# Patient Record
Sex: FEMALE | Race: WHITE | NOT HISPANIC OR LATINO | Employment: OTHER | URBAN - METROPOLITAN AREA
[De-identification: names, ages, dates, MRNs, and addresses within clinical notes are randomized per-mention and may not be internally consistent; named-entity substitution may affect disease eponyms.]

---

## 2017-05-10 ENCOUNTER — ALLSCRIPTS OFFICE VISIT (OUTPATIENT)
Dept: OTHER | Facility: OTHER | Age: 62
End: 2017-05-10

## 2017-05-10 DIAGNOSIS — D50.9 IRON DEFICIENCY ANEMIA: ICD-10-CM

## 2017-05-10 DIAGNOSIS — E78.5 HYPERLIPIDEMIA: ICD-10-CM

## 2017-05-12 ENCOUNTER — APPOINTMENT (OUTPATIENT)
Dept: LAB | Facility: CLINIC | Age: 62
End: 2017-05-12
Payer: COMMERCIAL

## 2017-05-12 ENCOUNTER — TRANSCRIBE ORDERS (OUTPATIENT)
Dept: LAB | Facility: CLINIC | Age: 62
End: 2017-05-12

## 2017-05-12 DIAGNOSIS — E78.5 HYPERLIPIDEMIA: ICD-10-CM

## 2017-05-12 DIAGNOSIS — D50.9 IRON DEFICIENCY ANEMIA: ICD-10-CM

## 2017-05-12 LAB
ALBUMIN SERPL BCP-MCNC: 4.1 G/DL (ref 3.5–5)
ALP SERPL-CCNC: 79 U/L (ref 46–116)
ALT SERPL W P-5'-P-CCNC: 21 U/L (ref 12–78)
ANION GAP SERPL CALCULATED.3IONS-SCNC: 6 MMOL/L (ref 4–13)
AST SERPL W P-5'-P-CCNC: 17 U/L (ref 5–45)
BASOPHILS # BLD AUTO: 0.04 THOUSANDS/ΜL (ref 0–0.1)
BASOPHILS NFR BLD AUTO: 1 % (ref 0–1)
BILIRUB SERPL-MCNC: 0.7 MG/DL (ref 0.2–1)
BUN SERPL-MCNC: 13 MG/DL (ref 5–25)
CALCIUM SERPL-MCNC: 9.1 MG/DL (ref 8.3–10.1)
CHLORIDE SERPL-SCNC: 102 MMOL/L (ref 100–108)
CHOLEST SERPL-MCNC: 313 MG/DL (ref 50–200)
CO2 SERPL-SCNC: 30 MMOL/L (ref 21–32)
CREAT SERPL-MCNC: 0.94 MG/DL (ref 0.6–1.3)
EOSINOPHIL # BLD AUTO: 0.09 THOUSAND/ΜL (ref 0–0.61)
EOSINOPHIL NFR BLD AUTO: 2 % (ref 0–6)
ERYTHROCYTE [DISTWIDTH] IN BLOOD BY AUTOMATED COUNT: 13.2 % (ref 11.6–15.1)
GFR SERPL CREATININE-BSD FRML MDRD: >60 ML/MIN/1.73SQ M
GLUCOSE P FAST SERPL-MCNC: 89 MG/DL (ref 65–99)
HCT VFR BLD AUTO: 43.1 % (ref 34.8–46.1)
HDLC SERPL-MCNC: 72 MG/DL (ref 40–60)
HGB BLD-MCNC: 14.4 G/DL (ref 11.5–15.4)
LDLC SERPL CALC-MCNC: 216 MG/DL (ref 0–100)
LYMPHOCYTES # BLD AUTO: 2.26 THOUSANDS/ΜL (ref 0.6–4.47)
LYMPHOCYTES NFR BLD AUTO: 41 % (ref 14–44)
MCH RBC QN AUTO: 30 PG (ref 26.8–34.3)
MCHC RBC AUTO-ENTMCNC: 33.4 G/DL (ref 31.4–37.4)
MCV RBC AUTO: 90 FL (ref 82–98)
MONOCYTES # BLD AUTO: 0.42 THOUSAND/ΜL (ref 0.17–1.22)
MONOCYTES NFR BLD AUTO: 8 % (ref 4–12)
NEUTROPHILS # BLD AUTO: 2.69 THOUSANDS/ΜL (ref 1.85–7.62)
NEUTS SEG NFR BLD AUTO: 48 % (ref 43–75)
NRBC BLD AUTO-RTO: 0 /100 WBCS
PLATELET # BLD AUTO: 237 THOUSANDS/UL (ref 149–390)
PMV BLD AUTO: 10.4 FL (ref 8.9–12.7)
POTASSIUM SERPL-SCNC: 4.2 MMOL/L (ref 3.5–5.3)
PROT SERPL-MCNC: 7.7 G/DL (ref 6.4–8.2)
RBC # BLD AUTO: 4.8 MILLION/UL (ref 3.81–5.12)
SODIUM SERPL-SCNC: 138 MMOL/L (ref 136–145)
TRIGL SERPL-MCNC: 126 MG/DL
WBC # BLD AUTO: 5.52 THOUSAND/UL (ref 4.31–10.16)

## 2017-05-12 PROCEDURE — 85025 COMPLETE CBC W/AUTO DIFF WBC: CPT

## 2017-05-12 PROCEDURE — 36415 COLL VENOUS BLD VENIPUNCTURE: CPT

## 2017-05-12 PROCEDURE — 80061 LIPID PANEL: CPT

## 2017-05-12 PROCEDURE — 80053 COMPREHEN METABOLIC PANEL: CPT

## 2017-05-15 ENCOUNTER — GENERIC CONVERSION - ENCOUNTER (OUTPATIENT)
Dept: OTHER | Facility: OTHER | Age: 62
End: 2017-05-15

## 2017-06-07 ENCOUNTER — GENERIC CONVERSION - ENCOUNTER (OUTPATIENT)
Dept: OTHER | Facility: OTHER | Age: 62
End: 2017-06-07

## 2017-08-04 ENCOUNTER — ANESTHESIA EVENT (OUTPATIENT)
Dept: GASTROENTEROLOGY | Facility: MEDICAL CENTER | Age: 62
End: 2017-08-04
Payer: COMMERCIAL

## 2017-08-07 ENCOUNTER — GENERIC CONVERSION - ENCOUNTER (OUTPATIENT)
Dept: GASTROENTEROLOGY | Facility: MEDICAL CENTER | Age: 62
End: 2017-08-07

## 2017-08-07 ENCOUNTER — ANESTHESIA (OUTPATIENT)
Dept: GASTROENTEROLOGY | Facility: MEDICAL CENTER | Age: 62
End: 2017-08-07
Payer: COMMERCIAL

## 2017-08-07 ENCOUNTER — HOSPITAL ENCOUNTER (OUTPATIENT)
Facility: MEDICAL CENTER | Age: 62
Setting detail: OUTPATIENT SURGERY
Discharge: HOME | End: 2017-08-07
Attending: INTERNAL MEDICINE | Admitting: INTERNAL MEDICINE
Payer: COMMERCIAL

## 2017-08-07 VITALS
DIASTOLIC BLOOD PRESSURE: 51 MMHG | TEMPERATURE: 97.6 F | BODY MASS INDEX: 21.53 KG/M2 | OXYGEN SATURATION: 97 % | HEIGHT: 62 IN | SYSTOLIC BLOOD PRESSURE: 83 MMHG | HEART RATE: 48 BPM | WEIGHT: 117 LBS | RESPIRATION RATE: 16 BRPM

## 2017-08-07 RX ORDER — SODIUM CHLORIDE 9 MG/ML
125 INJECTION, SOLUTION INTRAVENOUS CONTINUOUS
Status: DISCONTINUED | OUTPATIENT
Start: 2017-08-07 | End: 2017-08-07 | Stop reason: HOSPADM

## 2017-08-07 RX ORDER — PROPOFOL 10 MG/ML
INJECTION, EMULSION INTRAVENOUS AS NEEDED
Status: DISCONTINUED | OUTPATIENT
Start: 2017-08-07 | End: 2017-08-07 | Stop reason: SURG

## 2017-08-07 RX ADMIN — PROPOFOL 50 MG: 10 INJECTION, EMULSION INTRAVENOUS at 11:59

## 2017-08-07 RX ADMIN — PROPOFOL 50 MG: 10 INJECTION, EMULSION INTRAVENOUS at 12:10

## 2017-08-07 RX ADMIN — PROPOFOL 50 MG: 10 INJECTION, EMULSION INTRAVENOUS at 12:05

## 2017-08-07 RX ADMIN — PROPOFOL 50 MG: 10 INJECTION, EMULSION INTRAVENOUS at 12:01

## 2017-08-07 RX ADMIN — SODIUM CHLORIDE 125 ML/HR: 0.9 INJECTION, SOLUTION INTRAVENOUS at 11:30

## 2017-08-07 RX ADMIN — PROPOFOL 150 MG: 10 INJECTION, EMULSION INTRAVENOUS at 11:54

## 2017-11-02 ENCOUNTER — TRANSCRIBE ORDERS (OUTPATIENT)
Dept: ADMINISTRATIVE | Facility: HOSPITAL | Age: 62
End: 2017-11-02

## 2017-11-02 DIAGNOSIS — Z12.39 SCREENING BREAST EXAMINATION: Primary | ICD-10-CM

## 2017-11-20 ENCOUNTER — GENERIC CONVERSION - ENCOUNTER (OUTPATIENT)
Dept: OTHER | Facility: OTHER | Age: 62
End: 2017-11-20

## 2017-12-07 DIAGNOSIS — Z12.31 ENCOUNTER FOR SCREENING MAMMOGRAM FOR MALIGNANT NEOPLASM OF BREAST: ICD-10-CM

## 2017-12-08 ENCOUNTER — ALLSCRIPTS OFFICE VISIT (OUTPATIENT)
Dept: OTHER | Facility: OTHER | Age: 62
End: 2017-12-08

## 2017-12-08 ENCOUNTER — GENERIC CONVERSION - ENCOUNTER (OUTPATIENT)
Dept: GYNECOLOGY | Facility: CLINIC | Age: 62
End: 2017-12-08

## 2017-12-08 ENCOUNTER — HOSPITAL ENCOUNTER (OUTPATIENT)
Dept: MAMMOGRAPHY | Facility: HOSPITAL | Age: 62
Discharge: HOME | End: 2017-12-08
Attending: OBSTETRICS & GYNECOLOGY
Payer: COMMERCIAL

## 2017-12-08 DIAGNOSIS — Z12.39 SCREENING BREAST EXAMINATION: ICD-10-CM

## 2017-12-08 PROCEDURE — G0202 SCR MAMMO BI INCL CAD: HCPCS

## 2017-12-09 NOTE — PROGRESS NOTES
Assessment    1  Encounter for routine gynecological examination (V72 31) (Z01 419)   2  Post-menopause atrophic vaginitis (627 3) (N95 2)    Plan  Encounter for screening mammogram for malignant neoplasm of breast    · * MAMMO SCREENING BILATERAL W CAD; Status:Hold For - Scheduling,RetrospectiveAuthorization; Requested for:[id number];    Perform:[facility]; [phone]; Last Updated By:Aminata Joseph; 12/8/2017 8:53:09 AM;Ordered;For:Encounter for screening mammogram for malignant neoplasm of breast; Ordered By:Victoriano Wheatley;   · MAMMO SCREENING BILATERAL W 3D & CAD; Status:Canceled - Scheduling;    Perform:[facility]; EHB:[id number]; Last Updated By:mAinata Joseph; 12/8/2017 8:52:37 AM;Ordered;for screening mammogram for malignant neoplasm of breast; Ordered By:Victoriano Wheatley;    Discussion/Summary  Pap test was done today Breast cancer screening: mammogram has been ordered  Colorectal cancer screening: colorectal cancer screening is current, colonoscopy is needed every five years and the next colonoscopy is due 2022  Osteoporosis screening: bone mineral density testing is current  Refill estrace cream       Chief Complaint  Patient presents for yearly exam       History of Present Illness  GYN HM, Adult Female [facility]: The patient is being seen for a gynecology evaluation  The last health maintenance visit was 1 year(s) ago  General Health:  Reproductive health: the patient is postmenopausal    Screening:      Review of Systems   Constitutional: No fever, no chills, feels well, no tiredness, no recent weight gain or loss  Breasts: no complaints of breast pain, breast lump or nipple discharge  Gastrointestinal: no complaints of abdominal pain, no constipation, no nausea or diarrhea, no vomiting, no bloody stools  Genitourinary: no complaints of dysuria, no incontinence, no pelvic pain, no dysmenorrhea, no vaginal discharge or abnormal vaginal bleeding  Active Problems    1  Allergic contact dermatitis due to other agents (692 89) (L23 89)   2  Cervical spinal stenosis (723 0) (M48 02)   3  Encounter for mammogram to establish baseline mammogram (V76 12) (Z12 31)   4  Encounter for routine gynecological examination (V72 31) (Z01 419)   5  Encounter for routine gynecological examination with Papanicolaou smear of cervix (V72 31,V76 2) (Z01 419)   6  Encounter for screening mammogram for malignant neoplasm of breast (V76 12) (Z12 31)   7  Facial cellulitis (682 0) (L03 211)   8  Female Stress Incontinence (625 6)   9  Flu vaccine need (V04 81) (Z23)   10  Hyperlipidemia (272 4) (E78 5)   11  Iron deficiency anemia (280 9) (D50 9)   12  Low back pain (724 2) (M54 5)   13  Menopausal symptoms (627 2) (N95 1)   14  Non-neoplastic nevus (448 1) (I78 1)   15  Peripheral neuropathy (356 9) (G62 9)   16  Post-menopause atrophic vaginitis (627 3) (N95 2)   17  Ptosis of eyelid, unspecified laterality (374 30) (H02 409)   18  Sciatica of right side (724 3) (M54 31)   19  Special screening for malignant neoplasms, colon (V76 51) (Z12 11)   20  History of Stroke, 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)   21   Symptomatic menopausal or female climacteric states (627 2) (N95 1)    Past Medical History     · History of Depression (311) (F32 9)   · History of Depression (311) (F32 9)   · History Of 2  Previous Pregnancies (V61 5)   · History of eczema (V13 3) (Z87 2)   · History of Meralgia paresthetica of right side (355 1) (G57 11)   · Personal history of urinary tract infection (V13 02) (Z87 440)   · History of Previous Pregnancies Resulted In 2  Living Children   · History of Stroke Syndrome (436)   · History of Stroke, 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)    Surgical History   · History of  Section   · History of Complete Colonoscopy   · History of Hysteroscopy With Endometrial Ablation   · History of Tubal Ligation    Family History  Mother    · Family history of Hyperlipidemia  Family History    · Family history of Hypertension (V17 49)   · Family history of Stroke Syndrome (V17 1)    Social History     · Being A Social Drinker   · Marital History -  (V61 03)   · Never smoker    Current Meds   1  Aspirin  MG Oral Tablet Delayed Release; Take 1 tablet daily; Therapy: 59RDX9583 to Recorded   2  Estrace 0 1 MG/GM Vaginal Cream; Use 1gm M,W, F for 3wks  then 0 5-1gm weekly thereafter; Therapy: 13GMW8522 to (Last Rx:08Jun2016)  Requested for: 18ORE0618 Ordered    Allergies  1  Paxil TABS    Vitals   Recorded: 15WAO7641 71:58DR   Systolic 926, RUE, Sitting   Diastolic 78, RUE, Sitting   Height 5 ft 2 6 in   Weight 115 lb 8 oz   BMI Calculated 20 72   BSA Calculated 1 52       Physical Exam   Constitutional  General appearance: No acute distress, well appearing and well nourished  Neck  Neck: Normal, supple, trachea midline, no masses  Thyroid: Normal, no thyromegaly  Pulmonary  Respiratory effort: No increased work of breathing or signs of respiratory distress  Auscultation of lungs: Clear to auscultation  Cardiovascular  Auscultation of heart: Normal rate and rhythm, normal S1 and S2, no murmurs  Peripheral vascular exam: Normal pulses Throughout  Genitourinary  External genitalia: Normal and no lesions appreciated  Vagina: Normal, no lesions or dryness appreciated  Urethra: Normal    Urethral meatus: Normal    Bladder: Normal, soft, non-tender and no prolapse or masses appreciated  Cervix: Normal, no palpable masses  Uterus: Normal, non-tender, not enlarged, and no palpable masses  Adnexa/parametria: Normal, non-tender and no fullness or masses appreciated  Chest  Breasts: Normal and no dimpling or skin changes noted  Abdomen  Abdomen: Normal, non-tender, and no organomegaly noted  Liver and spleen: No hepatomegaly or splenomegaly  Examination for hernias: No hernias appreciated     Lymphatic  Palpation of lymph nodes in neck, axillae, groin and/or other locations: No lymphadenopathy or masses noted  Psychiatric  Orientation to person, place, and time: Normal    Mood and affect: Normal        Provider Comments  Provider Comments:   D 32 will be published next yearS Tiffany García 15       Future Appointments    Date/Time Provider Specialty Site   02/12/2018 03:00 PM PRITESH Kong   Family Medicine [address] [facility]       Signatures   Electronically signed by : Shivani Thompson DO; Dec  8 2017  9:08AM EST                       (Author)

## 2017-12-12 ENCOUNTER — LAB CONVERSION - ENCOUNTER (OUTPATIENT)
Dept: OTHER | Facility: OTHER | Age: 62
End: 2017-12-12

## 2017-12-12 LAB
ADDITIONAL INFORMATION (HISTORICAL): NORMAL
ADEQUACY: (HISTORICAL): NORMAL
COMMENT (HISTORICAL): NORMAL
CYTOTECHNOLOGIST: (HISTORICAL): NORMAL
INTERPRETATION (HISTORICAL): NORMAL
LMP (HISTORICAL): NORMAL
PREV. BX: (HISTORICAL): NORMAL
PREV. PAP (HISTORICAL): NORMAL
SOURCE (HISTORICAL): NORMAL

## 2018-01-12 NOTE — RESULT NOTES
Verified Results  (1) LIPID PANEL, FASTING [id number] 12:27PM Terrence Toussaint Order Number: [id number]     Test Name Result Flag Reference   CHOLESTEROL 313 mg/dL H    HDL,DIRECT 72 mg/dL H 40-60   Specimen collection should occur prior to Metamizole administration due to the potential for falsely depressed results  LDL CHOLESTEROL CALCULATED 216 mg/dL H 0-100   Triglyceride:         Normal              <150 mg/dl       Borderline High    150-199 mg/dl       High               200-499 mg/dl       Very High          >499 mg/dl  Cholesterol:         Desirable        <200 mg/dl      Borderline High  200-239 mg/dl      High             >239 mg/dl  HDL Cholesterol:        High    >59 mg/dL      Low     <41 mg/dL  LDL CALCULATED:    This screening LDL is a calculated result  It does not have the accuracy of the Direct Measured LDL in the monitoring of patients with hyperlipidemia and/or statin therapy  Direct Measure LDL (WML889) must be ordered separately in these patients  TRIGLYCERIDES 126 mg/dL  <=150   Specimen collection should occur prior to N-Acetylcysteine or Metamizole administration due to the potential for falsely depressed results       (1) COMPREHENSIVE METABOLIC PANEL [id number] 21:68TO Terrence Toussaint Order Number: [id number]     Test Name Result Flag Reference   SODIUM 138 mmol/L  136-145   POTASSIUM 4 2 mmol/L  3 5-5 3   CHLORIDE 102 mmol/L  100-108   CARBON DIOXIDE 30 mmol/L  21-32   ANION GAP (CALC) 6 mmol/L  4-13   BLOOD UREA NITROGEN 13 mg/dL  5-25   CREATININE 0 94 mg/dL  0 60-1 30   Standardized to IDMS reference method   CALCIUM 9 1 mg/dL  8 3-10 1   BILI, TOTAL 0 70 mg/dL  0 20-1 00   ALK PHOSPHATAS 79 U/L     ALT (SGPT) 21 U/L  12-78   AST(SGOT) 17 U/L  5-45   ALBUMIN 4 1 g/dL  3 5-5 0   TOTAL PROTEIN 7 7 g/dL  6 4-8 2   eGFR Non-African American      >60 0 ml/min/1 73sq m   [facility] Disease Education Program recommendations are as follows:  GFR calculation is accurate only with a steady state creatinine  Chronic Kidney disease less than 60 ml/min/1 73 sq  meters  Kidney failure less than 15 ml/min/1 73 sq  meters  GLUCOSE FASTING 89 mg/dL  65-99     (1) CBC/PLT/DIFF 14PLJ5719 12:27PM Lauro Ford Order Number: [id number]     Test Name Result Flag Reference   WBC COUNT 5 52 Thousand/uL  4 31-10 16   RBC COUNT 4 80 Million/uL  3 81-5 12   HEMOGLOBIN 14 4 g/dL  11 5-15 4   HEMATOCRIT 43 1 %  34 8-46  1   MCV 90 fL  82-98   MCH 30 0 pg  26 8-34 3   MCHC 33 4 g/dL  31 4-37 4   RDW 13 2 %  11 6-15 1   MPV 10 4 fL  8 9-12 7   PLATELET COUNT 089 Thousands/uL  149-390   nRBC AUTOMATED 0 /100 WBCs     NEUTROPHILS RELATIVE PERCENT 48 %  43-75   LYMPHOCYTES RELATIVE PERCENT 41 %  14-44   MONOCYTES RELATIVE PERCENT 8 %  4-12   EOSINOPHILS RELATIVE PERCENT 2 %  0-6   BASOPHILS RELATIVE PERCENT 1 %  0-1   NEUTROPHILS ABSOLUTE COUNT 2 69 Thousands/? ??L  1 85-7 62   LYMPHOCYTES ABSOLUTE COUNT 2 26 Thousands/? ??L  0 60-4 47   MONOCYTES ABSOLUTE COUNT 0 42 Thousand/? ??L  0 17-1 22   EOSINOPHILS ABSOLUTE COUNT 0 09 Thousand/? ??L  0 00-0 61   BASOPHILS ABSOLUTE COUNT 0 04 Thousands/? ??L  0 00-0 10

## 2018-01-12 NOTE — MISCELLANEOUS
Message   Recorded as Task   Date: 05/10/2017 01:41 PM, Created By: System   Task Name: Schedule Appointment   Assigned To: Hank Triage,Team   Regarding Patient: Cb Ochoa, Status: Active   Comment:    System - 10 May 2017 1:41 PM     Preferred Communication: Mail  Ordering Site: [facility]    Referred To: Bridgett Gatica MD, Vivek Laughlin (Gastroenterology)  To Be Done: 10 May 2017   [facility] - 10 May 2017 1:48 PM     TASK IN PROGRESS   [facility] - 10 May 2017 3:54 PM     TASK EDITED  5/10/17 Patients chart states she has a history of iron deficancy anemia patient states she does not have anemia and is getting bloodwork done to test that explained to patient if she is still anemic she needs a consult prior to procedure patient wants to get bloodwork and results before scheduling she does not want to come in for a consult she will call back after she gets her results   Yuko Lloyd - 17 May 2017 10:57 AM     TASK EDITED  LVM @ [phone]   Yuko Lloyd - 19 May 2017 9:36 AM     TASK EDITED  pt still has not received blood work results, pt will call back when she does   [facility] - 06 Jun 2017 11:50 AM     TASK EDITED  Pt did not call back to schedule  Please FU w/ pt and have pt call office directly to schedule   Radha Quispe - 06 Jun 2017 4:23 PM     TASK REASSIGNED: Previously Assigned To [address] [place] [address] - 06 Jun 2017 5:12 PM     TASK EDITED  L/m to call office  Delia Wong - 07 Jun 2017 8:17 AM     TASK EDITED  Pt notified and mailed copy of labs from 5/12, she will call Gi to make appt for colonoscopy  Active Problems    1  Cervical spinal stenosis (723 0) (M48 02)   2  Encounter for mammogram to establish baseline mammogram (V76 12) (Z12 31)   3  Encounter for routine gynecological examination (V72 31) (Z01 419)   4  Encounter for routine gynecological examination with Papanicolaou smear of cervix   (V72 31,V76 2) (Z01 419)   5  Encounter for screening mammogram for malignant neoplasm of breast (V76 12)   (Z12 31)   6  Female Stress Incontinence (625 6)   7  Flu vaccine need (V04 81) (Z23)   8  Hyperlipidemia (272 4) (E78 5)   9  Iron deficiency anemia (280 9) (D50 9)   10  Low back pain (724 2) (M54 5)   11  Menopausal symptoms (627 2) (N95 1)   12  Non-neoplastic nevus (448 1) (I78 1)   13  Peripheral neuropathy (356 9) (G62 9)   14  Post-menopause atrophic vaginitis (627 3) (N95 2)   15  Ptosis of eyelid, unspecified laterality (374 30) (H02 409)   16  Sciatica of right side (724 3) (M54 31)   17  Special screening for malignant neoplasms, colon (V76 51) (Z12 11)   18  History of Stroke, embolic (584 82) (V70 7)   19  Symptomatic menopausal or female climacteric states (627 2) (N95 1)    Current Meds   1  Aspirin  MG Oral Tablet Delayed Release; Take 1 tablet daily; Therapy: 68REJ5069 to Recorded   2  Estrace 0 1 MG/GM Vaginal Cream; Use 1gm M,W, F for 3wks  then 0 5-1gm weekly   thereafter; Therapy: 25NBN8966 to (Last Rx:08Jun2016)  Requested for: 42ZMZ5317 Ordered   3  Meloxicam 15 MG Oral Tablet; TAKE 1 TABLET Daily PRN pain; Therapy: 69HJG5290 to (Evaluate:49Yhy2642)  Requested for: 29AAB2164; Last   Rx:43Mkl0400 Ordered   4  Triamcinolone Acetonide 0 1 % Mouth/Throat Paste; USE AS DIRECTED; Therapy: 20SUU0316 to (Evaluate:78Yta9695)  Requested for: 95QRD6633; Last   Rx:08Sxb0548 Ordered    Allergies    1   Paxil TABS    Signatures   Electronically signed by : Oswald Dominguez, ; Jun 7 2017  8:17AM EST                       (Author)

## 2018-01-14 VITALS
BODY MASS INDEX: 21.79 KG/M2 | HEART RATE: 48 BPM | DIASTOLIC BLOOD PRESSURE: 80 MMHG | HEIGHT: 63 IN | RESPIRATION RATE: 16 BRPM | WEIGHT: 123 LBS | SYSTOLIC BLOOD PRESSURE: 122 MMHG

## 2018-01-22 VITALS
SYSTOLIC BLOOD PRESSURE: 120 MMHG | BODY MASS INDEX: 20.46 KG/M2 | DIASTOLIC BLOOD PRESSURE: 78 MMHG | HEIGHT: 63 IN | WEIGHT: 115.5 LBS

## 2018-01-22 VITALS
WEIGHT: 117.25 LBS | HEIGHT: 63 IN | HEART RATE: 56 BPM | TEMPERATURE: 98.1 F | DIASTOLIC BLOOD PRESSURE: 72 MMHG | SYSTOLIC BLOOD PRESSURE: 116 MMHG | RESPIRATION RATE: 14 BRPM | BODY MASS INDEX: 20.77 KG/M2

## 2018-02-12 ENCOUNTER — OFFICE VISIT (OUTPATIENT)
Dept: FAMILY MEDICINE CLINIC | Facility: CLINIC | Age: 63
End: 2018-02-12
Payer: COMMERCIAL

## 2018-02-12 VITALS
HEART RATE: 60 BPM | BODY MASS INDEX: 21.35 KG/M2 | OXYGEN SATURATION: 97 % | WEIGHT: 116 LBS | RESPIRATION RATE: 16 BRPM | SYSTOLIC BLOOD PRESSURE: 100 MMHG | HEIGHT: 62 IN | DIASTOLIC BLOOD PRESSURE: 64 MMHG

## 2018-02-12 DIAGNOSIS — Z11.59 NEED FOR HEPATITIS C SCREENING TEST: ICD-10-CM

## 2018-02-12 DIAGNOSIS — I63.9 CEREBROVASCULAR ACCIDENT (CVA), UNSPECIFIED MECHANISM (HCC): ICD-10-CM

## 2018-02-12 DIAGNOSIS — G58.9 MONONEUROPATHY: ICD-10-CM

## 2018-02-12 DIAGNOSIS — E78.01 FAMILIAL HYPERCHOLESTEROLEMIA: ICD-10-CM

## 2018-02-12 DIAGNOSIS — D50.9 IRON DEFICIENCY ANEMIA, UNSPECIFIED IRON DEFICIENCY ANEMIA TYPE: ICD-10-CM

## 2018-02-12 DIAGNOSIS — Z00.00 WELL ADULT EXAM: Primary | ICD-10-CM

## 2018-02-12 DIAGNOSIS — M48.02 CERVICAL SPINAL STENOSIS: ICD-10-CM

## 2018-02-12 PROBLEM — L23.89 ALLERGIC CONTACT DERMATITIS DUE TO OTHER AGENTS: Status: ACTIVE | Noted: 2017-11-20

## 2018-02-12 PROCEDURE — 99396 PREV VISIT EST AGE 40-64: CPT | Performed by: FAMILY MEDICINE

## 2018-02-12 RX ORDER — TRIAMCINOLONE ACETONIDE 0.1 %
PASTE (GRAM) DENTAL
COMMUNITY
Start: 2013-11-07 | End: 2018-02-12 | Stop reason: ALTCHOICE

## 2018-02-12 RX ORDER — PEG-3350, SODIUM SULFATE, SODIUM CHLORIDE, POTASSIUM CHLORIDE, SODIUM ASCORBATE AND ASCORBIC ACID 7.5-2.691G
KIT ORAL
COMMUNITY
Start: 2017-06-08 | End: 2018-02-12 | Stop reason: ALTCHOICE

## 2018-02-12 RX ORDER — NEOMYCIN SULFATE, POLYMYXIN B SULFATE, AND DEXAMETHASONE 3.5; 10000; 1 MG/G; [USP'U]/G; MG/G
OINTMENT OPHTHALMIC
COMMUNITY
Start: 2018-01-22 | End: 2018-02-12 | Stop reason: ALTCHOICE

## 2018-02-12 RX ORDER — MELOXICAM 15 MG/1
1 TABLET ORAL DAILY PRN
COMMUNITY
Start: 2017-05-10 | End: 2018-02-12 | Stop reason: ALTCHOICE

## 2018-02-12 NOTE — PATIENT INSTRUCTIONS
Peripheral neuropathy  Patient appears to have a monitor neuropathy of her left lateral leg  She is not aware of any crushing injury, but at some point the nerve was probably compressed  She does not feel it would be necessary to do an EMG at this point and we will continue to monitor this clinically  I did check a B12  Check a coronary calcium score for screening purposes in light of her very high cholesterol on family history of coronary disease  Continue on aspirin for history of stroke in 2011  Other risk factors are extremely well controlled except for her high cholesterol  She declined statin therapy at this time, but she may want to consider red yeast rice in addition to decreasing her intake of saturated fats  Repeat some lipids at some point within the next 6 months or so

## 2018-02-12 NOTE — ASSESSMENT & PLAN NOTE
Patient appears to have a  neuropathy of her left lateral leg  She is not aware of any crushing injury, but at some point the nerve was probably compressed  She does not feel it would be necessary to do an EMG at this point and we will continue to monitor this clinically  I did check a B12

## 2018-02-26 ENCOUNTER — HOSPITAL ENCOUNTER (OUTPATIENT)
Dept: CT IMAGING | Facility: HOSPITAL | Age: 63
Discharge: HOME | End: 2018-02-26
Payer: COMMERCIAL

## 2018-02-26 DIAGNOSIS — E78.01 FAMILIAL HYPERCHOLESTEROLEMIA: ICD-10-CM

## 2018-06-04 ENCOUNTER — OFFICE VISIT (OUTPATIENT)
Dept: FAMILY MEDICINE CLINIC | Facility: CLINIC | Age: 63
End: 2018-06-04
Payer: COMMERCIAL

## 2018-06-04 VITALS
DIASTOLIC BLOOD PRESSURE: 60 MMHG | SYSTOLIC BLOOD PRESSURE: 104 MMHG | HEART RATE: 99 BPM | HEIGHT: 62 IN | OXYGEN SATURATION: 52 % | RESPIRATION RATE: 16 BRPM | WEIGHT: 114 LBS | BODY MASS INDEX: 20.98 KG/M2

## 2018-06-04 DIAGNOSIS — Z01.818 PRE-OPERATIVE EXAMINATION: ICD-10-CM

## 2018-06-04 DIAGNOSIS — H02.403 PTOSIS OF BOTH EYELIDS: Primary | ICD-10-CM

## 2018-06-04 DIAGNOSIS — Z11.59 NEED FOR HEPATITIS C SCREENING TEST: ICD-10-CM

## 2018-06-04 DIAGNOSIS — E78.00 PURE HYPERCHOLESTEROLEMIA: ICD-10-CM

## 2018-06-04 DIAGNOSIS — D50.9 IRON DEFICIENCY ANEMIA, UNSPECIFIED IRON DEFICIENCY ANEMIA TYPE: ICD-10-CM

## 2018-06-04 DIAGNOSIS — M53.3 SI (SACROILIAC) JOINT DYSFUNCTION: ICD-10-CM

## 2018-06-04 PROCEDURE — 3008F BODY MASS INDEX DOCD: CPT | Performed by: FAMILY MEDICINE

## 2018-06-04 PROCEDURE — 99214 OFFICE O/P EST MOD 30 MIN: CPT | Performed by: FAMILY MEDICINE

## 2018-06-04 NOTE — ASSESSMENT & PLAN NOTE
She is having some pain along her proximal right SI joint which came on rather abruptly  She would like to hold off on physical therapy for the time being but would certainly benefit from that if this continues to bother her
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
## 2018-06-04 NOTE — ASSESSMENT & PLAN NOTE
She is stable and remains on high dose aspirin  She does have significant hyperlipidemia and I would suggest that she consider statin therapy at some point  She would like to hold off on that for the time being  I did ask her to get some fasting labs in the near future

## 2018-11-23 ENCOUNTER — OFFICE VISIT (OUTPATIENT)
Dept: FAMILY MEDICINE CLINIC | Facility: CLINIC | Age: 63
End: 2018-11-23
Payer: COMMERCIAL

## 2018-11-23 ENCOUNTER — APPOINTMENT (OUTPATIENT)
Dept: RADIOLOGY | Facility: MEDICAL CENTER | Age: 63
End: 2018-11-23
Payer: COMMERCIAL

## 2018-11-23 ENCOUNTER — APPOINTMENT (OUTPATIENT)
Dept: LAB | Facility: MEDICAL CENTER | Age: 63
End: 2018-11-23
Payer: COMMERCIAL

## 2018-11-23 VITALS
WEIGHT: 120 LBS | OXYGEN SATURATION: 97 % | DIASTOLIC BLOOD PRESSURE: 82 MMHG | BODY MASS INDEX: 22.08 KG/M2 | RESPIRATION RATE: 18 BRPM | HEIGHT: 62 IN | HEART RATE: 52 BPM | SYSTOLIC BLOOD PRESSURE: 128 MMHG

## 2018-11-23 DIAGNOSIS — M54.41 CHRONIC BILATERAL LOW BACK PAIN WITH BILATERAL SCIATICA: ICD-10-CM

## 2018-11-23 DIAGNOSIS — D50.9 IRON DEFICIENCY ANEMIA, UNSPECIFIED IRON DEFICIENCY ANEMIA TYPE: ICD-10-CM

## 2018-11-23 DIAGNOSIS — M53.3 SI (SACROILIAC) JOINT DYSFUNCTION: ICD-10-CM

## 2018-11-23 DIAGNOSIS — G89.29 CHRONIC BILATERAL LOW BACK PAIN WITH BILATERAL SCIATICA: ICD-10-CM

## 2018-11-23 DIAGNOSIS — M54.42 CHRONIC BILATERAL LOW BACK PAIN WITH BILATERAL SCIATICA: ICD-10-CM

## 2018-11-23 DIAGNOSIS — Z23 FLU VACCINE NEED: ICD-10-CM

## 2018-11-23 DIAGNOSIS — E78.00 PURE HYPERCHOLESTEROLEMIA: ICD-10-CM

## 2018-11-23 DIAGNOSIS — I63.9 CEREBROVASCULAR ACCIDENT (CVA), UNSPECIFIED MECHANISM (HCC): ICD-10-CM

## 2018-11-23 DIAGNOSIS — M53.3 SI (SACROILIAC) JOINT DYSFUNCTION: Primary | ICD-10-CM

## 2018-11-23 PROBLEM — M54.50 CHRONIC BILATERAL LOW BACK PAIN: Status: ACTIVE | Noted: 2018-11-23

## 2018-11-23 PROBLEM — L23.89 ALLERGIC CONTACT DERMATITIS DUE TO OTHER AGENTS: Status: RESOLVED | Noted: 2017-11-20 | Resolved: 2018-11-23

## 2018-11-23 LAB
ALBUMIN SERPL BCP-MCNC: 4 G/DL (ref 3.5–5)
ALP SERPL-CCNC: 76 U/L (ref 46–116)
ALT SERPL W P-5'-P-CCNC: 24 U/L (ref 12–78)
ANION GAP SERPL CALCULATED.3IONS-SCNC: 2 MMOL/L (ref 4–13)
AST SERPL W P-5'-P-CCNC: 17 U/L (ref 5–45)
BILIRUB SERPL-MCNC: 0.52 MG/DL (ref 0.2–1)
BUN SERPL-MCNC: 20 MG/DL (ref 5–25)
CALCIUM SERPL-MCNC: 9.1 MG/DL (ref 8.3–10.1)
CHLORIDE SERPL-SCNC: 104 MMOL/L (ref 100–108)
CHOLEST SERPL-MCNC: 286 MG/DL (ref 50–200)
CO2 SERPL-SCNC: 29 MMOL/L (ref 21–32)
CREAT SERPL-MCNC: 0.97 MG/DL (ref 0.6–1.3)
GFR SERPL CREATININE-BSD FRML MDRD: 63 ML/MIN/1.73SQ M
GLUCOSE P FAST SERPL-MCNC: 93 MG/DL (ref 65–99)
HDLC SERPL-MCNC: 77 MG/DL (ref 40–60)
LDLC SERPL CALC-MCNC: 187 MG/DL (ref 0–100)
POTASSIUM SERPL-SCNC: 3.6 MMOL/L (ref 3.5–5.3)
PROT SERPL-MCNC: 7.6 G/DL (ref 6.4–8.2)
SODIUM SERPL-SCNC: 135 MMOL/L (ref 136–145)
TRIGL SERPL-MCNC: 111 MG/DL

## 2018-11-23 PROCEDURE — 90682 RIV4 VACC RECOMBINANT DNA IM: CPT

## 2018-11-23 PROCEDURE — 90471 IMMUNIZATION ADMIN: CPT

## 2018-11-23 PROCEDURE — 72110 X-RAY EXAM L-2 SPINE 4/>VWS: CPT

## 2018-11-23 PROCEDURE — 99213 OFFICE O/P EST LOW 20 MIN: CPT | Performed by: FAMILY MEDICINE

## 2018-11-23 PROCEDURE — 80053 COMPREHEN METABOLIC PANEL: CPT

## 2018-11-23 PROCEDURE — 80061 LIPID PANEL: CPT

## 2018-11-23 PROCEDURE — 36415 COLL VENOUS BLD VENIPUNCTURE: CPT

## 2018-11-23 PROCEDURE — 3008F BODY MASS INDEX DOCD: CPT | Performed by: FAMILY MEDICINE

## 2018-11-23 RX ORDER — MELOXICAM 15 MG/1
15 TABLET ORAL DAILY
Qty: 30 TABLET | Refills: 0 | Status: SHIPPED | OUTPATIENT
Start: 2018-11-23 | End: 2018-12-20 | Stop reason: SDUPTHER

## 2018-11-23 NOTE — PROGRESS NOTES
Assessment/Plan:       Problem List Items Addressed This Visit        Cardiovascular and Mediastinum    Stroke [facility])     Continue with aspirin  She declines statin therapy  I had a long talk with her about the potential benefits for statin in particular in light of her history of stroke  She will get back to me if she changes her mind  She will be getting some lab work done today  Musculoskeletal and Integument    SI (sacroiliac) joint dysfunction - Primary     Trial of physical therapy  Check an x-ray in light of the progressive radicular symptoms  Relevant Medications    meloxicam (MOBIC) 15 mg tablet    Other Relevant Orders    Ambulatory referral to Physical Therapy    XR spine lumbar 2 or 3 views injury       Other    Pure hypercholesterolemia     She is a very high LDL and I would like her to consider statin therapy, in particular in light of her very high LDL  Relevant Orders    Comprehensive metabolic panel    Lipid Panel with Direct LDL reflex    Iron deficiency anemia     Resolved on recent blood work         Chronic bilateral low back pain    Relevant Medications    meloxicam (MOBIC) 15 mg tablet    Other Relevant Orders    Ambulatory referral to Physical Therapy    XR spine lumbar 2 or 3 views injury      Other Visit Diagnoses     Flu vaccine need        Relevant Orders    influenza vaccine, [id number], quadrivalent, recombinant, PF, 0 5 mL, for patients 18 yr+ (FLUBLOK) (Completed)            Subjective:      Patient ID: Elba Nathan is a 66 y o  female  HPI patient presents today for follow-up for chronic health issues  Overall, she is in good health  She has a prior history of stroke and remains on aspirin  She does have hyperlipidemia but has declined statin therapy  She continues to have issues with back pain  She has pain across her lumbar back which radiates into her upper buttocks as well as down her posterior legs bilaterally    It has not affected her bowel or bladder control  She does get some cramping of her feet bilaterally on occasion but denies any weakness  The following portions of the patient's history were reviewed and updated as appropriate: allergies, current medications, past family history, past medical history, past social history, past surgical history and problem list     Review of Systems   Constitutional: Negative for appetite change, chills, fatigue, fever and unexpected weight change  HENT: Negative for trouble swallowing  Eyes: Negative for visual disturbance  Respiratory: Negative for cough, chest tightness, shortness of breath and wheezing  Cardiovascular: Negative for chest pain  Gastrointestinal: Negative for abdominal distention, abdominal pain, blood in stool, constipation and diarrhea  Endocrine: Negative for polyuria  Genitourinary: Negative for difficulty urinating and flank pain  Musculoskeletal: Negative for arthralgias and myalgias  Skin: Negative for rash  Neurological: Negative for dizziness and light-headedness  Hematological: Negative for adenopathy  Does not bruise/bleed easily  Psychiatric/Behavioral: Negative for sleep disturbance  Objective:      /82   Pulse (!) 52   Resp 18   Ht 5' 2" (1 575 m)   Wt 54 4 kg (120 lb)   SpO2 97%   BMI 21 95 kg/m²          Physical Exam   Constitutional: She is oriented to person, place, and time  She appears well-developed and well-nourished  No distress  HENT:   Head: Normocephalic  Eyes: Pupils are equal, round, and reactive to light  Neck: No tracheal deviation present  No thyromegaly present  Cardiovascular: Normal rate, regular rhythm and normal heart sounds  No murmur heard  Pulmonary/Chest: Effort normal  No respiratory distress  She has no wheezes  She has no rales  Abdominal: Soft  She exhibits no distension  There is no tenderness  Musculoskeletal: Normal range of motion   She exhibits tenderness (She has some tenderness over the lower lumbar spine and along her bilateral SI joints  She has good flexion and extension  She has a lot of tightness with straight leg raise but no radicular symptoms currently  )  She exhibits no edema or deformity  Neurological: She is alert and oriented to person, place, and time  No cranial nerve deficit  Skin: Skin is warm  She is not diaphoretic  Psychiatric: She has a normal mood and affect   Judgment and thought content normal          Sho Hurst MD

## 2018-11-23 NOTE — ASSESSMENT & PLAN NOTE
Continue with aspirin  She declines statin therapy  I had a long talk with her about the potential benefits for statin in particular in light of her history of stroke  She will get back to me if she changes her mind  She will be getting some lab work done today

## 2018-11-23 NOTE — ASSESSMENT & PLAN NOTE
She is a very high LDL and I would like her to consider statin therapy, in particular in light of her very high LDL

## 2018-12-12 ENCOUNTER — ANNUAL EXAM (OUTPATIENT)
Dept: GYNECOLOGY | Facility: CLINIC | Age: 63
End: 2018-12-12
Payer: COMMERCIAL

## 2018-12-12 VITALS
HEIGHT: 63 IN | BODY MASS INDEX: 21.05 KG/M2 | WEIGHT: 118.8 LBS | DIASTOLIC BLOOD PRESSURE: 70 MMHG | SYSTOLIC BLOOD PRESSURE: 108 MMHG

## 2018-12-12 DIAGNOSIS — N95.2 ATROPHIC VAGINITIS: Primary | ICD-10-CM

## 2018-12-12 DIAGNOSIS — Z01.419 ENCOUNTER FOR GYNECOLOGICAL EXAMINATION WITHOUT ABNORMAL FINDING: ICD-10-CM

## 2018-12-12 DIAGNOSIS — Z12.4 ENCOUNTER FOR PAPANICOLAOU SMEAR FOR CERVICAL CANCER SCREENING: ICD-10-CM

## 2018-12-12 DIAGNOSIS — Z13.820 SCREENING FOR OSTEOPOROSIS: ICD-10-CM

## 2018-12-12 PROCEDURE — 76977 US BONE DENSITY MEASURE: CPT | Performed by: OBSTETRICS & GYNECOLOGY

## 2018-12-12 PROCEDURE — S0612 ANNUAL GYNECOLOGICAL EXAMINA: HCPCS | Performed by: OBSTETRICS & GYNECOLOGY

## 2018-12-12 RX ORDER — ESTRADIOL 0.1 MG/G
1 CREAM VAGINAL WEEKLY
Qty: 42.5 G | Refills: 3 | Status: SHIPPED | OUTPATIENT
Start: 2018-12-12 | End: 2019-11-20 | Stop reason: SDUPTHER

## 2018-12-12 NOTE — PROGRESS NOTES
Assessment/Plan:         Diagnoses and all orders for this visit:    Atrophic vaginitis  -     estradiol (ESTRACE VAGINAL) 0 1 mg/g vaginal cream; Insert 1 g into the vagina once a week    Encounter for gynecological examination without abnormal finding    Screening for osteoporosis: heel scan -0 5        Subjective:      Patient ID: Yanira Muniz is a 66 y o  female  HPI   Annual exam  No c/o  Mild tolerable CORTEZ  Colonoscopy 8/17 nml ( 5 yrs)    The following portions of the patient's history were reviewed and updated as appropriate: allergies, current medications, past family history, past medical history, past social history, past surgical history and problem list     Review of Systems   Constitutional: Negative  Gastrointestinal: Negative  Genitourinary: Negative  Objective:      /70   Ht 5' 3" (1 6 m)   Wt 53 9 kg (118 lb 12 8 oz)   BMI 21 04 kg/m²          Physical Exam   Constitutional: She appears well-developed and well-nourished  Neck: Normal range of motion  Neck supple  No thyromegaly present  Cardiovascular: Normal rate, regular rhythm and normal heart sounds  Pulmonary/Chest: Effort normal and breath sounds normal  No respiratory distress  Right breast exhibits no inverted nipple, no mass, no nipple discharge, no skin change and no tenderness  Left breast exhibits no inverted nipple, no mass, no nipple discharge, no skin change and no tenderness  Breasts are symmetrical    Abdominal: Soft  Bowel sounds are normal  She exhibits no distension and no mass  There is no tenderness  There is no rebound and no guarding  Hernia confirmed negative in the right inguinal area and confirmed negative in the left inguinal area  Genitourinary: There is no rash or lesion on the right labia  There is no rash or lesion on the left labia  Uterus is not deviated, not enlarged, not fixed and not tender  Cervix exhibits no motion tenderness, no discharge and no friability   Right adnexum displays no mass, no tenderness and no fullness  Left adnexum displays no mass, no tenderness and no fullness  No bleeding in the vagina  No vaginal discharge found  Lymphadenopathy:        Right: No inguinal adenopathy present  Left: No inguinal adenopathy present

## 2018-12-20 DIAGNOSIS — M53.3 SI (SACROILIAC) JOINT DYSFUNCTION: ICD-10-CM

## 2018-12-20 DIAGNOSIS — G89.29 CHRONIC BILATERAL LOW BACK PAIN WITH BILATERAL SCIATICA: ICD-10-CM

## 2018-12-20 DIAGNOSIS — M54.42 CHRONIC BILATERAL LOW BACK PAIN WITH BILATERAL SCIATICA: ICD-10-CM

## 2018-12-20 DIAGNOSIS — M54.41 CHRONIC BILATERAL LOW BACK PAIN WITH BILATERAL SCIATICA: ICD-10-CM

## 2018-12-20 RX ORDER — MELOXICAM 15 MG/1
TABLET ORAL
Qty: 30 TABLET | Refills: 1 | Status: SHIPPED | OUTPATIENT
Start: 2018-12-20 | End: 2019-01-28

## 2019-01-02 ENCOUNTER — TELEPHONE (OUTPATIENT)
Dept: FAMILY MEDICINE CLINIC | Facility: CLINIC | Age: 64
End: 2019-01-02

## 2019-01-03 DIAGNOSIS — M54.5 LOW BACK PAIN, UNSPECIFIED BACK PAIN LATERALITY, UNSPECIFIED CHRONICITY, WITH SCIATICA PRESENCE UNSPECIFIED: Primary | ICD-10-CM

## 2019-01-05 LAB
CYTOLOGIST CVX/VAG CYTO: NORMAL
DX ICD CODE: NORMAL
HPV I/H RISK 1 DNA CVX QL PROBE+SIG AMP: NEGATIVE
OTHER STN SPEC: NORMAL
PATH REPORT.FINAL DX SPEC: NORMAL
SL AMB NOTE:: NORMAL
SL AMB SPECIMEN ADEQUACY: NORMAL
SL AMB TEST METHODOLOGY: NORMAL

## 2019-01-07 ENCOUNTER — HOSPITAL ENCOUNTER (OUTPATIENT)
Dept: MRI IMAGING | Facility: HOSPITAL | Age: 64
Discharge: HOME | End: 2019-01-07
Payer: COMMERCIAL

## 2019-01-07 DIAGNOSIS — M54.5 LOW BACK PAIN, UNSPECIFIED BACK PAIN LATERALITY, UNSPECIFIED CHRONICITY, WITH SCIATICA PRESENCE UNSPECIFIED: ICD-10-CM

## 2019-01-07 PROCEDURE — 72148 MRI LUMBAR SPINE W/O DYE: CPT

## 2019-01-09 ENCOUNTER — TELEPHONE (OUTPATIENT)
Dept: FAMILY MEDICINE CLINIC | Facility: CLINIC | Age: 64
End: 2019-01-09

## 2019-01-09 DIAGNOSIS — M79.605 LUMBAR PAIN WITH RADIATION DOWN BOTH LEGS: Primary | ICD-10-CM

## 2019-01-09 DIAGNOSIS — M54.50 LUMBAR PAIN WITH RADIATION DOWN BOTH LEGS: Primary | ICD-10-CM

## 2019-01-09 DIAGNOSIS — M79.604 LUMBAR PAIN WITH RADIATION DOWN BOTH LEGS: Primary | ICD-10-CM

## 2019-01-09 NOTE — TELEPHONE ENCOUNTER
----- Message from Arjun Ryan MD sent at 1/7/2019  4:37 PM EST -----  Call patient regarding test   Patient has some significant degenerative changes of the spine as well as a cyst   Consider pain management evaluation in light of her radicular symptoms

## 2019-01-23 ENCOUNTER — TRANSCRIBE ORDERS (OUTPATIENT)
Dept: ADMINISTRATIVE | Facility: HOSPITAL | Age: 64
End: 2019-01-23

## 2019-01-23 DIAGNOSIS — Z12.39 SCREENING BREAST EXAMINATION: Primary | ICD-10-CM

## 2019-01-28 ENCOUNTER — CLINICAL SUPPORT (OUTPATIENT)
Dept: PAIN MEDICINE | Facility: CLINIC | Age: 64
End: 2019-01-28
Payer: COMMERCIAL

## 2019-01-28 VITALS
SYSTOLIC BLOOD PRESSURE: 138 MMHG | DIASTOLIC BLOOD PRESSURE: 78 MMHG | BODY MASS INDEX: 22.26 KG/M2 | TEMPERATURE: 98.7 F | WEIGHT: 121 LBS | HEIGHT: 62 IN | HEART RATE: 60 BPM

## 2019-01-28 DIAGNOSIS — M79.604 LUMBAR PAIN WITH RADIATION DOWN BOTH LEGS: ICD-10-CM

## 2019-01-28 DIAGNOSIS — M79.605 LUMBAR PAIN WITH RADIATION DOWN BOTH LEGS: ICD-10-CM

## 2019-01-28 DIAGNOSIS — M54.50 LUMBAR PAIN WITH RADIATION DOWN BOTH LEGS: ICD-10-CM

## 2019-01-28 DIAGNOSIS — M54.16 LUMBAR RADICULOPATHY: Primary | ICD-10-CM

## 2019-01-28 DIAGNOSIS — M48.062 SPINAL STENOSIS OF LUMBAR REGION WITH NEUROGENIC CLAUDICATION: ICD-10-CM

## 2019-01-28 DIAGNOSIS — M71.38 SYNOVIAL CYST OF LUMBAR FACET JOINT: ICD-10-CM

## 2019-01-28 PROCEDURE — 99204 OFFICE O/P NEW MOD 45 MIN: CPT | Performed by: ANESTHESIOLOGY

## 2019-01-28 NOTE — PROGRESS NOTES
Assessment:  1  Lumbar radiculopathy    2  Lumbar pain with radiation down both legs    3  Synovial cyst of lumbar facet joint    4  Spinal stenosis of lumbar region with neurogenic claudication        Plan:  72-year-old female presenting for initial consultation regarding a 9 month history of lumbosacral back pain with radicular symptoms in the L4 through S1 distribution of bilateral lower extremities more so on the right than the left  She denies any trauma or inciting event  MRI of the lumbar spine revealed slight anterolisthesis of L3 on L4 and spondylosis from L3-4 to L5-S1  There was a right synovial facet cyst at L3-4 causing significant central and lateral recess stenosis with potential impact on the right L4 nerve root  There is also a 12 mm signal abnormality noted within the marrow of the upper L1 vertebral body  Radiologist suggests repeat MRI in 6 months  The patient is currently taking Aleve p r n  With minimal to mild relief  She has not found any relief with physical therapy  The patient's radicular symptoms are likely stemming from central and lateral recess stenosis at L3-4 secondary to right L3-4 facet joint cyst   1  I will schedule the patient for bilateral L3 TFESI with right L3-4 facet joint cyst aspiration/injection to reduce the inflammatory component of her pain and hopefully reduce and/or collapse facet joint cyst   2  The patient may continue with Aleve p r n  And should not exceed more than 500 mg of naproxen b i d  P r n   3  The patient will continue with her home exercise program  4  The patient may benefit from a trial of gabapentin, however she wishes to hold off on sedating medications at this time  5  I will follow up the patient in 2 months    Complete risks and benefits including bleeding, infection, tissue reaction, nerve injury and allergic reaction were discussed  The approach was demonstrated using models and literature was provided   Verbal and written consent was obtained  My impressions and treatment recommendations were discussed in detail with the patient who verbalized understanding and had no further questions  Discharge instructions were provided  I personally saw and examined the patient and I agree with the above discussed plan of care  No orders of the defined types were placed in this encounter  No orders of the defined types were placed in this encounter  History of Present Illness:    Hong Pfeiffer is a 66 y o  female presenting for initial consultation regarding lumbosacral back pain that radiates into the anterior, lateral, and posterior aspects of her lower extremities more so on the right than the left with associated numbness and occasional subjective weakness  She denies any bladder or bowel incontinence or saddle anesthesia  She denies any trauma or inciting event  MRI of the lumbar spine revealed slight anterolisthesis of L3 on L4 and spondylosis from L3-4 to L5-S1  There was a right synovial facet cyst at L3-4 causing significant central and lateral recess stenosis with potential impact on the right L4 nerve root  There is also a 12 mm signal abnormality noted within the marrow of the upper L1 vertebral body  Radiologist suggests repeat MRI in 6 months  The patient is currently taking Aleve p r n  With minimal to mild relief  She has not found any relief with physical therapy  The patient rates her pain a 10/10 on the pain is nearly constant  The pain does not follow any particular pattern throughout the day  The pain is described as sharp and shooting  The pain is decreased with lying down  The pain is increased with standing, bending, sitting, walking, exercise, and coughing/sneezing  I have personally reviewed and/or updated the patient's past medical history, past surgical history, family history, social history, current medications, allergies, and vital signs today       Other than as stated above, the patient denies any interval changes in medications, medical condition, mental condition, symptoms, or allergies since the last office visit  Review of Systems:    Review of Systems   Constitutional: Negative for fever and unexpected weight change  HENT: Negative for trouble swallowing  Eyes: Negative for visual disturbance  Respiratory: Negative for shortness of breath and wheezing  Cardiovascular: Negative for chest pain and palpitations  Gastrointestinal: Negative for constipation, diarrhea, nausea and vomiting  Endocrine: Negative for cold intolerance, heat intolerance and polydipsia  Genitourinary: Negative for difficulty urinating and frequency  Musculoskeletal: Positive for gait problem  Negative for arthralgias, joint swelling and myalgias  Decreased ROM   Skin: Negative for rash  Neurological: Negative for dizziness, seizures, syncope, weakness and headaches  Depression   Hematological: Does not bruise/bleed easily  Psychiatric/Behavioral: Negative for dysphoric mood  All other systems reviewed and are negative  Patient Active Problem List   Diagnosis    Cervical spinal stenosis    Female stress incontinence    Pure hypercholesterolemia    Iron deficiency anemia    Stroke ([facility] [address] )    SI (sacroiliac) joint dysfunction    Chronic bilateral low back pain       Past Medical History:   Diagnosis Date    Depression     Depression     Depression     Hyperlipidemia     Hyperlipidemia     Iron deficiency anemia     Meralgia paresthetica     Sciatica     right side    Stroke [facility])     embolic       Past Surgical History:   Procedure Laterality Date     SECTION      COLONOSCOPY      HYSTEROSCOPY      GA COLONOSCOPY FLX DX W/COLLJ SPEC WHEN PFRMD N/A 2017    Procedure: COLONOSCOPY;  Surgeon: Amaya Mitchell DO;  Location: [facility] GI LAB;   Service: Gastroenterology    TUBAL LIGATION         Family History   Problem Relation Age of Onset    Hyperlipidemia Mother     Heart disease Mother 64        alive in [de-identified]    Stroke Mother     Hypertension Family     Stroke Family     Depression Sister     Hypertension Sister        Social History     Occupational History          Social History Main Topics    Smoking status: Never Smoker    Smokeless tobacco: Never Used    Alcohol use Yes      Comment: socially    Drug use: Unknown    Sexual activity: Not on file       Current Outpatient Prescriptions on File Prior to Visit   Medication Sig    ASPIRIN EC PO Take 325 mg by mouth daily    estradiol (ESTRACE VAGINAL) 0 1 mg/g vaginal cream Insert 1 g into the vagina once a week    [DISCONTINUED] meloxicam (MOBIC) 15 mg tablet TAKE 1 TABLET BY MOUTH EVERY DAY     No current facility-administered medications on file prior to visit  Allergies   Allergen Reactions    Paxil [Paroxetine] Rash       Physical Exam:    Temp 98 7 °F (37 1 °C) (Oral)   Ht 5' 2" (1 575 m)   Wt 54 9 kg (121 lb)   BMI 22 13 kg/m²     Constitutional: normal, well developed, well nourished, alert, in no distress and non-toxic and no overt pain behavior  Eyes: anicteric  HEENT: grossly intact  Neck: supple, symmetric, trachea midline and no masses   Pulmonary:even and unlabored  Cardiovascular:No edema or pitting edema present  Skin:Normal without rashes or lesions and well hydrated  Psychiatric:Mood and affect appropriate  Neurologic:Cranial Nerves II-XII grossly intact  Musculoskeletal:normal gait  Bilateral lumbar paraspinals mildly tender to palpation from L4-S1  Bilateral SI joints nontender to palpation  Bilateral greater trochanters nontender to palpation  Bilateral patellar and Achilles reflexes were 2/4 and symmetrical   No clonus was noted bilaterally  Bilateral lower extremity strength 5/5 in all muscle groups  Sensation intact to light touch in the L3 thru S1 dermatomes bilaterally  Positive straight leg raise on the right and negative on the left  Negative Derick's test bilaterally  Imaging      PACS Images     Show images for XR spine lumbar minimum 4 views non injury   Order Report      Order Details   Order Questions     Question Answer Comment   Exam reason Chronic low back pain    Note:  Enter reason for exam          Reason For Exam     Chronic low back pain   Dx: SI (sacroiliac) joint dysfunction [M53 3 (ICD-10-CM)]; Chronic bilateral low back pain with bilateral sciatica [M54 42, M54 41, G89 29 (ICD-10-CM)]   Study Result     LUMBAR SPINE     INDICATION:   M53 3: Sacrococcygeal disorders, not elsewhere classified  M54 42: Lumbago with sciatica, left side  M54 41: Lumbago with sciatica, right side  G89 29: Other chronic pain      COMPARISON:  None available     VIEWS:  XR SPINE LUMBAR MINIMUM 4 VIEWS NON INJURY        FINDINGS:     There is no evidence of acute fracture or destructive osseous lesion  There is a rudimentary disc space at S1-S2      Lumbar lordosis is preserved  2 to 3 mm grade 1 anterolisthesis L3 on 4      Minimal endplate degenerative disease      The pedicles appear intact      Soft tissues are unremarkable      IMPRESSION:  No acute findings      Workstation performed: [id number]               PACS Images     Show images for MRI lumbar spine wo contrast   Order Report      Order Details   Order Questions     Question Answer Comment   What is the patient's sedation requirement? No Sedation           Reason For Exam     Abnormal xray, lumbar spine, DJD   Dx: Low back pain, unspecified back pain laterality, unspecified chronicity, with sciatica presence unspecified [M54 5 (ICD-10-CM)]   Study Result     MRI LUMBAR SPINE WITHOUT CONTRAST     INDICATION: M54 5: Low back pain     Pain radiating down both legs, right calf numbness     COMPARISON:  None      TECHNIQUE:  Sagittal T1, sagittal T2, sagittal inversion recovery, axial T1 and axial T2, coronal T2        IMAGE QUALITY:  Diagnostic     FINDINGS:     VERTEBRAL BODIES:  Normal alignment of the lumbar spine  No spondylolysis or spondylolisthesis  No scoliosis  No compression fracture  12 mm focus of signal abnormality in the left paramedian posterior L1 vertebral body, series 4 image 7  This is   low signal on T1 and T2 sequences and only slightly greater than surrounding marrow on STIR sequence  This is nonspecific  Repeat MR with contrast suggested in 6 months, to reevaluate this      SACRUM:  Normal signal within the sacrum  No evidence of insufficiency or stress fracture      DISTAL CORD AND CONUS:  Normal size and signal within the distal cord and conus        PARASPINAL SOFT TISSUES:  Paraspinal soft tissues are unremarkable      LOWER THORACIC DISC SPACES:  Normal disc height and signal   No disc herniation, canal stenosis or foraminal narrowing      LUMBAR DISC SPACES:     L1-L2:  Normal      L2-L3:  Normal      L3-L4:  Degenerative grade 1 anterolisthesis  Right greater than left facet arthrosis and right 11 mm synovial facet cyst are present  AP dimension of the sac is reduced to approximately 6 mm at 11 below the disc space  Potential impact right L4 root  Correlate for signs and symptoms of spinal stenosis and right L4 radiculitis      L4-L5:  Bilateral facet arthrosis, minor bulge      L5-S1:  Mild bilateral facet arthrosis, small central, noncompressive protrusion      IMPRESSION:     Spondylosis and osteoarthritis with grade 1 L3-L4 anterolisthesis where, complicated by right synovial facet cyst, there is moderate narrowing of the canal and lateral recess  Correlate for signs and symptoms of spinal stenosis and, right L4   radiculitis     12 mm nonspecific signal abnormality posteriorly, within the marrow of the upper L1 vertebral body    Repeat MR with contrast suggested in 6 months         Workstation performed: [id number]

## 2019-01-30 ENCOUNTER — HOSPITAL ENCOUNTER (OUTPATIENT)
Dept: MAMMOGRAPHY | Facility: MEDICAL CENTER | Age: 64
Discharge: HOME | End: 2019-01-30
Payer: COMMERCIAL

## 2019-01-30 VITALS — BODY MASS INDEX: 21.44 KG/M2 | HEIGHT: 63 IN | WEIGHT: 121 LBS

## 2019-01-30 DIAGNOSIS — Z12.39 SCREENING BREAST EXAMINATION: ICD-10-CM

## 2019-01-30 PROCEDURE — 77067 SCR MAMMO BI INCL CAD: CPT

## 2019-01-30 PROCEDURE — 77063 BREAST TOMOSYNTHESIS BI: CPT

## 2019-02-20 ENCOUNTER — HOSPITAL ENCOUNTER (OUTPATIENT)
Dept: RADIOLOGY | Facility: CLINIC | Age: 64
Discharge: HOME | End: 2019-02-20
Admitting: ANESTHESIOLOGY
Payer: COMMERCIAL

## 2019-02-20 VITALS
RESPIRATION RATE: 18 BRPM | SYSTOLIC BLOOD PRESSURE: 107 MMHG | TEMPERATURE: 98.3 F | DIASTOLIC BLOOD PRESSURE: 67 MMHG | HEART RATE: 52 BPM | OXYGEN SATURATION: 98 %

## 2019-02-20 DIAGNOSIS — M54.16 LUMBAR RADICULOPATHY: ICD-10-CM

## 2019-02-20 DIAGNOSIS — M71.38 SYNOVIAL CYST OF LUMBAR FACET JOINT: ICD-10-CM

## 2019-02-20 PROCEDURE — 64493 INJ PARAVERT F JNT L/S 1 LEV: CPT | Performed by: ANESTHESIOLOGY

## 2019-02-20 PROCEDURE — 64483 NJX AA&/STRD TFRM EPI L/S 1: CPT | Performed by: ANESTHESIOLOGY

## 2019-02-20 RX ORDER — LIDOCAINE HYDROCHLORIDE 10 MG/ML
5 INJECTION, SOLUTION EPIDURAL; INFILTRATION; INTRACAUDAL; PERINEURAL ONCE
Status: COMPLETED | OUTPATIENT
Start: 2019-02-20 | End: 2019-02-20

## 2019-02-20 RX ORDER — BUPIVACAINE HCL/PF 2.5 MG/ML
30 VIAL (ML) INJECTION ONCE
Status: COMPLETED | OUTPATIENT
Start: 2019-02-20 | End: 2019-02-20

## 2019-02-20 RX ORDER — PAPAVERINE HCL 150 MG
20 CAPSULE, EXTENDED RELEASE ORAL ONCE
Status: COMPLETED | OUTPATIENT
Start: 2019-02-20 | End: 2019-02-20

## 2019-02-20 RX ORDER — METHYLPREDNISOLONE ACETATE 40 MG/ML
20 INJECTION, SUSPENSION INTRA-ARTICULAR; INTRALESIONAL; INTRAMUSCULAR; PARENTERAL; SOFT TISSUE ONCE
Status: COMPLETED | OUTPATIENT
Start: 2019-02-20 | End: 2019-02-20

## 2019-02-20 RX ADMIN — DEXAMETHASONE SODIUM PHOSPHATE 15 MG: 10 INJECTION, SOLUTION INTRAMUSCULAR; INTRAVENOUS at 08:38

## 2019-02-20 RX ADMIN — LIDOCAINE HYDROCHLORIDE 2 ML: 20 INJECTION, SOLUTION EPIDURAL; INFILTRATION; INTRACAUDAL; PERINEURAL at 08:38

## 2019-02-20 RX ADMIN — LIDOCAINE HYDROCHLORIDE 2 ML: 10 INJECTION, SOLUTION EPIDURAL; INFILTRATION; INTRACAUDAL; PERINEURAL at 08:39

## 2019-02-20 RX ADMIN — LIDOCAINE HYDROCHLORIDE 4 ML: 10 INJECTION, SOLUTION EPIDURAL; INFILTRATION; INTRACAUDAL; PERINEURAL at 08:32

## 2019-02-20 RX ADMIN — IOHEXOL 3 ML: 300 INJECTION, SOLUTION INTRAVENOUS at 08:35

## 2019-02-20 RX ADMIN — BUPIVACAINE HYDROCHLORIDE 0.5 ML: 2.5 INJECTION, SOLUTION EPIDURAL; INFILTRATION; INTRACAUDAL at 08:41

## 2019-02-20 RX ADMIN — METHYLPREDNISOLONE ACETATE 20 MG: 40 INJECTION, SUSPENSION INTRA-ARTICULAR; INTRALESIONAL; INTRAMUSCULAR; SOFT TISSUE at 08:41

## 2019-02-20 NOTE — DISCHARGE INSTR - LAB
Epidural Steroid Injection   WHAT YOU NEED TO KNOW:   An epidural steroid injection (KIRAN) is a procedure to inject steroid medicine into the epidural space  The epidural space is between your spinal cord and vertebrae  Steroids reduce inflammation and fluid buildup in your spine that may be causing pain  You may be given pain medicine along with the steroids  ACTIVITY  · Do not drive or operate machinery today  · No strenuous activity today  bending, lifting, etc   · You may resume normal activites starting tomorrow  start slowly and as tolerated  · You may shower today, but no tub baths or hot tubs  · You may have numbness for several hours from the local anesthetic  Please use caution and common sense, especially with weight-bearing activities  CARE OF THE INJECTION SITE  · If you have soreness or pain, apply ice to the area today (20 minutes on/20 minutes off)  · Starting tomorrow, you may use warm, moist heat or ice if needed  · You may have an increase or change in your discomfort for 36-48 hours after your treatment  · Apply ice and continue with any pain medication you have been prescribed  · Notify [facility] if you have any of the following: redness, drainage, swelling, headache, stiff neck or fever above 100°F     SPECIAL INSTRUCTIONS  · Our office will contact you in approximately 7 days for a progress report  MEDICATIONS  · Continue to take all routine medications  · Our office may have instructed you to hold some medications  If you have a problem specifically related to your procedure, please call our office at [phone]  Problems not related to your procedure should be directed to your primary care physician

## 2019-02-20 NOTE — H&P
History of Present Illness: The patient is a 60 y o  female who presents with complaints of low back and leg pain  Patient Active Problem List   Diagnosis    Cervical spinal stenosis    Female stress incontinence    Pure hypercholesterolemia    Iron deficiency anemia    Stroke ([facility] [address]    SI (sacroiliac) joint dysfunction    Chronic bilateral low back pain    Synovial cyst of lumbar facet joint    Lumbar radiculopathy    Spinal stenosis of lumbar region with neurogenic claudication       Past Medical History:   Diagnosis Date    Cervical ca ([facility] [address] 1979    Depression     Depression     Depression     Hyperlipidemia     Hyperlipidemia     Iron deficiency anemia     Meralgia paresthetica     Sciatica     right side    Stroke [facility])     embolic       Past Surgical History:   Procedure Laterality Date     SECTION      COLONOSCOPY      HYSTEROSCOPY      CT COLONOSCOPY FLX DX W/COLLJ SPEC WHEN PFRMD N/A 2017    Procedure: COLONOSCOPY;  Surgeon: Ayla Orosco DO;  Location: [facility] GI LAB; Service: Gastroenterology    TUBAL LIGATION           Current Outpatient Medications:     ASPIRIN EC PO, Take 325 mg by mouth daily, Disp: , Rfl:     estradiol (ESTRACE VAGINAL) 0 1 mg/g vaginal cream, Insert 1 g into the vagina once a week, Disp: 42 5 g, Rfl: 3    Allergies   Allergen Reactions    Paxil [Paroxetine] Rash       Physical Exam:   Vitals:    19 0811   BP: 143/84   Pulse: 55   Resp: 18   Temp: 98 3 °F (36 8 °C)   SpO2: 97%     General: Awake, Alert, Oriented x 3, Mood and affect appropriate  Respiratory: Respirations even and unlabored  Cardiovascular: Peripheral pulses intact; no edema  Musculoskeletal Exam:  Bilateral lumbar paraspinals tender to palpation  ASA Score: 2    Patient/Chart Verification  Patient ID Verified: Verbal  ID Band Applied: No  Consents Confirmed: Procedural  H&P( within 30 days) Verified:  To be obtained in the Pre-Procedure area  Interval H&P(within 24 hr) Complete (required for Outpatients and Surgery Admit only): To be obtained in the Pre-Procedure area  Allergies Reviewed: Yes  Anticoag/NSAID held?: No  Currently on antibiotics?: No    Assessment:   1  Lumbar radiculopathy    2   Synovial cyst of lumbar facet joint        Plan: lumbar radiculopathy and lumbar facet cyst - Bilateral L3 TFESI and Right L3-4 facet joint aspiration and injection

## 2019-02-27 ENCOUNTER — TELEPHONE (OUTPATIENT)
Dept: PAIN MEDICINE | Facility: CLINIC | Age: 64
End: 2019-02-27

## 2019-03-06 ENCOUNTER — TELEPHONE (OUTPATIENT)
Dept: OBGYN CLINIC | Facility: HOSPITAL | Age: 64
End: 2019-03-06

## 2019-03-06 DIAGNOSIS — M54.16 LUMBAR RADICULOPATHY: Primary | ICD-10-CM

## 2019-03-06 DIAGNOSIS — M71.38 SYNOVIAL CYST OF LUMBAR FACET JOINT: ICD-10-CM

## 2019-03-06 DIAGNOSIS — M48.062 SPINAL STENOSIS OF LUMBAR REGION WITH NEUROGENIC CLAUDICATION: ICD-10-CM

## 2019-03-06 NOTE — TELEPHONE ENCOUNTER
Patient is calling back it is 2 weeks after her procedure and she is not doing any better and would like the referral to be put in for neuro surg  Her family doctor recommended maurice vivar or if there is someone else the doctor recommends

## 2019-03-08 ENCOUNTER — CONSULT (OUTPATIENT)
Dept: NEUROSURGERY | Facility: CLINIC | Age: 64
End: 2019-03-08
Payer: COMMERCIAL

## 2019-03-08 VITALS
RESPIRATION RATE: 16 BRPM | BODY MASS INDEX: 21.44 KG/M2 | HEIGHT: 63 IN | TEMPERATURE: 99.1 F | SYSTOLIC BLOOD PRESSURE: 122 MMHG | HEART RATE: 66 BPM | WEIGHT: 121 LBS | DIASTOLIC BLOOD PRESSURE: 76 MMHG

## 2019-03-08 DIAGNOSIS — M54.16 LUMBAR RADICULOPATHY: ICD-10-CM

## 2019-03-08 DIAGNOSIS — M71.38 SYNOVIAL CYST OF LUMBAR FACET JOINT: ICD-10-CM

## 2019-03-08 DIAGNOSIS — M43.16 SPONDYLOLISTHESIS OF LUMBAR REGION: Primary | ICD-10-CM

## 2019-03-08 DIAGNOSIS — M48.062 SPINAL STENOSIS OF LUMBAR REGION WITH NEUROGENIC CLAUDICATION: ICD-10-CM

## 2019-03-08 PROCEDURE — 99205 OFFICE O/P NEW HI 60 MIN: CPT | Performed by: NEUROLOGICAL SURGERY

## 2019-03-08 NOTE — PROGRESS NOTES
Neurosurgery Office Note  Julissa Fajardo 60 y o  female MRN: [id number]      Assessment/Plan      Diagnoses and all orders for this visit:    Spondylolisthesis of lumbar region    Lumbar radiculopathy  -     Ambulatory referral to Neurosurgery    Synovial cyst of lumbar facet joint  -     Ambulatory referral to Neurosurgery    Spinal stenosis of lumbar region with neurogenic claudication  -     Ambulatory referral to Neurosurgery          Discussion:    43-year-old woman referred from Dr Vic Guadalupe and Dr Kelly Perkins  Has had relatively mild back pain for years, but starting last summer, 2018, began to have low back pain that radiates into the right leg  Involves the posterior hamstring, and down below the knee  Occasional has numbness in the right shin  Certain movements will insight this pain, but there is no definitive consistency of what kinds of movements  When the pain does shoots down her leg and into her back, severity 10/10 of both  Previously that would last for seconds, but now these are becoming more frequent, and last for 2-3 minutes at a time  Involves the right leg medications the left  Palliative by lying down her leaning backwards  She has had no falls, uses no ambulatory aids  In fact, and till this happened, she has been quite active  She would exercise regularly, and has a sail boat, which she intends the sail during season, and even retire on in a few years  MRI lumbar spine from 1/2019 shows spondylolisthesis grade 1 L3-4, with try compartmental narrowing/stenosis, as well facet cyst from the right contributing to the stenosis  Upright x-rays from 12/2018 shows this listhesis, which does not clearly progress once upright  However these are not flexion-extension views  She did try physical therapy, and underwent an KIRAN and attempted cyst aspiration with Dr Kelly Perkins on 2/20/19  This perhaps offered her a day or 2 relief when she was less active, but no improvement      I reviewed the patient her imaging studies, showing her tricompartmental narrowing at L3-4, including her synovial cyst on the right  The spondylolisthesis is suggestive of joint incompetence at this level, which facilitated the stenosis and synovial cyst   I discussed with her options for management ranging from conservative to surgical   She has tried 4 months various conservative measures such as physical therapy, pain management etc   With her age, performance status, fitness, and intent for active lifestyle etc, I feel surgical decompression alone would not be a durable solution  Instead, I have recommended, and the plan is for MIS navigated lumbar L3-4 laminectomy decompression (right approach), transforaminal lumbar interbody fusion  Expected benefits, alternatives, and attendant risks were reviewed in detail, including but not limited to infection, bleeding, VTE, CSF leak, hardware failure, transient or permanent neurologic dysfunction, etc   She would like to proceed  Written consent obtained  Patient does have history of stroke 12 years ago  After treatment at [facility], it ultimately was felt this was related to taking her hormonal replacement therapy at that time  She states she has had no further events, etc   She does take full-dose aspirin, which I explained would need to be held at least 5 days prior to surgery and likely would be restarted 1 week after surgery  Metrics: leg pain 10/10, back pain 10/10, MANUEL 44%, EQ5D3L 22529, VAS 50  CHIEF COMPLAINT    Chief Complaint   Patient presents with    Consult     NP Consult for L-spine evaluation; referred by Dr Celia Reveles    History of Present Illness     60y o  year old female     HPI    See Discussion    REVIEW OF SYSTEMS    Review of Systems   Constitutional: Positive for activity change (unable to exercise x 3 months)  HENT: Negative  Eyes: Negative  Respiratory: Negative  Cardiovascular: Negative      Gastrointestinal: Negative  Endocrine: Negative  Genitourinary: Negative  Musculoskeletal: Positive for arthralgias and back pain  LBP shoots to hips, buttocks, upper legs R>L, rarely to R calf, with occasional right shin numbness  Frequently throughout day, typically lasting 2-3 minutes, occasional as long as 15 min, sometimes brief   Skin: Negative  Allergic/Immunologic: Negative  Neurological: Positive for numbness (occasional right shin)  Hematological: Bruises/bleeds easily  Psychiatric/Behavioral: Positive for dysphoric mood (depressed moods, worse since unable to exercise)  Meds/Allergies     Current Outpatient Medications   Medication Sig Dispense Refill    ASPIRIN EC PO Take 325 mg by mouth daily      estradiol (ESTRACE VAGINAL) 0 1 mg/g vaginal cream Insert 1 g into the vagina once a week 42 5 g 3     No current facility-administered medications for this visit  Allergies   Allergen Reactions    Paxil [Paroxetine] Rash       PAST HISTORY    Past Medical History:   Diagnosis Date    Cervical ca ([facility] [address] )     Depression     Depression     Depression     Hyperlipidemia     Hyperlipidemia     Iron deficiency anemia     Meralgia paresthetica     Sciatica     right side    Stroke [facility])     embolic       Past Surgical History:   Procedure Laterality Date     SECTION      COLONOSCOPY      HYSTEROSCOPY      MN COLONOSCOPY FLX DX W/COLLJ SPEC WHEN PFRMD N/A 2017    Procedure: COLONOSCOPY;  Surgeon: Freda Humphreys DO;  Location: [facility];   Service: Gastroenterology    TUBAL LIGATION         Social History     Tobacco Use    Smoking status: Never Smoker    Smokeless tobacco: Never Used   Substance Use Topics    Alcohol use: Yes     Comment: socially    Drug use: Not on file       Family History   Problem Relation Age of Onset    Hyperlipidemia Mother     Heart disease Mother 64        alive in [de-identified] Stroke Mother     Hypertension Family     Stroke Family     Depression Sister     Hypertension Sister          Above history personally reviewed  EXAM    Vitals:Blood pressure 122/76, pulse 66, temperature 99 1 °F (37 3 °C), temperature source Tympanic, resp  rate 16, height 5' 2 5" (1 588 m), weight 54 9 kg (121 lb)  ,Body mass index is 21 78 kg/m²  Physical Exam    Neurologic Exam      MEDICAL DECISION MAKING    Imaging Studies:     MRI [facility] 1/3/19: IMPRESSION:     Spondylosis and osteoarthritis with grade 1 L3-L4 anterolisthesis where, complicated by right synovial facet cyst, there is moderate narrowing of the canal and lateral recess  Correlate for signs and symptoms of spinal stenosis and, right L4   radiculitis     12 mm nonspecific signal abnormality posteriorly, within the marrow of the upper L1 vertebral body  Repeat MR with contrast suggested in 6 months  XR [facility] 11/23/18:    FINDINGS:     There is no evidence of acute fracture or destructive osseous lesion  There is a rudimentary disc space at S1-S2      Lumbar lordosis is preserved  2 to 3 mm grade 1 anterolisthesis L3 on 4      Minimal endplate degenerative disease      The pedicles appear intact      Soft tissues are unremarkable      IMPRESSION:  No acute findings  I have personally reviewed pertinent reports     and I have personally reviewed pertinent films in PACS

## 2019-03-15 ENCOUNTER — APPOINTMENT (OUTPATIENT)
Dept: LAB | Facility: HOSPITAL | Age: 64
End: 2019-03-15
Attending: NEUROLOGICAL SURGERY
Payer: COMMERCIAL

## 2019-03-15 ENCOUNTER — HOSPITAL ENCOUNTER (OUTPATIENT)
Dept: NON INVASIVE DIAGNOSTICS | Facility: HOSPITAL | Age: 64
Discharge: HOME | End: 2019-03-15
Attending: NEUROLOGICAL SURGERY
Payer: COMMERCIAL

## 2019-03-15 ENCOUNTER — HOSPITAL ENCOUNTER (OUTPATIENT)
Dept: RADIOLOGY | Facility: HOSPITAL | Age: 64
Discharge: HOME | End: 2019-03-15
Attending: NEUROLOGICAL SURGERY
Payer: COMMERCIAL

## 2019-03-15 DIAGNOSIS — M43.16 SPONDYLOLISTHESIS OF LUMBAR REGION: ICD-10-CM

## 2019-03-15 DIAGNOSIS — M48.062 SPINAL STENOSIS OF LUMBAR REGION WITH NEUROGENIC CLAUDICATION: ICD-10-CM

## 2019-03-15 DIAGNOSIS — M71.38 SYNOVIAL CYST OF LUMBAR FACET JOINT: ICD-10-CM

## 2019-03-15 DIAGNOSIS — M54.16 LUMBAR RADICULOPATHY: ICD-10-CM

## 2019-03-15 LAB
ALBUMIN SERPL BCP-MCNC: 3.9 G/DL (ref 3.5–5)
ALP SERPL-CCNC: 72 U/L (ref 46–116)
ALT SERPL W P-5'-P-CCNC: 19 U/L (ref 12–78)
ANION GAP SERPL CALCULATED.3IONS-SCNC: 10 MMOL/L (ref 4–13)
APTT PPP: 28 SECONDS (ref 26–38)
AST SERPL W P-5'-P-CCNC: 17 U/L (ref 5–45)
BACTERIA UR QL AUTO: ABNORMAL /HPF
BASOPHILS # BLD AUTO: 0.05 THOUSANDS/ΜL (ref 0–0.1)
BASOPHILS NFR BLD AUTO: 1 % (ref 0–1)
BILIRUB SERPL-MCNC: 0.66 MG/DL (ref 0.2–1)
BILIRUB UR QL STRIP: NEGATIVE
BUN SERPL-MCNC: 15 MG/DL (ref 5–25)
CALCIUM SERPL-MCNC: 8.7 MG/DL (ref 8.3–10.1)
CHLORIDE SERPL-SCNC: 105 MMOL/L (ref 100–108)
CLARITY UR: ABNORMAL
CO2 SERPL-SCNC: 29 MMOL/L (ref 21–32)
COLOR UR: YELLOW
CREAT SERPL-MCNC: 1.03 MG/DL (ref 0.6–1.3)
EOSINOPHIL # BLD AUTO: 0.12 THOUSAND/ΜL (ref 0–0.61)
EOSINOPHIL NFR BLD AUTO: 2 % (ref 0–6)
ERYTHROCYTE [DISTWIDTH] IN BLOOD BY AUTOMATED COUNT: 12.8 % (ref 11.6–15.1)
EST. AVERAGE GLUCOSE BLD GHB EST-MCNC: 123 MG/DL
GFR SERPL CREATININE-BSD FRML MDRD: 58 ML/MIN/1.73SQ M
GLUCOSE P FAST SERPL-MCNC: 86 MG/DL (ref 65–99)
GLUCOSE UR STRIP-MCNC: NEGATIVE MG/DL
HBA1C MFR BLD: 5.9 % (ref 4.2–6.3)
HCT VFR BLD AUTO: 40.6 % (ref 34.8–46.1)
HGB BLD-MCNC: 13.5 G/DL (ref 11.5–15.4)
HGB UR QL STRIP.AUTO: NEGATIVE
IMM GRANULOCYTES # BLD AUTO: 0.01 THOUSAND/UL (ref 0–0.2)
IMM GRANULOCYTES NFR BLD AUTO: 0 % (ref 0–2)
INR PPP: 1 (ref 0.86–1.17)
KETONES UR STRIP-MCNC: NEGATIVE MG/DL
LEUKOCYTE ESTERASE UR QL STRIP: ABNORMAL
LYMPHOCYTES # BLD AUTO: 3.13 THOUSANDS/ΜL (ref 0.6–4.47)
LYMPHOCYTES NFR BLD AUTO: 54 % (ref 14–44)
MCH RBC QN AUTO: 30.8 PG (ref 26.8–34.3)
MCHC RBC AUTO-ENTMCNC: 33.3 G/DL (ref 31.4–37.4)
MCV RBC AUTO: 93 FL (ref 82–98)
MONOCYTES # BLD AUTO: 0.44 THOUSAND/ΜL (ref 0.17–1.22)
MONOCYTES NFR BLD AUTO: 8 % (ref 4–12)
NEUTROPHILS # BLD AUTO: 2.02 THOUSANDS/ΜL (ref 1.85–7.62)
NEUTS SEG NFR BLD AUTO: 35 % (ref 43–75)
NITRITE UR QL STRIP: NEGATIVE
NON-SQ EPI CELLS URNS QL MICRO: ABNORMAL /HPF
NRBC BLD AUTO-RTO: 0 /100 WBCS
PH UR STRIP.AUTO: 5.5 [PH]
PLATELET # BLD AUTO: 257 THOUSANDS/UL (ref 149–390)
PMV BLD AUTO: 10.1 FL (ref 8.9–12.7)
POTASSIUM SERPL-SCNC: 3.7 MMOL/L (ref 3.5–5.3)
PROT SERPL-MCNC: 7.5 G/DL (ref 6.4–8.2)
PROT UR STRIP-MCNC: NEGATIVE MG/DL
PROTHROMBIN TIME: 13.3 SECONDS (ref 11.8–14.2)
RBC # BLD AUTO: 4.39 MILLION/UL (ref 3.81–5.12)
RBC #/AREA URNS AUTO: ABNORMAL /HPF
SODIUM SERPL-SCNC: 144 MMOL/L (ref 136–145)
SP GR UR STRIP.AUTO: >=1.03 (ref 1–1.03)
UROBILINOGEN UR QL STRIP.AUTO: 0.2 E.U./DL
WBC # BLD AUTO: 5.77 THOUSAND/UL (ref 4.31–10.16)
WBC #/AREA URNS AUTO: ABNORMAL /HPF

## 2019-03-15 PROCEDURE — 36415 COLL VENOUS BLD VENIPUNCTURE: CPT

## 2019-03-15 PROCEDURE — 80053 COMPREHEN METABOLIC PANEL: CPT

## 2019-03-15 PROCEDURE — 72114 X-RAY EXAM L-S SPINE BENDING: CPT

## 2019-03-15 PROCEDURE — 85730 THROMBOPLASTIN TIME PARTIAL: CPT

## 2019-03-15 PROCEDURE — 85610 PROTHROMBIN TIME: CPT

## 2019-03-15 PROCEDURE — 83036 HEMOGLOBIN GLYCOSYLATED A1C: CPT

## 2019-03-15 PROCEDURE — 85025 COMPLETE CBC W/AUTO DIFF WBC: CPT

## 2019-03-15 PROCEDURE — 87081 CULTURE SCREEN ONLY: CPT

## 2019-03-15 PROCEDURE — 93005 ELECTROCARDIOGRAM TRACING: CPT | Performed by: NEUROLOGICAL SURGERY

## 2019-03-15 PROCEDURE — 81001 URINALYSIS AUTO W/SCOPE: CPT | Performed by: NEUROLOGICAL SURGERY

## 2019-03-16 LAB
ATRIAL RATE: 44 BPM
MRSA NOSE QL CULT: NORMAL
P AXIS: 76 DEGREES
PR INTERVAL: 148 MS
QRS AXIS: 82 DEGREES
QRSD INTERVAL: 84 MS
QT INTERVAL: 484 MS
QTC INTERVAL: 413 MS
T WAVE AXIS: 72 DEGREES
VENTRICULAR RATE: 44 BPM

## 2019-03-16 PROCEDURE — 93010 ELECTROCARDIOGRAM REPORT: CPT | Performed by: INTERNAL MEDICINE

## 2019-03-19 ENCOUNTER — TELEPHONE (OUTPATIENT)
Dept: PAIN MEDICINE | Facility: CLINIC | Age: 64
End: 2019-03-19

## 2019-03-19 NOTE — TELEPHONE ENCOUNTER
Erik Augustine Appointment canceled More Detail >>   Appointment canceled   Zina Tucker   Sent:   4:01 PM   To: [address]   MRN: [id number] : 1955   Pt Home: [phone]     Entered: [phone]        Message     Appointment canceled for Zina Tucker ([id number])   Visit Type: OFFICE VISIT SHORT [facility]   Date        Time      Length    Provider                  Department   3/25/2019    8:20 AM  20 mins  JUANITO Hunter       [facility] [place]      Reason for Cancellation: Unhappy/Changed Provider      Patient Comments: I am having back surgery  No need for an appointment

## 2019-03-20 ENCOUNTER — TELEPHONE (OUTPATIENT)
Dept: PREADMISSION TESTING | Facility: HOSPITAL | Age: 64
End: 2019-03-20

## 2019-03-27 DIAGNOSIS — N17.9 AKI (ACUTE KIDNEY INJURY) (HCC): Primary | ICD-10-CM

## 2019-04-01 ENCOUNTER — ANESTHESIA EVENT (OUTPATIENT)
Dept: PERIOP | Facility: HOSPITAL | Age: 64
DRG: 460 | End: 2019-04-01
Payer: COMMERCIAL

## 2019-04-02 ENCOUNTER — HOSPITAL ENCOUNTER (INPATIENT)
Facility: HOSPITAL | Age: 64
LOS: 3 days | Discharge: HOME HEALTH | DRG: 460 | End: 2019-04-05
Attending: NEUROLOGICAL SURGERY | Admitting: NEUROLOGICAL SURGERY
Payer: COMMERCIAL

## 2019-04-02 ENCOUNTER — APPOINTMENT (OUTPATIENT)
Dept: RADIOLOGY | Facility: HOSPITAL | Age: 64
DRG: 460 | End: 2019-04-02
Payer: COMMERCIAL

## 2019-04-02 ENCOUNTER — ANESTHESIA (OUTPATIENT)
Dept: PERIOP | Facility: HOSPITAL | Age: 64
DRG: 460 | End: 2019-04-02
Payer: COMMERCIAL

## 2019-04-02 DIAGNOSIS — K59.03 CONSTIPATION DUE TO PAIN MEDICATION: ICD-10-CM

## 2019-04-02 DIAGNOSIS — M71.38 SYNOVIAL CYST OF LUMBAR FACET JOINT: ICD-10-CM

## 2019-04-02 DIAGNOSIS — G89.18 POST-OP PAIN: ICD-10-CM

## 2019-04-02 DIAGNOSIS — E78.00 PURE HYPERCHOLESTEROLEMIA: ICD-10-CM

## 2019-04-02 DIAGNOSIS — M43.16 SPONDYLOLISTHESIS OF LUMBAR REGION: ICD-10-CM

## 2019-04-02 DIAGNOSIS — Z98.1 STATUS POST LUMBAR SPINAL FUSION: ICD-10-CM

## 2019-04-02 DIAGNOSIS — M54.16 LUMBAR RADICULOPATHY: ICD-10-CM

## 2019-04-02 DIAGNOSIS — M48.062 SPINAL STENOSIS OF LUMBAR REGION WITH NEUROGENIC CLAUDICATION: ICD-10-CM

## 2019-04-02 DIAGNOSIS — Z87.448 HISTORY OF RENAL INSUFFICIENCY: Primary | ICD-10-CM

## 2019-04-02 LAB
ABO GROUP BLD: NORMAL
BACTERIA UR QL AUTO: ABNORMAL /HPF
BILIRUB UR QL STRIP: ABNORMAL
BLD GP AB SCN SERPL QL: NEGATIVE
CLARITY UR: ABNORMAL
COLOR UR: ABNORMAL
GLUCOSE UR STRIP-MCNC: NEGATIVE MG/DL
HGB UR QL STRIP.AUTO: ABNORMAL
HYALINE CASTS #/AREA URNS LPF: ABNORMAL /LPF
KETONES UR STRIP-MCNC: NEGATIVE MG/DL
LEUKOCYTE ESTERASE UR QL STRIP: ABNORMAL
NITRITE UR QL STRIP: NEGATIVE
NON-SQ EPI CELLS URNS QL MICRO: ABNORMAL /HPF
PH UR STRIP.AUTO: 5.5 [PH]
PROT UR STRIP-MCNC: NEGATIVE MG/DL
RBC #/AREA URNS AUTO: ABNORMAL /HPF
RH BLD: NEGATIVE
SP GR UR STRIP.AUTO: 1.03 (ref 1–1.03)
SPECIMEN EXPIRATION DATE: NORMAL
UROBILINOGEN UR QL STRIP.AUTO: 0.2 E.U./DL
WBC #/AREA URNS AUTO: ABNORMAL /HPF

## 2019-04-02 PROCEDURE — C1713 ANCHOR/SCREW BN/BN,TIS/BN: HCPCS | Performed by: NEUROLOGICAL SURGERY

## 2019-04-02 PROCEDURE — 86850 RBC ANTIBODY SCREEN: CPT | Performed by: NEUROLOGICAL SURGERY

## 2019-04-02 PROCEDURE — 87086 URINE CULTURE/COLONY COUNT: CPT | Performed by: PHYSICIAN ASSISTANT

## 2019-04-02 PROCEDURE — 63267 EXCISE INTRSPINL LESION LMBR: CPT | Performed by: NEUROLOGICAL SURGERY

## 2019-04-02 PROCEDURE — 22840 INSERT SPINE FIXATION DEVICE: CPT | Performed by: NEUROLOGICAL SURGERY

## 2019-04-02 PROCEDURE — 61783 SCAN PROC SPINAL: CPT | Performed by: NEUROLOGICAL SURGERY

## 2019-04-02 PROCEDURE — 86901 BLOOD TYPING SEROLOGIC RH(D): CPT | Performed by: NEUROLOGICAL SURGERY

## 2019-04-02 PROCEDURE — 22633 ARTHRD CMBN 1NTRSPC LUMBAR: CPT | Performed by: NEUROLOGICAL SURGERY

## 2019-04-02 PROCEDURE — 86900 BLOOD TYPING SEROLOGIC ABO: CPT | Performed by: NEUROLOGICAL SURGERY

## 2019-04-02 PROCEDURE — 0SG00J1 FUSION OF LUMBAR VERTEBRAL JOINT WITH SYNTHETIC SUBSTITUTE, POSTERIOR APPROACH, POSTERIOR COLUMN, OPEN APPROACH: ICD-10-PCS | Performed by: NEUROLOGICAL SURGERY

## 2019-04-02 PROCEDURE — 22853 INSJ BIOMECHANICAL DEVICE: CPT | Performed by: NEUROLOGICAL SURGERY

## 2019-04-02 PROCEDURE — 81001 URINALYSIS AUTO W/SCOPE: CPT | Performed by: PHYSICIAN ASSISTANT

## 2019-04-02 PROCEDURE — 0SB20ZZ EXCISION OF LUMBAR VERTEBRAL DISC, OPEN APPROACH: ICD-10-PCS | Performed by: NEUROLOGICAL SURGERY

## 2019-04-02 PROCEDURE — 72100 X-RAY EXAM L-S SPINE 2/3 VWS: CPT

## 2019-04-02 PROCEDURE — 01NB0ZZ RELEASE LUMBAR NERVE, OPEN APPROACH: ICD-10-PCS | Performed by: NEUROLOGICAL SURGERY

## 2019-04-02 PROCEDURE — 99223 1ST HOSP IP/OBS HIGH 75: CPT | Performed by: INTERNAL MEDICINE

## 2019-04-02 DEVICE — SCREW 54850045540 4.75VOYAGER ATS 5.5X40
Type: IMPLANTABLE DEVICE | Site: SPINE LUMBAR | Status: FUNCTIONAL
Brand: CD HORIZON® SOLERA® VOYAGER™ SPINAL SYSTEM

## 2019-04-02 DEVICE — ROD 641000045 45MM PERC ROD 4.75MM CCM
Type: IMPLANTABLE DEVICE | Status: FUNCTIONAL
Brand: CD HORIZON® SOLERA® SPINAL SYSTEM

## 2019-04-02 DEVICE — DBM 005005 PROGENIX DBM 5 CC SRVC FEE
Type: IMPLANTABLE DEVICE | Site: SPINE LUMBAR | Status: FUNCTIONAL
Brand: PROGENIX® PUTTY AND PROGENIX® PLUS

## 2019-04-02 DEVICE — SCREW SET 4.75MM SOLERA PERC: Type: IMPLANTABLE DEVICE | Status: FUNCTIONAL

## 2019-04-02 DEVICE — ROD 641000050 50MM PERC ROD 4.75MM CCM
Type: IMPLANTABLE DEVICE | Status: FUNCTIONAL
Brand: CD HORIZON® SOLERA® SPINAL SYSTEM

## 2019-04-02 DEVICE — SPACER 7770928 ELEVATE STD 28X9MM
Type: IMPLANTABLE DEVICE | Site: SPINE LUMBAR | Status: FUNCTIONAL
Brand: ELEVATE™ SPINAL SYSTEM

## 2019-04-02 RX ORDER — PROPOFOL 10 MG/ML
INJECTION, EMULSION INTRAVENOUS AS NEEDED
Status: DISCONTINUED | OUTPATIENT
Start: 2019-04-02 | End: 2019-04-02 | Stop reason: SURG

## 2019-04-02 RX ORDER — HYDROMORPHONE HCL/PF 1 MG/ML
0.5 SYRINGE (ML) INJECTION
Status: DISCONTINUED | OUTPATIENT
Start: 2019-04-02 | End: 2019-04-02 | Stop reason: HOSPADM

## 2019-04-02 RX ORDER — SODIUM CHLORIDE 9 MG/ML
100 INJECTION, SOLUTION INTRAVENOUS CONTINUOUS
Status: DISCONTINUED | OUTPATIENT
Start: 2019-04-02 | End: 2019-04-05 | Stop reason: HOSPADM

## 2019-04-02 RX ORDER — FENTANYL CITRATE/PF 50 MCG/ML
25 SYRINGE (ML) INJECTION
Status: DISCONTINUED | OUTPATIENT
Start: 2019-04-02 | End: 2019-04-02 | Stop reason: HOSPADM

## 2019-04-02 RX ORDER — ONDANSETRON 2 MG/ML
4 INJECTION INTRAMUSCULAR; INTRAVENOUS EVERY 4 HOURS PRN
Status: DISCONTINUED | OUTPATIENT
Start: 2019-04-02 | End: 2019-04-05 | Stop reason: HOSPADM

## 2019-04-02 RX ORDER — ROCURONIUM BROMIDE 10 MG/ML
INJECTION, SOLUTION INTRAVENOUS AS NEEDED
Status: DISCONTINUED | OUTPATIENT
Start: 2019-04-02 | End: 2019-04-02 | Stop reason: SURG

## 2019-04-02 RX ORDER — BUPIVACAINE HYDROCHLORIDE AND EPINEPHRINE 5; 5 MG/ML; UG/ML
INJECTION, SOLUTION EPIDURAL; INTRACAUDAL; PERINEURAL AS NEEDED
Status: DISCONTINUED | OUTPATIENT
Start: 2019-04-02 | End: 2019-04-02 | Stop reason: HOSPADM

## 2019-04-02 RX ORDER — LIDOCAINE HYDROCHLORIDE AND EPINEPHRINE 10; 10 MG/ML; UG/ML
INJECTION, SOLUTION INFILTRATION; PERINEURAL AS NEEDED
Status: DISCONTINUED | OUTPATIENT
Start: 2019-04-02 | End: 2019-04-02 | Stop reason: HOSPADM

## 2019-04-02 RX ORDER — SODIUM CHLORIDE, SODIUM LACTATE, POTASSIUM CHLORIDE, CALCIUM CHLORIDE 600; 310; 30; 20 MG/100ML; MG/100ML; MG/100ML; MG/100ML
125 INJECTION, SOLUTION INTRAVENOUS CONTINUOUS
Status: DISCONTINUED | OUTPATIENT
Start: 2019-04-02 | End: 2019-04-03 | Stop reason: ALTCHOICE

## 2019-04-02 RX ORDER — NEOSTIGMINE METHYLSULFATE 1 MG/ML
INJECTION INTRAVENOUS AS NEEDED
Status: DISCONTINUED | OUTPATIENT
Start: 2019-04-02 | End: 2019-04-02 | Stop reason: SURG

## 2019-04-02 RX ORDER — CHLORHEXIDINE GLUCONATE 0.12 MG/ML
15 RINSE ORAL ONCE
Status: COMPLETED | OUTPATIENT
Start: 2019-04-02 | End: 2019-04-02

## 2019-04-02 RX ORDER — SODIUM CHLORIDE 9 MG/ML
INJECTION, SOLUTION INTRAVENOUS CONTINUOUS PRN
Status: DISCONTINUED | OUTPATIENT
Start: 2019-04-02 | End: 2019-04-02 | Stop reason: SURG

## 2019-04-02 RX ORDER — MIDAZOLAM HYDROCHLORIDE 1 MG/ML
INJECTION INTRAMUSCULAR; INTRAVENOUS AS NEEDED
Status: DISCONTINUED | OUTPATIENT
Start: 2019-04-02 | End: 2019-04-02 | Stop reason: SURG

## 2019-04-02 RX ORDER — CEFAZOLIN SODIUM 1 G/50ML
1000 SOLUTION INTRAVENOUS EVERY 8 HOURS
Status: COMPLETED | OUTPATIENT
Start: 2019-04-02 | End: 2019-04-03

## 2019-04-02 RX ORDER — FENTANYL CITRATE 50 UG/ML
INJECTION, SOLUTION INTRAMUSCULAR; INTRAVENOUS AS NEEDED
Status: DISCONTINUED | OUTPATIENT
Start: 2019-04-02 | End: 2019-04-02

## 2019-04-02 RX ORDER — GLYCOPYRROLATE 0.2 MG/ML
INJECTION INTRAMUSCULAR; INTRAVENOUS AS NEEDED
Status: DISCONTINUED | OUTPATIENT
Start: 2019-04-02 | End: 2019-04-02 | Stop reason: SURG

## 2019-04-02 RX ORDER — OXYCODONE HYDROCHLORIDE 5 MG/1
5 TABLET ORAL EVERY 4 HOURS PRN
Status: DISCONTINUED | OUTPATIENT
Start: 2019-04-02 | End: 2019-04-05 | Stop reason: HOSPADM

## 2019-04-02 RX ORDER — VANCOMYCIN HYDROCHLORIDE 1 G/20ML
INJECTION, POWDER, LYOPHILIZED, FOR SOLUTION INTRAVENOUS AS NEEDED
Status: DISCONTINUED | OUTPATIENT
Start: 2019-04-02 | End: 2019-04-02 | Stop reason: HOSPADM

## 2019-04-02 RX ORDER — CEFAZOLIN SODIUM 1 G/50ML
1000 SOLUTION INTRAVENOUS ONCE
Status: COMPLETED | OUTPATIENT
Start: 2019-04-02 | End: 2019-04-02

## 2019-04-02 RX ORDER — DIPHENHYDRAMINE HYDROCHLORIDE 50 MG/ML
12.5 INJECTION INTRAMUSCULAR; INTRAVENOUS ONCE
Status: COMPLETED | OUTPATIENT
Start: 2019-04-02 | End: 2019-04-02

## 2019-04-02 RX ORDER — AMOXICILLIN 250 MG
2 CAPSULE ORAL DAILY
Status: DISCONTINUED | OUTPATIENT
Start: 2019-04-02 | End: 2019-04-05 | Stop reason: HOSPADM

## 2019-04-02 RX ORDER — DIPHENOXYLATE HYDROCHLORIDE AND ATROPINE SULFATE 2.5; .025 MG/1; MG/1
1 TABLET ORAL DAILY
COMMUNITY

## 2019-04-02 RX ORDER — EPHEDRINE SULFATE 50 MG/ML
INJECTION INTRAVENOUS AS NEEDED
Status: DISCONTINUED | OUTPATIENT
Start: 2019-04-02 | End: 2019-04-02 | Stop reason: SURG

## 2019-04-02 RX ORDER — SODIUM CHLORIDE, SODIUM LACTATE, POTASSIUM CHLORIDE, CALCIUM CHLORIDE 600; 310; 30; 20 MG/100ML; MG/100ML; MG/100ML; MG/100ML
75 INJECTION, SOLUTION INTRAVENOUS CONTINUOUS
Status: DISCONTINUED | OUTPATIENT
Start: 2019-04-02 | End: 2019-04-02 | Stop reason: SDUPTHER

## 2019-04-02 RX ORDER — FENTANYL CITRATE 50 UG/ML
INJECTION, SOLUTION INTRAMUSCULAR; INTRAVENOUS AS NEEDED
Status: DISCONTINUED | OUTPATIENT
Start: 2019-04-02 | End: 2019-04-02 | Stop reason: SURG

## 2019-04-02 RX ORDER — ACETAMINOPHEN 325 MG/1
975 TABLET ORAL EVERY 8 HOURS SCHEDULED
Status: DISCONTINUED | OUTPATIENT
Start: 2019-04-02 | End: 2019-04-05 | Stop reason: HOSPADM

## 2019-04-02 RX ORDER — LIDOCAINE HYDROCHLORIDE 10 MG/ML
INJECTION, SOLUTION INFILTRATION; PERINEURAL AS NEEDED
Status: DISCONTINUED | OUTPATIENT
Start: 2019-04-02 | End: 2019-04-02 | Stop reason: SURG

## 2019-04-02 RX ORDER — METHOCARBAMOL 500 MG/1
500 TABLET, FILM COATED ORAL EVERY 6 HOURS SCHEDULED
Status: DISCONTINUED | OUTPATIENT
Start: 2019-04-02 | End: 2019-04-05 | Stop reason: HOSPADM

## 2019-04-02 RX ORDER — GABAPENTIN 300 MG/1
300 CAPSULE ORAL ONCE
Status: COMPLETED | OUTPATIENT
Start: 2019-04-02 | End: 2019-04-02

## 2019-04-02 RX ORDER — ONDANSETRON 2 MG/ML
4 INJECTION INTRAMUSCULAR; INTRAVENOUS ONCE AS NEEDED
Status: COMPLETED | OUTPATIENT
Start: 2019-04-02 | End: 2019-04-02

## 2019-04-02 RX ORDER — MAGNESIUM HYDROXIDE/ALUMINUM HYDROXICE/SIMETHICONE 120; 1200; 1200 MG/30ML; MG/30ML; MG/30ML
30 SUSPENSION ORAL EVERY 6 HOURS PRN
Status: DISCONTINUED | OUTPATIENT
Start: 2019-04-02 | End: 2019-04-02

## 2019-04-02 RX ORDER — ONDANSETRON 2 MG/ML
INJECTION INTRAMUSCULAR; INTRAVENOUS AS NEEDED
Status: DISCONTINUED | OUTPATIENT
Start: 2019-04-02 | End: 2019-04-02 | Stop reason: SURG

## 2019-04-02 RX ORDER — HYDROMORPHONE HCL/PF 1 MG/ML
0.5 SYRINGE (ML) INJECTION
Status: DISCONTINUED | OUTPATIENT
Start: 2019-04-02 | End: 2019-04-04

## 2019-04-02 RX ORDER — OXYCODONE HYDROCHLORIDE 10 MG/1
10 TABLET ORAL EVERY 4 HOURS PRN
Status: DISCONTINUED | OUTPATIENT
Start: 2019-04-02 | End: 2019-04-05 | Stop reason: HOSPADM

## 2019-04-02 RX ORDER — DEXAMETHASONE SODIUM PHOSPHATE 10 MG/ML
INJECTION, SOLUTION INTRAMUSCULAR; INTRAVENOUS AS NEEDED
Status: DISCONTINUED | OUTPATIENT
Start: 2019-04-02 | End: 2019-04-02 | Stop reason: SURG

## 2019-04-02 RX ORDER — HYDROMORPHONE HCL/PF 1 MG/ML
SYRINGE (ML) INJECTION AS NEEDED
Status: DISCONTINUED | OUTPATIENT
Start: 2019-04-02 | End: 2019-04-02 | Stop reason: SURG

## 2019-04-02 RX ADMIN — FENTANYL CITRATE 25 MCG: 50 INJECTION, SOLUTION INTRAMUSCULAR; INTRAVENOUS at 13:51

## 2019-04-02 RX ADMIN — DEXMEDETOMIDINE HYDROCHLORIDE 0.2 MCG/KG/HR: 100 INJECTION, SOLUTION INTRAVENOUS at 08:32

## 2019-04-02 RX ADMIN — SODIUM CHLORIDE: 0.9 INJECTION, SOLUTION INTRAVENOUS at 11:22

## 2019-04-02 RX ADMIN — SODIUM CHLORIDE 75 ML/HR: 0.9 INJECTION, SOLUTION INTRAVENOUS at 15:19

## 2019-04-02 RX ADMIN — HYDROMORPHONE HYDROCHLORIDE 0.25 MG: 1 INJECTION, SOLUTION INTRAMUSCULAR; INTRAVENOUS; SUBCUTANEOUS at 13:14

## 2019-04-02 RX ADMIN — SODIUM CHLORIDE, SODIUM LACTATE, POTASSIUM CHLORIDE, AND CALCIUM CHLORIDE: .6; .31; .03; .02 INJECTION, SOLUTION INTRAVENOUS at 10:01

## 2019-04-02 RX ADMIN — LIDOCAINE HYDROCHLORIDE 100 MG: 10 INJECTION, SOLUTION INFILTRATION; PERINEURAL at 07:52

## 2019-04-02 RX ADMIN — CEFAZOLIN SODIUM 1000 MG: 1 SOLUTION INTRAVENOUS at 12:19

## 2019-04-02 RX ADMIN — DEXAMETHASONE SODIUM PHOSPHATE 10 MG: 10 INJECTION, SOLUTION INTRAMUSCULAR; INTRAVENOUS at 07:55

## 2019-04-02 RX ADMIN — FENTANYL CITRATE 25 MCG: 50 INJECTION, SOLUTION INTRAMUSCULAR; INTRAVENOUS at 13:36

## 2019-04-02 RX ADMIN — EPHEDRINE SULFATE 5 MG: 50 INJECTION, SOLUTION INTRAVENOUS at 09:16

## 2019-04-02 RX ADMIN — MIDAZOLAM 2 MG: 1 INJECTION INTRAMUSCULAR; INTRAVENOUS at 07:46

## 2019-04-02 RX ADMIN — HYDROMORPHONE HYDROCHLORIDE 0.5 MG: 1 INJECTION, SOLUTION INTRAMUSCULAR; INTRAVENOUS; SUBCUTANEOUS at 13:17

## 2019-04-02 RX ADMIN — EPHEDRINE SULFATE 5 MG: 50 INJECTION, SOLUTION INTRAVENOUS at 12:54

## 2019-04-02 RX ADMIN — NEOSTIGMINE METHYLSULFATE 3 MG: 1 INJECTION, SOLUTION INTRAVENOUS at 12:38

## 2019-04-02 RX ADMIN — EPHEDRINE SULFATE 5 MG: 50 INJECTION, SOLUTION INTRAVENOUS at 11:21

## 2019-04-02 RX ADMIN — GLYCOPYRROLATE 0.1 MG: 0.2 INJECTION, SOLUTION INTRAMUSCULAR; INTRAVENOUS at 08:52

## 2019-04-02 RX ADMIN — SODIUM CHLORIDE: 0.9 INJECTION, SOLUTION INTRAVENOUS at 07:56

## 2019-04-02 RX ADMIN — OXYCODONE HYDROCHLORIDE 10 MG: 10 TABLET ORAL at 17:46

## 2019-04-02 RX ADMIN — HYDROMORPHONE HYDROCHLORIDE 0.25 MG: 1 INJECTION, SOLUTION INTRAMUSCULAR; INTRAVENOUS; SUBCUTANEOUS at 11:28

## 2019-04-02 RX ADMIN — FENTANYL CITRATE 25 MCG: 50 INJECTION, SOLUTION INTRAMUSCULAR; INTRAVENOUS at 14:47

## 2019-04-02 RX ADMIN — HYDROMORPHONE HYDROCHLORIDE 0.5 MG: 1 INJECTION, SOLUTION INTRAMUSCULAR; INTRAVENOUS; SUBCUTANEOUS at 13:24

## 2019-04-02 RX ADMIN — METHOCARBAMOL 500 MG: 500 TABLET, FILM COATED ORAL at 17:46

## 2019-04-02 RX ADMIN — FENTANYL CITRATE 25 MCG: 50 INJECTION, SOLUTION INTRAMUSCULAR; INTRAVENOUS at 14:02

## 2019-04-02 RX ADMIN — FENTANYL CITRATE 50 MCG: 50 INJECTION, SOLUTION INTRAMUSCULAR; INTRAVENOUS at 09:07

## 2019-04-02 RX ADMIN — METHOCARBAMOL 500 MG: 500 TABLET, FILM COATED ORAL at 23:04

## 2019-04-02 RX ADMIN — DIPHENHYDRAMINE HYDROCHLORIDE: 50 INJECTION, SOLUTION INTRAMUSCULAR; INTRAVENOUS at 15:17

## 2019-04-02 RX ADMIN — OXYCODONE HYDROCHLORIDE 5 MG: 5 TABLET ORAL at 21:29

## 2019-04-02 RX ADMIN — CHLORHEXIDINE GLUCONATE 0.12% ORAL RINSE 15 ML: 1.2 LIQUID ORAL at 06:12

## 2019-04-02 RX ADMIN — ONDANSETRON 4 MG: 2 INJECTION INTRAMUSCULAR; INTRAVENOUS at 12:36

## 2019-04-02 RX ADMIN — ACETAMINOPHEN 975 MG: 325 TABLET ORAL at 21:29

## 2019-04-02 RX ADMIN — CEFAZOLIN SODIUM 1000 MG: 1 SOLUTION INTRAVENOUS at 19:14

## 2019-04-02 RX ADMIN — PHENYLEPHRINE HYDROCHLORIDE 50 MCG/MIN: 10 INJECTION INTRAVENOUS at 08:45

## 2019-04-02 RX ADMIN — ONDANSETRON 4 MG: 2 INJECTION INTRAMUSCULAR; INTRAVENOUS at 13:48

## 2019-04-02 RX ADMIN — FENTANYL CITRATE 25 MCG: 50 INJECTION, SOLUTION INTRAMUSCULAR; INTRAVENOUS at 14:27

## 2019-04-02 RX ADMIN — PROPOFOL 100 MG: 10 INJECTION, EMULSION INTRAVENOUS at 07:52

## 2019-04-02 RX ADMIN — SODIUM CHLORIDE, SODIUM LACTATE, POTASSIUM CHLORIDE, AND CALCIUM CHLORIDE: .6; .31; .03; .02 INJECTION, SOLUTION INTRAVENOUS at 07:24

## 2019-04-02 RX ADMIN — GABAPENTIN 300 MG: 300 CAPSULE ORAL at 06:12

## 2019-04-02 RX ADMIN — FENTANYL CITRATE 50 MCG: 50 INJECTION, SOLUTION INTRAMUSCULAR; INTRAVENOUS at 09:00

## 2019-04-02 RX ADMIN — HYDROMORPHONE HYDROCHLORIDE 0.5 MG: 1 INJECTION, SOLUTION INTRAMUSCULAR; INTRAVENOUS; SUBCUTANEOUS at 09:23

## 2019-04-02 RX ADMIN — GLYCOPYRROLATE 0.4 MG: 0.2 INJECTION, SOLUTION INTRAMUSCULAR; INTRAVENOUS at 12:38

## 2019-04-02 RX ADMIN — CEFAZOLIN SODIUM 1000 MG: 1 SOLUTION INTRAVENOUS at 08:25

## 2019-04-02 RX ADMIN — ROCURONIUM BROMIDE 20 MG: 10 INJECTION, SOLUTION INTRAVENOUS at 07:52

## 2019-04-03 ENCOUNTER — APPOINTMENT (INPATIENT)
Dept: RADIOLOGY | Facility: HOSPITAL | Age: 64
DRG: 460 | End: 2019-04-03
Payer: COMMERCIAL

## 2019-04-03 LAB
ANION GAP SERPL CALCULATED.3IONS-SCNC: 3 MMOL/L (ref 4–13)
BACTERIA UR CULT: NORMAL
BUN SERPL-MCNC: 12 MG/DL (ref 5–25)
CALCIUM SERPL-MCNC: 7.4 MG/DL (ref 8.3–10.1)
CHLORIDE SERPL-SCNC: 109 MMOL/L (ref 100–108)
CO2 SERPL-SCNC: 26 MMOL/L (ref 21–32)
CREAT SERPL-MCNC: 0.93 MG/DL (ref 0.6–1.3)
ERYTHROCYTE [DISTWIDTH] IN BLOOD BY AUTOMATED COUNT: 13.1 % (ref 11.6–15.1)
GFR SERPL CREATININE-BSD FRML MDRD: 66 ML/MIN/1.73SQ M
GLUCOSE SERPL-MCNC: 108 MG/DL (ref 65–140)
HCT VFR BLD AUTO: 28.1 % (ref 34.8–46.1)
HGB BLD-MCNC: 9.1 G/DL (ref 11.5–15.4)
MCH RBC QN AUTO: 30.5 PG (ref 26.8–34.3)
MCHC RBC AUTO-ENTMCNC: 32.4 G/DL (ref 31.4–37.4)
MCV RBC AUTO: 94 FL (ref 82–98)
PLATELET # BLD AUTO: 125 THOUSANDS/UL (ref 149–390)
PMV BLD AUTO: 10.4 FL (ref 8.9–12.7)
POTASSIUM SERPL-SCNC: 4 MMOL/L (ref 3.5–5.3)
RBC # BLD AUTO: 2.98 MILLION/UL (ref 3.81–5.12)
SODIUM SERPL-SCNC: 138 MMOL/L (ref 136–145)
WBC # BLD AUTO: 7.92 THOUSAND/UL (ref 4.31–10.16)

## 2019-04-03 PROCEDURE — 85027 COMPLETE CBC AUTOMATED: CPT | Performed by: NEUROLOGICAL SURGERY

## 2019-04-03 PROCEDURE — 97167 OT EVAL HIGH COMPLEX 60 MIN: CPT

## 2019-04-03 PROCEDURE — G8987 SELF CARE CURRENT STATUS: HCPCS

## 2019-04-03 PROCEDURE — 97163 PT EVAL HIGH COMPLEX 45 MIN: CPT

## 2019-04-03 PROCEDURE — G8978 MOBILITY CURRENT STATUS: HCPCS

## 2019-04-03 PROCEDURE — 72100 X-RAY EXAM L-S SPINE 2/3 VWS: CPT

## 2019-04-03 PROCEDURE — 80048 BASIC METABOLIC PNL TOTAL CA: CPT | Performed by: NEUROLOGICAL SURGERY

## 2019-04-03 PROCEDURE — G8979 MOBILITY GOAL STATUS: HCPCS

## 2019-04-03 PROCEDURE — 99232 SBSQ HOSP IP/OBS MODERATE 35: CPT | Performed by: INTERNAL MEDICINE

## 2019-04-03 PROCEDURE — G8988 SELF CARE GOAL STATUS: HCPCS

## 2019-04-03 PROCEDURE — 99024 POSTOP FOLLOW-UP VISIT: CPT | Performed by: NEUROLOGICAL SURGERY

## 2019-04-03 RX ADMIN — ENOXAPARIN SODIUM 40 MG: 40 INJECTION SUBCUTANEOUS at 09:54

## 2019-04-03 RX ADMIN — METHOCARBAMOL 500 MG: 500 TABLET, FILM COATED ORAL at 11:44

## 2019-04-03 RX ADMIN — OXYCODONE HYDROCHLORIDE 10 MG: 10 TABLET ORAL at 02:30

## 2019-04-03 RX ADMIN — METHOCARBAMOL 500 MG: 500 TABLET, FILM COATED ORAL at 17:33

## 2019-04-03 RX ADMIN — CEFAZOLIN SODIUM 1000 MG: 1 SOLUTION INTRAVENOUS at 04:05

## 2019-04-03 RX ADMIN — HYDROMORPHONE HYDROCHLORIDE 0.5 MG: 1 INJECTION, SOLUTION INTRAMUSCULAR; INTRAVENOUS; SUBCUTANEOUS at 18:15

## 2019-04-03 RX ADMIN — SODIUM CHLORIDE 75 ML/HR: 0.9 INJECTION, SOLUTION INTRAVENOUS at 02:27

## 2019-04-03 RX ADMIN — ACETAMINOPHEN 975 MG: 325 TABLET ORAL at 06:41

## 2019-04-03 RX ADMIN — ACETAMINOPHEN 975 MG: 325 TABLET ORAL at 14:31

## 2019-04-03 RX ADMIN — METHOCARBAMOL 500 MG: 500 TABLET, FILM COATED ORAL at 06:41

## 2019-04-03 RX ADMIN — SODIUM CHLORIDE 500 ML: 0.9 INJECTION, SOLUTION INTRAVENOUS at 04:08

## 2019-04-03 RX ADMIN — METHOCARBAMOL 500 MG: 500 TABLET, FILM COATED ORAL at 23:56

## 2019-04-03 RX ADMIN — OXYCODONE HYDROCHLORIDE 10 MG: 10 TABLET ORAL at 22:04

## 2019-04-03 RX ADMIN — SODIUM CHLORIDE 500 ML: 0.9 INJECTION, SOLUTION INTRAVENOUS at 07:38

## 2019-04-03 RX ADMIN — HYDROMORPHONE HYDROCHLORIDE 0.5 MG: 1 INJECTION, SOLUTION INTRAMUSCULAR; INTRAVENOUS; SUBCUTANEOUS at 20:16

## 2019-04-03 RX ADMIN — ACETAMINOPHEN 975 MG: 325 TABLET ORAL at 22:04

## 2019-04-03 RX ADMIN — OXYCODONE HYDROCHLORIDE 10 MG: 10 TABLET ORAL at 11:44

## 2019-04-03 RX ADMIN — SODIUM CHLORIDE 100 ML/HR: 0.9 INJECTION, SOLUTION INTRAVENOUS at 16:24

## 2019-04-03 RX ADMIN — CALCIUM GLUCONATE 1 G: 98 INJECTION, SOLUTION INTRAVENOUS at 09:54

## 2019-04-03 RX ADMIN — OXYCODONE HYDROCHLORIDE 10 MG: 10 TABLET ORAL at 16:18

## 2019-04-04 LAB
ANION GAP SERPL CALCULATED.3IONS-SCNC: 4 MMOL/L (ref 4–13)
BASOPHILS # BLD AUTO: 0.03 THOUSANDS/ΜL (ref 0–0.1)
BASOPHILS NFR BLD AUTO: 0 % (ref 0–1)
BUN SERPL-MCNC: 9 MG/DL (ref 5–25)
CALCIUM SERPL-MCNC: 7.4 MG/DL (ref 8.3–10.1)
CHLORIDE SERPL-SCNC: 109 MMOL/L (ref 100–108)
CO2 SERPL-SCNC: 26 MMOL/L (ref 21–32)
CREAT SERPL-MCNC: 0.88 MG/DL (ref 0.6–1.3)
EOSINOPHIL # BLD AUTO: 0.04 THOUSAND/ΜL (ref 0–0.61)
EOSINOPHIL NFR BLD AUTO: 1 % (ref 0–6)
ERYTHROCYTE [DISTWIDTH] IN BLOOD BY AUTOMATED COUNT: 13.4 % (ref 11.6–15.1)
GFR SERPL CREATININE-BSD FRML MDRD: 71 ML/MIN/1.73SQ M
GLUCOSE SERPL-MCNC: 85 MG/DL (ref 65–140)
HCT VFR BLD AUTO: 27.3 % (ref 34.8–46.1)
HGB BLD-MCNC: 8.7 G/DL (ref 11.5–15.4)
IMM GRANULOCYTES # BLD AUTO: 0.02 THOUSAND/UL (ref 0–0.2)
IMM GRANULOCYTES NFR BLD AUTO: 0 % (ref 0–2)
LYMPHOCYTES # BLD AUTO: 2.1 THOUSANDS/ΜL (ref 0.6–4.47)
LYMPHOCYTES NFR BLD AUTO: 28 % (ref 14–44)
MCH RBC QN AUTO: 30.5 PG (ref 26.8–34.3)
MCHC RBC AUTO-ENTMCNC: 31.9 G/DL (ref 31.4–37.4)
MCV RBC AUTO: 96 FL (ref 82–98)
MONOCYTES # BLD AUTO: 0.66 THOUSAND/ΜL (ref 0.17–1.22)
MONOCYTES NFR BLD AUTO: 9 % (ref 4–12)
NEUTROPHILS # BLD AUTO: 4.73 THOUSANDS/ΜL (ref 1.85–7.62)
NEUTS SEG NFR BLD AUTO: 62 % (ref 43–75)
NRBC BLD AUTO-RTO: 0 /100 WBCS
PLATELET # BLD AUTO: 127 THOUSANDS/UL (ref 149–390)
PMV BLD AUTO: 11 FL (ref 8.9–12.7)
POTASSIUM SERPL-SCNC: 3.8 MMOL/L (ref 3.5–5.3)
RBC # BLD AUTO: 2.85 MILLION/UL (ref 3.81–5.12)
SODIUM SERPL-SCNC: 139 MMOL/L (ref 136–145)
WBC # BLD AUTO: 7.58 THOUSAND/UL (ref 4.31–10.16)

## 2019-04-04 PROCEDURE — 97535 SELF CARE MNGMENT TRAINING: CPT

## 2019-04-04 PROCEDURE — 85025 COMPLETE CBC W/AUTO DIFF WBC: CPT | Performed by: NEUROLOGICAL SURGERY

## 2019-04-04 PROCEDURE — 80048 BASIC METABOLIC PNL TOTAL CA: CPT | Performed by: PHYSICIAN ASSISTANT

## 2019-04-04 PROCEDURE — 99024 POSTOP FOLLOW-UP VISIT: CPT | Performed by: PHYSICIAN ASSISTANT

## 2019-04-04 PROCEDURE — 97116 GAIT TRAINING THERAPY: CPT

## 2019-04-04 RX ADMIN — SENNOSIDES AND DOCUSATE SODIUM 2 TABLET: 8.6; 5 TABLET ORAL at 09:35

## 2019-04-04 RX ADMIN — METHOCARBAMOL 500 MG: 500 TABLET, FILM COATED ORAL at 23:25

## 2019-04-04 RX ADMIN — ACETAMINOPHEN 975 MG: 325 TABLET ORAL at 23:25

## 2019-04-04 RX ADMIN — ACETAMINOPHEN 975 MG: 325 TABLET ORAL at 05:45

## 2019-04-04 RX ADMIN — ACETAMINOPHEN 975 MG: 325 TABLET ORAL at 13:18

## 2019-04-04 RX ADMIN — OXYCODONE HYDROCHLORIDE 10 MG: 10 TABLET ORAL at 09:35

## 2019-04-04 RX ADMIN — HYDROMORPHONE HYDROCHLORIDE 0.5 MG: 1 INJECTION, SOLUTION INTRAMUSCULAR; INTRAVENOUS; SUBCUTANEOUS at 10:53

## 2019-04-04 RX ADMIN — ONDANSETRON 4 MG: 2 INJECTION INTRAMUSCULAR; INTRAVENOUS at 10:28

## 2019-04-04 RX ADMIN — METHOCARBAMOL 500 MG: 500 TABLET, FILM COATED ORAL at 05:45

## 2019-04-04 RX ADMIN — ENOXAPARIN SODIUM 40 MG: 40 INJECTION SUBCUTANEOUS at 09:35

## 2019-04-04 RX ADMIN — METHOCARBAMOL 500 MG: 500 TABLET, FILM COATED ORAL at 20:20

## 2019-04-04 RX ADMIN — METHOCARBAMOL 500 MG: 500 TABLET, FILM COATED ORAL at 13:17

## 2019-04-04 RX ADMIN — OXYCODONE HYDROCHLORIDE 5 MG: 5 TABLET ORAL at 02:24

## 2019-04-04 RX ADMIN — SODIUM CHLORIDE 100 ML/HR: 0.9 INJECTION, SOLUTION INTRAVENOUS at 02:17

## 2019-04-05 VITALS
SYSTOLIC BLOOD PRESSURE: 115 MMHG | DIASTOLIC BLOOD PRESSURE: 69 MMHG | HEIGHT: 63 IN | WEIGHT: 130.29 LBS | TEMPERATURE: 99 F | OXYGEN SATURATION: 95 % | RESPIRATION RATE: 16 BRPM | BODY MASS INDEX: 23.09 KG/M2 | HEART RATE: 58 BPM

## 2019-04-05 LAB
ALBUMIN SERPL BCP-MCNC: 2.6 G/DL (ref 3.5–5)
ALP SERPL-CCNC: 52 U/L (ref 46–116)
ALT SERPL W P-5'-P-CCNC: 21 U/L (ref 12–78)
ANION GAP SERPL CALCULATED.3IONS-SCNC: 8 MMOL/L (ref 4–13)
AST SERPL W P-5'-P-CCNC: 43 U/L (ref 5–45)
BILIRUB SERPL-MCNC: 0.8 MG/DL (ref 0.2–1)
BUN SERPL-MCNC: 9 MG/DL (ref 5–25)
CALCIUM SERPL-MCNC: 7.9 MG/DL (ref 8.3–10.1)
CHLORIDE SERPL-SCNC: 106 MMOL/L (ref 100–108)
CO2 SERPL-SCNC: 22 MMOL/L (ref 21–32)
CREAT SERPL-MCNC: 0.78 MG/DL (ref 0.6–1.3)
ERYTHROCYTE [DISTWIDTH] IN BLOOD BY AUTOMATED COUNT: 13.1 % (ref 11.6–15.1)
GFR SERPL CREATININE-BSD FRML MDRD: 82 ML/MIN/1.73SQ M
GLUCOSE SERPL-MCNC: 64 MG/DL (ref 65–140)
HCT VFR BLD AUTO: 30.9 % (ref 34.8–46.1)
HGB BLD-MCNC: 9.9 G/DL (ref 11.5–15.4)
MCH RBC QN AUTO: 30.7 PG (ref 26.8–34.3)
MCHC RBC AUTO-ENTMCNC: 32 G/DL (ref 31.4–37.4)
MCV RBC AUTO: 96 FL (ref 82–98)
PLATELET # BLD AUTO: 160 THOUSANDS/UL (ref 149–390)
PMV BLD AUTO: 11.5 FL (ref 8.9–12.7)
POTASSIUM SERPL-SCNC: 3.8 MMOL/L (ref 3.5–5.3)
PROT SERPL-MCNC: 6 G/DL (ref 6.4–8.2)
RBC # BLD AUTO: 3.23 MILLION/UL (ref 3.81–5.12)
SODIUM SERPL-SCNC: 136 MMOL/L (ref 136–145)
WBC # BLD AUTO: 8.16 THOUSAND/UL (ref 4.31–10.16)

## 2019-04-05 PROCEDURE — NC001 PR NO CHARGE: Performed by: NEUROLOGICAL SURGERY

## 2019-04-05 PROCEDURE — 80053 COMPREHEN METABOLIC PANEL: CPT | Performed by: PHYSICIAN ASSISTANT

## 2019-04-05 PROCEDURE — 97116 GAIT TRAINING THERAPY: CPT

## 2019-04-05 PROCEDURE — 99024 POSTOP FOLLOW-UP VISIT: CPT | Performed by: PHYSICIAN ASSISTANT

## 2019-04-05 PROCEDURE — 85027 COMPLETE CBC AUTOMATED: CPT | Performed by: PHYSICIAN ASSISTANT

## 2019-04-05 RX ORDER — MAGNESIUM CARB/ALUMINUM HYDROX 105-160MG
296 TABLET,CHEWABLE ORAL ONCE
Status: CANCELLED | OUTPATIENT
Start: 2019-04-05 | End: 2019-04-05

## 2019-04-05 RX ORDER — OXYCODONE HYDROCHLORIDE 5 MG/1
5 TABLET ORAL EVERY 4 HOURS PRN
Qty: 30 TABLET | Refills: 0 | Status: SHIPPED | OUTPATIENT
Start: 2019-04-05 | End: 2019-04-15

## 2019-04-05 RX ORDER — METHOCARBAMOL 500 MG/1
500 TABLET, FILM COATED ORAL EVERY 6 HOURS PRN
Qty: 40 TABLET | Refills: 0 | Status: SHIPPED | OUTPATIENT
Start: 2019-04-05 | End: 2019-10-21 | Stop reason: ALTCHOICE

## 2019-04-05 RX ORDER — AMOXICILLIN 250 MG
2 CAPSULE ORAL 2 TIMES DAILY PRN
Qty: 60 TABLET | Refills: 0 | Status: SHIPPED | OUTPATIENT
Start: 2019-04-05 | End: 2019-04-10 | Stop reason: ALTCHOICE

## 2019-04-05 RX ORDER — BISACODYL 10 MG
10 SUPPOSITORY, RECTAL RECTAL ONCE
Status: COMPLETED | OUTPATIENT
Start: 2019-04-05 | End: 2019-04-05

## 2019-04-05 RX ADMIN — ENOXAPARIN SODIUM 40 MG: 40 INJECTION SUBCUTANEOUS at 09:25

## 2019-04-05 RX ADMIN — SENNOSIDES AND DOCUSATE SODIUM 2 TABLET: 8.6; 5 TABLET ORAL at 09:25

## 2019-04-05 RX ADMIN — BISACODYL 10 MG: 10 SUPPOSITORY RECTAL at 13:33

## 2019-04-05 RX ADMIN — OXYCODONE HYDROCHLORIDE 5 MG: 5 TABLET ORAL at 10:55

## 2019-04-05 RX ADMIN — ACETAMINOPHEN 975 MG: 325 TABLET ORAL at 13:33

## 2019-04-05 RX ADMIN — METHOCARBAMOL 500 MG: 500 TABLET, FILM COATED ORAL at 05:51

## 2019-04-05 RX ADMIN — METHOCARBAMOL 500 MG: 500 TABLET, FILM COATED ORAL at 11:46

## 2019-04-05 RX ADMIN — ACETAMINOPHEN 975 MG: 325 TABLET ORAL at 05:51

## 2019-04-08 ENCOUNTER — TELEPHONE (OUTPATIENT)
Dept: NEUROSURGERY | Facility: CLINIC | Age: 64
End: 2019-04-08

## 2019-04-08 ENCOUNTER — TRANSITIONAL CARE MANAGEMENT (OUTPATIENT)
Dept: FAMILY MEDICINE CLINIC | Facility: CLINIC | Age: 64
End: 2019-04-08

## 2019-04-09 ENCOUNTER — TELEPHONE (OUTPATIENT)
Dept: FAMILY MEDICINE CLINIC | Facility: CLINIC | Age: 64
End: 2019-04-09

## 2019-04-10 ENCOUNTER — OFFICE VISIT (OUTPATIENT)
Dept: FAMILY MEDICINE CLINIC | Facility: CLINIC | Age: 64
End: 2019-04-10
Payer: COMMERCIAL

## 2019-04-10 VITALS
TEMPERATURE: 99.1 F | WEIGHT: 118.25 LBS | HEART RATE: 64 BPM | SYSTOLIC BLOOD PRESSURE: 130 MMHG | HEIGHT: 63 IN | BODY MASS INDEX: 20.95 KG/M2 | DIASTOLIC BLOOD PRESSURE: 70 MMHG

## 2019-04-10 DIAGNOSIS — M53.3 SI (SACROILIAC) JOINT DYSFUNCTION: ICD-10-CM

## 2019-04-10 DIAGNOSIS — E78.00 PURE HYPERCHOLESTEROLEMIA: ICD-10-CM

## 2019-04-10 DIAGNOSIS — L20.9 ATOPIC DERMATITIS, UNSPECIFIED TYPE: ICD-10-CM

## 2019-04-10 DIAGNOSIS — M54.16 LUMBAR RADICULOPATHY: Primary | ICD-10-CM

## 2019-04-10 PROCEDURE — 1111F DSCHRG MED/CURRENT MED MERGE: CPT | Performed by: FAMILY MEDICINE

## 2019-04-10 PROCEDURE — 99495 TRANSJ CARE MGMT MOD F2F 14D: CPT | Performed by: FAMILY MEDICINE

## 2019-04-16 ENCOUNTER — CLINICAL SUPPORT (OUTPATIENT)
Dept: NEUROSURGERY | Facility: CLINIC | Age: 64
End: 2019-04-16

## 2019-04-16 VITALS
HEIGHT: 62 IN | SYSTOLIC BLOOD PRESSURE: 118 MMHG | DIASTOLIC BLOOD PRESSURE: 72 MMHG | WEIGHT: 117 LBS | BODY MASS INDEX: 21.53 KG/M2

## 2019-04-16 DIAGNOSIS — Z98.890 POST-OPERATIVE STATE: Primary | ICD-10-CM

## 2019-04-16 PROCEDURE — 99024 POSTOP FOLLOW-UP VISIT: CPT

## 2019-04-18 ENCOUNTER — TELEPHONE (OUTPATIENT)
Dept: NEUROSURGERY | Facility: CLINIC | Age: 64
End: 2019-04-18

## 2019-05-09 ENCOUNTER — TELEPHONE (OUTPATIENT)
Dept: NEUROSURGERY | Facility: CLINIC | Age: 64
End: 2019-05-09

## 2019-05-10 ENCOUNTER — HOSPITAL ENCOUNTER (OUTPATIENT)
Dept: RADIOLOGY | Facility: HOSPITAL | Age: 64
Discharge: HOME | End: 2019-05-10
Payer: COMMERCIAL

## 2019-05-10 DIAGNOSIS — Z98.1 STATUS POST LUMBAR SPINAL FUSION: ICD-10-CM

## 2019-05-10 PROCEDURE — 72100 X-RAY EXAM L-S SPINE 2/3 VWS: CPT

## 2019-05-13 ENCOUNTER — OFFICE VISIT (OUTPATIENT)
Dept: NEUROSURGERY | Facility: CLINIC | Age: 64
End: 2019-05-13

## 2019-05-13 VITALS
TEMPERATURE: 97.8 F | BODY MASS INDEX: 21.3 KG/M2 | HEART RATE: 57 BPM | DIASTOLIC BLOOD PRESSURE: 89 MMHG | HEIGHT: 63 IN | SYSTOLIC BLOOD PRESSURE: 138 MMHG | RESPIRATION RATE: 16 BRPM | WEIGHT: 120.2 LBS

## 2019-05-13 DIAGNOSIS — M48.062 SPINAL STENOSIS OF LUMBAR REGION WITH NEUROGENIC CLAUDICATION: Primary | ICD-10-CM

## 2019-05-13 DIAGNOSIS — M54.16 LUMBAR RADICULOPATHY: ICD-10-CM

## 2019-05-13 DIAGNOSIS — M43.16 SPONDYLOLISTHESIS OF LUMBAR REGION: ICD-10-CM

## 2019-05-13 DIAGNOSIS — M71.38 SYNOVIAL CYST OF LUMBAR FACET JOINT: ICD-10-CM

## 2019-05-13 PROCEDURE — 99024 POSTOP FOLLOW-UP VISIT: CPT | Performed by: NEUROLOGICAL SURGERY

## 2019-05-13 RX ORDER — ASPIRIN 325 MG
325 TABLET ORAL DAILY
COMMUNITY

## 2019-10-01 ENCOUNTER — TELEPHONE (OUTPATIENT)
Dept: NEUROSURGERY | Facility: CLINIC | Age: 64
End: 2019-10-01

## 2019-10-01 NOTE — TELEPHONE ENCOUNTER
Patient called to confirm how she needs x-rays prior to her appointment with Dr Bello Ghosh on 10/21/19  Informed patient she does need x-rays prior to that appointment  Informed patient the script is in the system  She was very appreciative

## 2019-10-05 ENCOUNTER — HOSPITAL ENCOUNTER (OUTPATIENT)
Dept: RADIOLOGY | Facility: HOSPITAL | Age: 64
Discharge: HOME | End: 2019-10-05
Attending: NEUROLOGICAL SURGERY
Payer: COMMERCIAL

## 2019-10-05 DIAGNOSIS — M54.16 LUMBAR RADICULOPATHY: ICD-10-CM

## 2019-10-05 DIAGNOSIS — M43.16 SPONDYLOLISTHESIS OF LUMBAR REGION: ICD-10-CM

## 2019-10-05 DIAGNOSIS — M48.062 SPINAL STENOSIS OF LUMBAR REGION WITH NEUROGENIC CLAUDICATION: ICD-10-CM

## 2019-10-05 DIAGNOSIS — M71.38 SYNOVIAL CYST OF LUMBAR FACET JOINT: ICD-10-CM

## 2019-10-05 PROCEDURE — 72100 X-RAY EXAM L-S SPINE 2/3 VWS: CPT

## 2019-10-21 ENCOUNTER — OFFICE VISIT (OUTPATIENT)
Dept: NEUROSURGERY | Facility: CLINIC | Age: 64
End: 2019-10-21
Payer: COMMERCIAL

## 2019-10-21 VITALS
TEMPERATURE: 98.2 F | HEIGHT: 63 IN | WEIGHT: 119.4 LBS | BODY MASS INDEX: 21.16 KG/M2 | HEART RATE: 50 BPM | DIASTOLIC BLOOD PRESSURE: 85 MMHG | SYSTOLIC BLOOD PRESSURE: 133 MMHG

## 2019-10-21 DIAGNOSIS — M48.062 SPINAL STENOSIS OF LUMBAR REGION WITH NEUROGENIC CLAUDICATION: Primary | ICD-10-CM

## 2019-10-21 DIAGNOSIS — M43.16 SPONDYLOLISTHESIS OF LUMBAR REGION: ICD-10-CM

## 2019-10-21 PROCEDURE — 99213 OFFICE O/P EST LOW 20 MIN: CPT | Performed by: PHYSICIAN ASSISTANT

## 2019-10-21 NOTE — PROGRESS NOTES
Neurosurgery Office Note  Barbara Key 60 y o  female MRN: [id number]      Assessment/Plan     Spinal stenosis of lumbar region with neurogenic claudication  S/p MIS navigated L3-4 TLIF 4/2/19 by Dr Cristina Hill op symptoms of back pain with right sided radiculopathy completely resolved  Patient with mild achy low back pain, improved with swimming 3x week    Imaging:  Xray lumbar spine, 10/5/19: Stable postoperative change at L3-4  Stable alignment  Plan:  Return in 6 months with repeat lumbar xrays       Diagnoses and all orders for this visit:    Spinal stenosis of lumbar region with neurogenic claudication  -     X-ray lumbar spine 2 or 3 views; Future    Spondylolisthesis of lumbar region  -     X-ray lumbar spine 2 or 3 views; Future            CHIEF COMPLAINT    Chief Complaint   Patient presents with    Follow-up     Spinal Stenosis of lumbar region with neurogenic claudication        HISTORY    This is a 61 yo female who is s/p MIS navigated L3-4 TLIF on 4/2/19 by Dr Edwin Chase for RLE radiculopathy and back pain  She did very well post operatively with complete resolution of her RLE radiculopathy  She states she does have occasional low back pain, achy and 3/10, which is relieved with swimming 3x/week  She is now back to her normal activities  She denies BLE pain, weakness, numbness, tingling, gait dysfunction, or bowel/bladder incontinence      REVIEW OF SYSTEMS    Review of Systems   Musculoskeletal: Positive for back pain (aching low back )  Hematological: Bruises/bleeds easily (aspirin 325)  All other systems reviewed and are negative          Meds/Allergies     Current Outpatient Medications   Medication Sig Dispense Refill    Acetaminophen (TYLENOL 8 HOUR PO) Take by mouth      aspirin 325 mg tablet Take 325 mg by mouth daily      estradiol (ESTRACE VAGINAL) 0 1 mg/g vaginal cream Insert 1 g into the vagina once a week 42 5 g 3    hydrocortisone 2 5 % ointment Apply topically 2 (two) times a day (Patient not taking: Reported on 2019) 30 g 3    methocarbamol (ROBAXIN) 500 mg tablet Take 1 tablet (500 mg total) by mouth every 6 (six) hours as needed for muscle spasms (Patient not taking: Reported on 2019) 40 tablet 0    multivitamin (THERAGRAN) TABS Take 1 tablet by mouth daily      Psyllium (METAMUCIL FIBER PO) Take by mouth daily       No current facility-administered medications for this visit  Allergies   Allergen Reactions    Paxil [Paroxetine] Rash       PAST HISTORY    Past Medical History:   Diagnosis Date    Cervical ca ([facility] [address] )     Depression     Depression     Depression     Hyperlipidemia     Hyperlipidemia     Iron deficiency anemia     Low back pain     Meralgia paresthetica     Sciatica     right side    Stroke [facility])     embolic       Past Surgical History:   Procedure Laterality Date     SECTION      x2   ,     COLONOSCOPY      HYSTEROSCOPY      ME ARTHDSIS POST/POSTEROLATRL/POSTINTERBODY LUMBAR N/A 2019    Procedure: MIS Navigated L3-4 laminectomy/decompression (right approach) with transforaminal lumbar interbody fusion;  Surgeon: Thom Barragan MD;  Location:  MAIN OR;  Service: Neurosurgery    ME COLONOSCOPY FLX DX W/COLLJ [facility] WHEN PFRMD N/A 2017    Procedure: COLONOSCOPY;  Surgeon: Deana Fox DO;  Location: [facility] GI LAB; Service: Gastroenterology    TUBAL LIGATION         Social History     Tobacco Use    Smoking status: Never Smoker    Smokeless tobacco: Never Used   Substance Use Topics    Alcohol use: Yes     Binge frequency: Never     Comment: socially    Drug use: Never       Family History   Problem Relation Age of Onset    Hyperlipidemia Mother     Heart disease Mother 64        alive in [de-identified]    Stroke Mother     Hypertension Family     Stroke Family     Depression Sister     Hypertension Sister     Cancer Maternal Grandfather          Above history personally reviewed  EXAM    Vitals:Blood pressure 133/85, pulse (!) 50, temperature 98 2 °F (36 8 °C), temperature source Temporal, height 5' 2 5" (1 588 m), weight 54 2 kg (119 lb 6 4 oz)  ,There is no height or weight on file to calculate BMI  Physical Exam   Constitutional: She is oriented to person, place, and time  She appears well-developed and well-nourished  HENT:   Head: Normocephalic and atraumatic  Eyes: Pupils are equal, round, and reactive to light  EOM are normal    Neck: Normal range of motion  Cardiovascular: Normal rate  Pulmonary/Chest: Effort normal  No respiratory distress  Abdominal: Soft  Musculoskeletal: Normal range of motion  Neurological: She is alert and oriented to person, place, and time  She has normal strength  Gait normal    Reflex Scores:       Tricep reflexes are 2+ on the right side and 2+ on the left side  Bicep reflexes are 2+ on the right side and 2+ on the left side  Brachioradialis reflexes are 2+ on the right side and 2+ on the left side  Patellar reflexes are 2+ on the right side and 2+ on the left side  Achilles reflexes are 2+ on the right side and 2+ on the left side  Skin: Skin is warm and dry  Psychiatric: She has a normal mood and affect  Her speech is normal and behavior is normal  Judgment and thought content normal    Nursing note and vitals reviewed  Neurologic Exam     Mental Status   Oriented to person, place, and time  Follows 2 step commands  Attention: normal  Concentration: normal    Speech: speech is normal   Level of consciousness: alert  Knowledge: good  Able to repeat  Normal comprehension  Cranial Nerves   Cranial nerves II through XII intact  CN III, IV, VI   Pupils are equal, round, and reactive to light  Extraocular motions are normal      Motor Exam   Muscle bulk: normal  Overall muscle tone: normal    Strength   Strength 5/5 throughout       Sensory Exam   Light touch normal    Pinprick normal    + JPS and DST bilaterally     Gait, Coordination, and Reflexes     Gait  Gait: normal    Tremor   Resting tremor: absent    Reflexes   Right brachioradialis: 2+  Left brachioradialis: 2+  Right biceps: 2+  Left biceps: 2+  Right triceps: 2+  Left triceps: 2+  Right patellar: 2+  Left patellar: 2+  Right achilles: 2+  Left achilles: 2+  Right : 2+  Left : 2+  Right ankle clonus: absent  Left ankle clonus: absent        MEDICAL DECISION MAKING    Imaging Studies:     X-ray Lumbar Spine 2 Or 3 Views  Result Date: 10/10/2019  Impression: Stable postoperative change at L3-4  Stable alignment  Workstation performed: [id number]     I have personally reviewed pertinent reports     and I have personally reviewed pertinent films in PACS

## 2019-10-21 NOTE — ASSESSMENT & PLAN NOTE
S/p MIS navigated L3-4 TLIF 4/2/19 by Dr Cristina Hill op symptoms of back pain with right sided radiculopathy completely resolved  Patient with mild achy low back pain, improved with swimming 3x week    Imaging:  Xray lumbar spine, 10/5/19: Stable postoperative change at L3-4  Stable alignment      Plan:  Return in 6 months with repeat lumbar xrays

## 2019-10-28 ENCOUNTER — OFFICE VISIT (OUTPATIENT)
Dept: FAMILY MEDICINE CLINIC | Facility: CLINIC | Age: 64
End: 2019-10-28
Payer: COMMERCIAL

## 2019-10-28 VITALS
DIASTOLIC BLOOD PRESSURE: 60 MMHG | HEIGHT: 63 IN | BODY MASS INDEX: 21.09 KG/M2 | WEIGHT: 119 LBS | OXYGEN SATURATION: 98 % | RESPIRATION RATE: 18 BRPM | SYSTOLIC BLOOD PRESSURE: 98 MMHG | HEART RATE: 56 BPM

## 2019-10-28 DIAGNOSIS — Z00.00 ANNUAL PHYSICAL EXAM: Primary | ICD-10-CM

## 2019-10-28 DIAGNOSIS — M48.062 SPINAL STENOSIS OF LUMBAR REGION WITH NEUROGENIC CLAUDICATION: ICD-10-CM

## 2019-10-28 DIAGNOSIS — I63.9 CEREBROVASCULAR ACCIDENT (CVA), UNSPECIFIED MECHANISM (HCC): ICD-10-CM

## 2019-10-28 DIAGNOSIS — E78.00 PURE HYPERCHOLESTEROLEMIA: ICD-10-CM

## 2019-10-28 DIAGNOSIS — G89.29 CHRONIC BILATERAL LOW BACK PAIN WITH BILATERAL SCIATICA: ICD-10-CM

## 2019-10-28 DIAGNOSIS — Z23 FLU VACCINE NEED: ICD-10-CM

## 2019-10-28 DIAGNOSIS — D50.9 IRON DEFICIENCY ANEMIA, UNSPECIFIED IRON DEFICIENCY ANEMIA TYPE: ICD-10-CM

## 2019-10-28 DIAGNOSIS — M54.41 CHRONIC BILATERAL LOW BACK PAIN WITH BILATERAL SCIATICA: ICD-10-CM

## 2019-10-28 DIAGNOSIS — M54.42 CHRONIC BILATERAL LOW BACK PAIN WITH BILATERAL SCIATICA: ICD-10-CM

## 2019-10-28 DIAGNOSIS — M54.16 LUMBAR RADICULOPATHY: ICD-10-CM

## 2019-10-28 DIAGNOSIS — M43.16 SPONDYLOLISTHESIS OF LUMBAR REGION: ICD-10-CM

## 2019-10-28 PROCEDURE — 90682 RIV4 VACC RECOMBINANT DNA IM: CPT

## 2019-10-28 PROCEDURE — 90471 IMMUNIZATION ADMIN: CPT

## 2019-10-28 PROCEDURE — 99396 PREV VISIT EST AGE 40-64: CPT | Performed by: FAMILY MEDICINE

## 2019-10-28 NOTE — PATIENT INSTRUCTIONS
Wellness Visit for Adults   AMBULATORY CARE:   A wellness visit  is when you see your healthcare provider to get screened for health problems  You can also get advice on how to stay healthy  Write down your questions so you remember to ask them  Ask your healthcare provider how often you should have a wellness visit  What happens at a wellness visit:  Your healthcare provider will ask about your health, and your family history of health problems  This includes high blood pressure, heart disease, and cancer  He or she will ask if you have symptoms that concern you, if you smoke, and about your mood  You may also be asked about your intake of medicines, supplements, food, and alcohol  Any of the following may be done:  · Your weight  will be checked  Your height may also be checked so your body mass index (BMI) can be calculated  Your BMI shows if you are at a healthy weight  · Your blood pressure  and heart rate will be checked  Your temperature may also be checked  · Blood and urine tests  may be done  Blood tests may be done to check your cholesterol levels  Abnormal cholesterol levels increase your risk for heart disease and stroke  You may also need a blood or urine test to check for diabetes if you are at increased risk  Urine tests may be done to look for signs of an infection or kidney disease  · A physical exam  includes checking your heartbeat and lungs with a stethoscope  Your healthcare provider may also check your skin to look for sun damage  · Screening tests  may be recommended  A screening test is done to check for diseases that may not cause symptoms  The screening tests you may need depend on your age, gender, family history, and lifestyle habits  For example, colorectal screening may be recommended if you are 48years old or older  Screening tests you need if you are a woman:   · A Pap smear  is used to screen for cervical cancer   Pap smears are usually done every 3 to 5 years depending on your age  You may need them more often if you have had abnormal Pap smear test results in the past  Ask your healthcare provider how often you should have a Pap smear  · A mammogram  is an x-ray of your breasts to screen for breast cancer  Experts recommend mammograms every 2 years starting at age 48 years  You may need a mammogram at age 52 years or younger if you have an increased risk for breast cancer  Talk to your healthcare provider about when you should start having mammograms and how often you need them  Vaccines you may need:   · Get an influenza vaccine  every year  The influenza vaccine protects you from the flu  Several types of viruses cause the flu  The viruses change over time, so new vaccines are made each year  · Get a tetanus-diphtheria (Td) booster vaccine  every 10 years  This vaccine protects you against tetanus and diphtheria  Tetanus is a severe infection that may cause painful muscle spasms and lockjaw  Diphtheria is a severe bacterial infection that causes a thick covering in the back of your mouth and throat  · Get a human papillomavirus (HPV) vaccine  if you are female and aged 23 to 32 or male 23 to 24 and never received it  This vaccine protects you from HPV infection  HPV is the most common infection spread by sexual contact  HPV may also cause vaginal, penile, and anal cancers  · Get a pneumococcal vaccine  if you are aged 72 years or older  The pneumococcal vaccine is an injection given to protect you from pneumococcal disease  Pneumococcal disease is an infection caused by pneumococcal bacteria  The infection may cause pneumonia, meningitis, or an ear infection  · Get a shingles vaccine  if you are aged 61 or older, even if you have had shingles before  The shingles vaccine is an injection to protect you from the varicella-zoster virus  This is the same virus that causes chickenpox   Shingles is a painful rash that develops in people who had chickenpox or have been exposed to the virus  How to eat healthy:  My Plate is a model for planning healthy meals  It shows the types and amounts of foods that should go on your plate  Fruits and vegetables make up about half of your plate, and grains and protein make up the other half  A serving of dairy is included on the side of your plate  The amount of calories and serving sizes you need depends on your age, gender, weight, and height  Examples of healthy foods are listed below:  · Eat a variety of vegetables  such as dark green, red, and orange vegetables  You can also include canned vegetables low in sodium (salt) and frozen vegetables without added butter or sauces  · Eat a variety of fresh fruits , canned fruit in 100% juice, frozen fruit, and dried fruit  · Include whole grains  At least half of the grains you eat should be whole grains  Examples include whole-wheat bread, wheat pasta, brown rice, and whole-grain cereals such as oatmeal     · Eat a variety of protein foods such as seafood (fish and shellfish), lean meat, and poultry without skin (turkey and chicken)  Examples of lean meats include pork leg, shoulder, or tenderloin, and beef round, sirloin, tenderloin, and extra lean ground beef  Other protein foods include eggs and egg substitutes, beans, peas, soy products, nuts, and seeds  · Choose low-fat dairy products such as skim or 1% milk or low-fat yogurt, cheese, and cottage cheese  · Limit unhealthy fats  such as butter, hard margarine, and shortening  Exercise:  Exercise at least 30 minutes per day on most days of the week  Some examples of exercise include walking, biking, dancing, and swimming  You can also fit in more physical activity by taking the stairs instead of the elevator or parking farther away from stores  Include muscle strengthening activities 2 days each week  Regular exercise provides many health benefits   It helps you manage your weight, and decreases your risk for type 2 diabetes, heart disease, stroke, and high blood pressure  Exercise can also help improve your mood  Ask your healthcare provider about the best exercise plan for you  General health and safety guidelines:   · Do not smoke  Nicotine and other chemicals in cigarettes and cigars can cause lung damage  Ask your healthcare provider for information if you currently smoke and need help to quit  E-cigarettes or smokeless tobacco still contain nicotine  Talk to your healthcare provider before you use these products  · Limit alcohol  A drink of alcohol is 12 ounces of beer, 5 ounces of wine, or 1½ ounces of liquor  · Lose weight, if needed  Being overweight increases your risk of certain health conditions  These include heart disease, high blood pressure, type 2 diabetes, and certain types of cancer  · Protect your skin  Do not sunbathe or use tanning beds  Use sunscreen with a SPF 15 or higher  Apply sunscreen at least 15 minutes before you go outside  Reapply sunscreen every 2 hours  Wear protective clothing, hats, and sunglasses when you are outside  · Drive safely  Always wear your seatbelt  Make sure everyone in your car wears a seatbelt  A seatbelt can save your life if you are in an accident  Do not use your cell phone when you are driving  This could distract you and cause an accident  Pull over if you need to make a call or send a text message  · Practice safe sex  Use latex condoms if are sexually active and have more than one partner  Your healthcare provider may recommend screening tests for sexually transmitted infections (STIs)  · Wear helmets, lifejackets, and protective gear  Always wear a helmet when you ride a bike or motorcycle, go skiing, or play sports that could cause a head injury  Wear protective equipment when you play sports  Wear a lifejacket when you are on a boat or doing water sports    © 2017 [employer] Information is for End User's use only and may not be sold, redistributed or otherwise used for commercial purposes  All illustrations and images included in CareNotes® are the copyrighted property of [employer]  or [employer]  The above information is an  only  It is not intended as medical advice for individual conditions or treatments  Talk to your doctor, nurse or pharmacist before following any medical regimen to see if it is safe and effective for you  Cholesterol and Your Health   AMBULATORY CARE:   Cholesterol  is a waxy, fat-like substance  Cholesterol is made by your body, but also comes from certain foods you eat  Your body uses cholesterol to make hormones and new cells  Your body also uses cholesterol to protect nerves  Cholesterol comes from foods such as meat and dairy products  Your total cholesterol level is made up by LDL cholesterol, HDL cholesterol, and triglycerides:  · LDL cholesterol  is called bad cholesterol  because it forms plaque in your arteries  As plaque builds up, your arteries become narrow, and less blood flows through  When plaque decreases blood flow to your heart, you may have chest pain  If plaque completely blocks an artery that bring blood to your heart, you may have a heart attack  Plaque can break off and form blood clots  Blood clots may block arteries in your brain and cause a stroke  · HDL cholesterol  is called good cholesterol  because it helps remove LDL cholesterol from your arteries  It does this by attaching to LDL cholesterol and carrying it to your liver  Your liver breaks down LDL cholesterol so your body can get rid of it  High levels of HDL cholesterol can help prevent a heart attack and stroke  Low levels of HDL cholesterol can increase your risk for heart disease, heart attack, and stroke  · Triglycerides  are a type of fat that store energy from foods you eat  High levels of triglycerides also cause plaque buildup   This can increase your risk for a heart attack or stroke  If your triglyceride level is high, your LDL cholesterol level may also be high  How food affects your cholesterol levels:   · Unhealthy fats  increase LDL cholesterol and triglyceride levels in your blood  They are found in foods high in cholesterol, saturated fat, and trans fat:     ¨ Cholesterol  is found in eggs, dairy, and meat  ¨ Saturated fat  is found in butter, cheese, ice cream, whole milk, and coconut oil  Saturated fat is also found in meat, such as sausage, hot dogs, and bologna  ¨ Trans fat  is found in liquid oils and is used in fried and baked foods  Foods that contain trans fats include chips, crackers, muffins, sweet rolls, microwave popcorn, and cookies  · Healthy fats,  also called unsaturated fats, help lower LDL cholesterol and triglyceride levels  Healthy fats include the following:     ¨ Monounsaturated fats  are found in foods such as olive oil, canola oil, avocado, nuts, and olives  ¨ Polyunsaturated fats,  such as omega 3 fats, are found in fish, such as salmon, trout, and tuna  They can also be found in plant foods such as flaxseed, walnuts, and soybeans  Other things that affect your cholesterol levels:   · Smoking cigarettes    · Being overweight or obese     · Drinking large amounts of alcohol    · Not enough exercise or no exercise    · Certain genes passed from your parents to you  What you need to know about having your cholesterol levels checked: Adults 21to 39years of age should have their cholesterol levels checked every 4 to 6 years  Adults 45 years and older should have their cholesterol checked every 1 to 2 years  You may need your cholesterol checked more often, or at a younger age, if you have risk factors for heart disease  You may also need to have your cholesterol checked more often if you have other health conditions, such as diabetes  Blood tests are used to check cholesterol levels   Blood tests measure your levels of triglycerides, LDL cholesterol, and HDL cholesterol  Cholesterol level goals: Your cholesterol level goal may depend on your risk for heart disease  It may also depend on your age and other health conditions  Ask your healthcare provider if the following goals are right for you:  · Your total cholesterol level  should be less than 200 mg/dL  This number may also depend on your HDL and LDL cholesterol goals  · Your LDL cholesterol level  should be less than 130 mg/dL  · Your HDL cholesterol level  should be 60 mg/dL or higher  · Your triglyceride level  should be less than 150 mg/dL  Treatment for high cholesterol:  Treatment for high cholesterol will also decrease your risk of heart disease, heart attack, and stroke  Treatment may include any of the following:  · Medicines  may be given to lower your LDL cholesterol, triglyceride levels, or total cholesterol level  You may need medicines to lower your cholesterol if any of the following is true:     ¨ You have a history of stroke, TIA, unstable angina, or a heart attack    ¨ Your LDL cholesterol level is 190 mg/dL or higher    ¨ You are age 36to 76years of age, have diabetes, and your LDL cholesterol is 70 mg/dL or higher    ¨ You are age 36to 76years of age, have risk factors for heart disease, and your LDL cholesterol is 70 mg/dL or higher    · Lifestyle changes  include changes to your diet, exercise, weight loss, and quitting smoking  It also includes decreasing the amount of alcohol you drink  · Supplements  include fish oil, red yeast rice, and garlic  Fish oil may help lower your triglyceride and LDL cholesterol levels  It may also increase your HDL cholesterol level  Red yeast rice may help decrease your total cholesterol level and LDL cholesterol level  Garlic may help lower your total cholesterol level  Do not take these supplements without talking to your healthcare provider     Nutrition to help lower your cholesterol levels:  A registered dietitian can help you create a healthy eating plan  Read food labels and choose foods low in saturated fat, trans fats, and cholesterol  · Decrease the total amount of fat you eat  Choose lean meats, fat-free or 1% fat milk, and low-fat dairy products, such as yogurt and cheese  Try to limit or avoid red meats  Limit or do not eat fried foods or baked goods such as cookies  · Replace unhealthy fats with healthy fats  Cook foods in olive oil or canola oil  Choose soft margarines that are low in saturated fat and trans fat  Seeds, nuts, and avocados are other examples of healthy fats  · Eat foods with omega-3 fats  Examples include salmon, tuna, mackerel, walnuts, and flaxseed  Eat fish 2 times per week  Children and pregnant women should not eat fish that have high levels of mercury, such as shark, swordfish, and félix mackerel  · Increase the amount of plant-based foods you eat  Plant-based foods are low in cholesterol and fat  Eating more of these foods may help lower your cholesterol and help you lose weight  Examples of plant-based foods includes fruits, vegetables, legumes, and whole grains  Replace milk that contains dairy with almond, soy, or coconut milk  Eat beans and foods with soy for protein instead of meat  Ask your healthcare provider or dietitian for more information on plant-based foods  · Increase the amount of fiber you eat  High-fiber foods can help lower your LDL cholesterol  You should eat between 20 and 30 grams of fiber each day  Eat at least 5 servings of fruits and vegetables each day  Other examples of high-fiber foods include whole-grain or whole-wheat breads, pastas, or cereals, and brown rice  Eat 3 ounces of whole-grain foods each day  Increase fiber slowly  You may have abdominal discomfort, bloating, and gas if you add fiber to your diet too quickly  Lifestyle changes you can make to help lower your cholesterol levels:   · Maintain a healthy weight  Ask your healthcare provider how much you should weigh  Ask him or her to help you create a weight loss plan if you are overweight  Weight loss can decrease your total cholesterol and triglyceride levels  · Exercise regularly  Exercise can help lower your total cholesterol level and maintain a healthy weight  Exercise can also help increase your HDL cholesterol level  Work with your healthcare provider to create an exercise program that is right for you  Get at least 30 minutes of moderate exercise most days of the week  Examples of exercise include brisk walking, swimming, or biking  · Do not smoke  Nicotine and other chemicals in cigarettes and cigars can damage your lungs, heart, and blood vessels  They can also raise your triglyceride levels  Ask your healthcare provider for information if you currently smoke and need help to quit  E-cigarettes or smokeless tobacco still contain nicotine  Talk to your healthcare provider before you use these products  · Limit or do not drink alcohol  Alcohol can increase your triglyceride levels  Ask your healthcare provider if it is safe for you to drink alcohol  Also ask how much is safe for you to drink each day  © 2017 [address] [facility] Information is for End User's use only and may not be sold, redistributed or otherwise used for commercial purposes  All illustrations and images included in CareNotes® are the copyrighted property of [employer]  or [employer]  The above information is an  only  It is not intended as medical advice for individual conditions or treatments  Talk to your doctor, nurse or pharmacist before following any medical regimen to see if it is safe and effective for you

## 2019-10-28 NOTE — PROGRESS NOTES
ADULT ANNUAL [facility]    NAME: Isreal Blank  AGE: 60 y o  SEX: female  : 1955     DATE: 10/29/2019     Assessment and Plan:     Problem List Items Addressed This Visit        Cardiovascular and Mediastinum    Stroke [facility])     She has a prior history of stroke  I would like her to consider statin medication, but she declines at this time  Nervous and Auditory    Lumbar radiculopathy     Fortunately, she is much improved status post surgery in April  Continue with her exercise regimen  She will have as needed follow-up with Neurosurgery  Musculoskeletal and Integument    Spondylolisthesis of lumbar region     Pain improved status post surgery  Other    Pure hypercholesterolemia     Check fasting lipid panel and CMP  Relevant Orders    Comprehensive metabolic panel    Lipid Panel with Direct LDL reflex    Iron deficiency anemia     Continue to monitor CBC  Check iron studies as well  Relevant Orders    CBC and differential    Iron, TIBC and Ferritin Panel    Chronic bilateral low back pain    Spinal stenosis of lumbar region with neurogenic claudication     Pain is much improved status post surgery  Other Visit Diagnoses     Annual physical exam    -  Primary    Flu vaccine need        Relevant Orders    influenza vaccine, 3410-3415, quadrivalent, recombinant, PF, 0 5 mL, for patients 18 yr+ (FLUBLOK) (Completed)          Immunizations and preventive care screenings were discussed with patient today  Appropriate education was printed on patient's after visit summary  Counseling:  Alcohol/drug use: discussed moderation in alcohol intake, the recommendations for healthy alcohol use, and avoidance of illicit drug use  Dental Health: discussed importance of regular tooth brushing, flossing, and dental visits    · Exercise: the importance of regular exercise/physical activity was discussed  Recommend exercise 3-5 times per week for at least 30 minutes  Return in 1 year (on 10/28/2020)  Chief Complaint:     Chief Complaint   Patient presents with    Annual Exam    Flu Vaccine      History of Present Illness:     Adult Annual Physical   Patient here for a comprehensive physical exam  The patient reports no problems  She is much improved since her lumbar surgery in April  She has a prior history of stroke with a history of hyperlipidemia  She continues to decline statin therapy  She is approaching long term  She is currently living on her boat and looks forward to he taking it out on the water more often with long term  Diet and Physical Activity  · Diet/Nutrition: well balanced diet  · Exercise: vigorous cardiovascular exercise  Depression Screening  PHQ-9 Depression Screening    PHQ-9:    Frequency of the following problems over the past two weeks:       Little interest or pleasure in doing things:  0 - not at all  Feeling down, depressed, or hopeless:  0 - not at all  PHQ-2 Score:  0       General Health  · Sleep: sleeps well  · Hearing: normal - bilateral   · Vision: no issues- she will set up eye exam   · Dental: regular dental visits  /GYN Health  · Patient is: postmenopausal  · Following with gyn     Review of Systems:     Review of Systems   Constitutional: Negative for appetite change, chills, fatigue, fever and unexpected weight change  HENT: Negative for trouble swallowing  Eyes: Negative for visual disturbance  Respiratory: Negative for cough, chest tightness, shortness of breath and wheezing  Cardiovascular: Negative for chest pain, palpitations and leg swelling  Gastrointestinal: Negative for abdominal distention, abdominal pain, blood in stool, constipation and diarrhea  Endocrine: Negative for polyuria  Genitourinary: Negative for difficulty urinating and flank pain     Musculoskeletal: Negative for arthralgias and myalgias  Skin: Negative for rash  Neurological: Negative for dizziness and light-headedness  Hematological: Negative for adenopathy  Does not bruise/bleed easily  Psychiatric/Behavioral: Negative for sleep disturbance  Past Medical History:     Past Medical History:   Diagnosis Date    Cervical ca [facility]) 1979    Depression     Depression     Depression     Hyperlipidemia     Hyperlipidemia     Iron deficiency anemia     Low back pain     Meralgia paresthetica     Sciatica     right side    Stroke [facility])     embolic      Past Surgical History:     Past Surgical History:   Procedure Laterality Date     SECTION      x2   ,     COLONOSCOPY      HYSTEROSCOPY      ME ARTHDSIS POST/POSTEROLATRL/POSTINTERBODY LUMBAR N/A 2019    Procedure: MIS Navigated L3-4 laminectomy/decompression (right approach) with transforaminal lumbar interbody fusion;  Surgeon: Tahmina Mcconnell MD;  Location:  MAIN OR;  Service: Neurosurgery    [facility] N/A 2017    Procedure: COLONOSCOPY;  Surgeon: Salina Dyson DO;  Location: [facility] GI LAB;   Service: Gastroenterology    TUBAL LIGATION        Social History:     Social History     Socioeconomic History    Marital status:      Spouse name: None    Number of children: 2    Years of education: Bachelors degree    Highest education level: None   Occupational History    Occupation:    Social Needs    Financial resource strain: None    Food insecurity:     Worry: None     Inability: None    Transportation needs:     Medical: None     Non-medical: None   Tobacco Use    Smoking status: Never Smoker    Smokeless tobacco: Never Used   Substance and Sexual Activity    Alcohol use: Yes     Binge frequency: Never     Comment: socially    Drug use: Never    Sexual activity: None   Lifestyle    Physical activity:     Days per week: None     Minutes per session: None  Stress: None   Relationships    Social connections:     Talks on phone: None     Gets together: None     Attends Faith service: None     Active member of club or organization: None     Attends meetings of clubs or organizations: None     Relationship status: None    Intimate partner violence:     Fear of current or ex partner: None     Emotionally abused: None     Physically abused: None     Forced sexual activity: None   Other Topics Concern    None   Social History Narrative    None      Family History:     Family History   Problem Relation Age of Onset    Hyperlipidemia Mother     Heart disease Mother 64        alive in [de-identified]    Stroke Mother     Hypertension Family     Stroke Family     Depression Sister     Hypertension Sister     Cancer Maternal Grandfather       Current Medications:     Current Outpatient Medications   Medication Sig Dispense Refill    aspirin 325 mg tablet Take 325 mg by mouth daily      estradiol (ESTRACE VAGINAL) 0 1 mg/g vaginal cream Insert 1 g into the vagina once a week 42 5 g 3    multivitamin (THERAGRAN) TABS Take 1 tablet by mouth daily       No current facility-administered medications for this visit  Allergies: Allergies   Allergen Reactions    Paxil [Paroxetine] Rash      Physical Exam:     BP 98/60   Pulse 56   Resp 18   Ht 5' 2 5" (1 588 m)   Wt 54 kg (119 lb)   SpO2 98%   BMI 21 42 kg/m²     Physical Exam   Constitutional: She is oriented to person, place, and time  She appears well-developed and well-nourished  No distress  HENT:   Head: Normocephalic  Eyes: Pupils are equal, round, and reactive to light  Right eye exhibits no discharge  Left eye exhibits no discharge  Neck: No tracheal deviation present  No thyromegaly present  Cardiovascular: Normal rate, regular rhythm and normal heart sounds  No murmur heard  Pulmonary/Chest: Effort normal  No respiratory distress  She has no wheezes  She has no rales   She exhibits no tenderness  Abdominal: Soft  She exhibits no distension  There is no tenderness  Musculoskeletal: Normal range of motion  She exhibits no edema  Lymphadenopathy:     She has no cervical adenopathy  Neurological: She is alert and oriented to person, place, and time  No cranial nerve deficit  Skin: Skin is warm  She is not diaphoretic  No erythema  Psychiatric: She has a normal mood and affect   Judgment and thought content normal        Sumi Hernandez MD  [facility] [id number]

## 2019-10-30 NOTE — ASSESSMENT & PLAN NOTE
She has a prior history of stroke  I would like her to consider statin medication, but she declines at this time

## 2019-10-30 NOTE — ASSESSMENT & PLAN NOTE
Fortunately, she is much improved status post surgery in April  Continue with her exercise regimen  She will have as needed follow-up with Neurosurgery

## 2019-11-20 DIAGNOSIS — N95.2 ATROPHIC VAGINITIS: ICD-10-CM

## 2019-11-20 RX ORDER — ESTRADIOL 0.1 MG/G
1 CREAM VAGINAL WEEKLY
Qty: 42.5 G | Refills: 3 | Status: SHIPPED | OUTPATIENT
Start: 2019-11-20 | End: 2020-02-21

## 2020-02-21 ENCOUNTER — ANNUAL EXAM (OUTPATIENT)
Dept: GYNECOLOGY | Facility: CLINIC | Age: 65
End: 2020-02-21
Payer: COMMERCIAL

## 2020-02-21 VITALS
WEIGHT: 127.4 LBS | HEART RATE: 61 BPM | SYSTOLIC BLOOD PRESSURE: 98 MMHG | DIASTOLIC BLOOD PRESSURE: 61 MMHG | BODY MASS INDEX: 22.57 KG/M2 | HEIGHT: 63 IN

## 2020-02-21 DIAGNOSIS — Z12.31 ENCOUNTER FOR SCREENING MAMMOGRAM FOR MALIGNANT NEOPLASM OF BREAST: Primary | ICD-10-CM

## 2020-02-21 DIAGNOSIS — N95.2 ATROPHIC VAGINITIS: ICD-10-CM

## 2020-02-21 DIAGNOSIS — Z12.4 ENCOUNTER FOR PAPANICOLAOU SMEAR FOR CERVICAL CANCER SCREENING: ICD-10-CM

## 2020-02-21 PROCEDURE — 3008F BODY MASS INDEX DOCD: CPT | Performed by: OBSTETRICS & GYNECOLOGY

## 2020-02-21 PROCEDURE — S0612 ANNUAL GYNECOLOGICAL EXAMINA: HCPCS | Performed by: OBSTETRICS & GYNECOLOGY

## 2020-02-21 RX ORDER — ESTRADIOL 0.1 MG/G
1 CREAM VAGINAL WEEKLY
Qty: 85 G | Refills: 2 | Status: SHIPPED | OUTPATIENT
Start: 2020-02-21 | End: 2020-10-21 | Stop reason: SDUPTHER

## 2020-02-21 NOTE — PROGRESS NOTES
Assessment/Plan:         Diagnoses and all orders for this visit:    Encounter for screening mammogram for malignant neoplasm of breast  -     Mammo screening bilateral w 3d & cad; Future    Atrophic vaginitis  -     estradiol (ESTRACE VAGINAL) 0 1 mg/g vaginal cream; Insert 1 g into the vagina once a week Please dispense 2 tubes 42 5 gms each        Subjective:      Patient ID: Belgica Bean is a 63 y o  female  HPI  patient presents for annual examination  She offers no complaints  She is presently on Estrace cream vaginally and would like to continue on this  She denies any dysuria, hematuria or urgency  She has some mild infrequent stress urinary incontinence  No GI complaints  Colonoscopy 2017 normal due again in   Heel scan 2018 -0 5    The following portions of the patient's history were reviewed and updated as appropriate:   She  has a past medical history of Cervical ca ([address] ) (), Depression, Depression, Depression, Hyperlipidemia, Hyperlipidemia, Iron deficiency anemia, Low back pain, Meralgia paresthetica, Sciatica, and Stroke ([address] )  She   Patient Active Problem List    Diagnosis Date Noted    Spondylolisthesis of lumbar region 2019    Synovial cyst of lumbar facet joint 2019    Lumbar radiculopathy 2019    Spinal stenosis of lumbar region with neurogenic claudication 2019    Chronic bilateral low back pain 2018    SI (sacroiliac) joint dysfunction 2018    Stroke ([address] ) 2018    Cervical spinal stenosis 10/29/2013    Female stress incontinence 2012    Pure hypercholesterolemia 2012    Iron deficiency anemia 2012     She  has a past surgical history that includes  section; Colonoscopy; Hysteroscopy; Tubal ligation; pr colonoscopy flx dx w/collj spec when pfrmd (N/A, 2017); and pr arthdsis post/posterolatrl/postinterbody lumbar (N/A, 2019)    Her family history includes Cancer in her maternal grandfather; Depression in her sister; Heart disease (age of onset: 64) in her mother; Hyperlipidemia in her mother; Hypertension in her family and sister; Stroke in her family and mother  She  reports that she has never smoked  She has never used smokeless tobacco  She reports that she drank alcohol  She reports that she does not use drugs  Current Outpatient Medications   Medication Sig Dispense Refill    aspirin 325 mg tablet Take 325 mg by mouth daily      estradiol (ESTRACE VAGINAL) 0 1 mg/g vaginal cream Insert 1 g into the vagina once a week Please dispense 2 tubes 42 5 gms each 85 g 2    multivitamin (THERAGRAN) TABS Take 1 tablet by mouth daily       No current facility-administered medications for this visit  Current Outpatient Medications on File Prior to Visit   Medication Sig    aspirin 325 mg tablet Take 325 mg by mouth daily    multivitamin (THERAGRAN) TABS Take 1 tablet by mouth daily    [DISCONTINUED] estradiol (ESTRACE VAGINAL) 0 1 mg/g vaginal cream Insert 1 g into the vagina once a week     No current facility-administered medications on file prior to visit  She is allergic to paxil [paroxetine]       Review of Systems   Constitutional: Negative  HENT: Negative for sore throat and trouble swallowing  Gastrointestinal: Negative  Genitourinary: Negative  Objective:      BP 98/61 (BP Location: Left arm)   Pulse 61   Ht 5' 3" (1 6 m)   Wt 57 8 kg (127 lb 6 4 oz)   BMI 22 57 kg/m²          Physical Exam   Constitutional: She appears well-developed and well-nourished  Neck: Normal range of motion  Neck supple  No thyromegaly present  Cardiovascular: Normal rate, regular rhythm and normal heart sounds  Pulmonary/Chest: Effort normal and breath sounds normal  No respiratory distress  Right breast exhibits no inverted nipple, no mass, no nipple discharge, no skin change and no tenderness   Left breast exhibits no inverted nipple, no mass, no nipple discharge, no skin change and no tenderness  Abdominal: Soft  Bowel sounds are normal  She exhibits no distension and no mass  There is no tenderness  There is no rebound and no guarding  No hernia  Hernia confirmed negative in the right inguinal area and confirmed negative in the left inguinal area  Genitourinary: There is no rash, tenderness or lesion on the right labia  There is no rash, tenderness or lesion on the left labia  Uterus is not deviated, not enlarged, not fixed and not tender  Cervix exhibits no motion tenderness, no discharge and no friability  Right adnexum displays no mass, no tenderness and no fullness  Left adnexum displays no mass, no tenderness and no fullness  No erythema, tenderness or bleeding in the vagina  No vaginal discharge found  Lymphadenopathy: No inguinal adenopathy noted on the right or left side

## 2020-02-22 LAB
CYTOLOGIST CVX/VAG CYTO: NORMAL
DX ICD CODE: NORMAL
OTHER STN SPEC: NORMAL
OTHER STN SPEC: NORMAL
PATH REPORT.FINAL DX SPEC: NORMAL
SL AMB NOTE:: NORMAL
SL AMB SPECIMEN ADEQUACY: NORMAL
SL AMB TEST METHODOLOGY: NORMAL

## 2020-04-30 ENCOUNTER — TELEMEDICINE (OUTPATIENT)
Dept: FAMILY MEDICINE CLINIC | Facility: CLINIC | Age: 65
End: 2020-04-30
Payer: COMMERCIAL

## 2020-04-30 DIAGNOSIS — M65.311 TRIGGER FINGER OF RIGHT THUMB: Primary | ICD-10-CM

## 2020-04-30 PROCEDURE — 99213 OFFICE O/P EST LOW 20 MIN: CPT | Performed by: FAMILY MEDICINE

## 2020-05-27 ENCOUNTER — OFFICE VISIT (OUTPATIENT)
Dept: FAMILY MEDICINE CLINIC | Facility: CLINIC | Age: 65
End: 2020-05-27
Payer: COMMERCIAL

## 2020-05-27 VITALS
BODY MASS INDEX: 22.73 KG/M2 | RESPIRATION RATE: 18 BRPM | OXYGEN SATURATION: 98 % | TEMPERATURE: 97.8 F | WEIGHT: 128.3 LBS | HEIGHT: 63 IN | HEART RATE: 50 BPM | SYSTOLIC BLOOD PRESSURE: 100 MMHG | DIASTOLIC BLOOD PRESSURE: 62 MMHG

## 2020-05-27 DIAGNOSIS — M65.312 TRIGGER FINGER OF LEFT THUMB: Primary | ICD-10-CM

## 2020-05-27 DIAGNOSIS — E78.00 PURE HYPERCHOLESTEROLEMIA: ICD-10-CM

## 2020-05-27 DIAGNOSIS — D50.9 IRON DEFICIENCY ANEMIA, UNSPECIFIED IRON DEFICIENCY ANEMIA TYPE: ICD-10-CM

## 2020-05-27 DIAGNOSIS — I63.9 CEREBROVASCULAR ACCIDENT (CVA), UNSPECIFIED MECHANISM (HCC): ICD-10-CM

## 2020-05-27 PROBLEM — L30.9 DERMATITIS: Status: ACTIVE | Noted: 2020-05-27

## 2020-05-27 PROCEDURE — 1036F TOBACCO NON-USER: CPT | Performed by: FAMILY MEDICINE

## 2020-05-27 PROCEDURE — 3008F BODY MASS INDEX DOCD: CPT | Performed by: NEUROLOGICAL SURGERY

## 2020-05-27 PROCEDURE — 99213 OFFICE O/P EST LOW 20 MIN: CPT | Performed by: FAMILY MEDICINE

## 2020-06-22 ENCOUNTER — HOSPITAL ENCOUNTER (OUTPATIENT)
Dept: RADIOLOGY | Facility: HOSPITAL | Age: 65
Discharge: HOME | End: 2020-06-22
Payer: COMMERCIAL

## 2020-06-22 ENCOUNTER — APPOINTMENT (OUTPATIENT)
Dept: LAB | Facility: HOSPITAL | Age: 65
End: 2020-06-22
Payer: COMMERCIAL

## 2020-06-22 DIAGNOSIS — M48.062 SPINAL STENOSIS OF LUMBAR REGION WITH NEUROGENIC CLAUDICATION: ICD-10-CM

## 2020-06-22 DIAGNOSIS — I63.9 CEREBROVASCULAR ACCIDENT (CVA), UNSPECIFIED MECHANISM (HCC): ICD-10-CM

## 2020-06-22 DIAGNOSIS — M43.16 SPONDYLOLISTHESIS OF LUMBAR REGION: ICD-10-CM

## 2020-06-22 DIAGNOSIS — E78.00 PURE HYPERCHOLESTEROLEMIA: ICD-10-CM

## 2020-06-22 DIAGNOSIS — D50.9 IRON DEFICIENCY ANEMIA, UNSPECIFIED IRON DEFICIENCY ANEMIA TYPE: ICD-10-CM

## 2020-06-22 LAB
ALBUMIN SERPL BCP-MCNC: 4.1 G/DL (ref 3.5–5)
ALP SERPL-CCNC: 76 U/L (ref 46–116)
ALT SERPL W P-5'-P-CCNC: 25 U/L (ref 12–78)
ANION GAP SERPL CALCULATED.3IONS-SCNC: 8 MMOL/L (ref 4–13)
AST SERPL W P-5'-P-CCNC: 19 U/L (ref 5–45)
BASOPHILS # BLD AUTO: 0.06 THOUSANDS/ΜL (ref 0–0.1)
BASOPHILS NFR BLD AUTO: 1 % (ref 0–1)
BILIRUB SERPL-MCNC: 0.42 MG/DL (ref 0.2–1)
BUN SERPL-MCNC: 17 MG/DL (ref 5–25)
CALCIUM SERPL-MCNC: 9 MG/DL (ref 8.3–10.1)
CHLORIDE SERPL-SCNC: 103 MMOL/L (ref 100–108)
CHOLEST SERPL-MCNC: 280 MG/DL (ref 50–200)
CO2 SERPL-SCNC: 28 MMOL/L (ref 21–32)
CREAT SERPL-MCNC: 0.98 MG/DL (ref 0.6–1.3)
EOSINOPHIL # BLD AUTO: 0.1 THOUSAND/ΜL (ref 0–0.61)
EOSINOPHIL NFR BLD AUTO: 2 % (ref 0–6)
ERYTHROCYTE [DISTWIDTH] IN BLOOD BY AUTOMATED COUNT: 13.6 % (ref 11.6–15.1)
FERRITIN SERPL-MCNC: 25 NG/ML (ref 8–388)
GFR SERPL CREATININE-BSD FRML MDRD: 62 ML/MIN/1.73SQ M
GLUCOSE P FAST SERPL-MCNC: 85 MG/DL (ref 65–99)
HCT VFR BLD AUTO: 42.7 % (ref 34.8–46.1)
HDLC SERPL-MCNC: 80 MG/DL
HGB BLD-MCNC: 13.5 G/DL (ref 11.5–15.4)
IMM GRANULOCYTES # BLD AUTO: 0.02 THOUSAND/UL (ref 0–0.2)
IMM GRANULOCYTES NFR BLD AUTO: 0 % (ref 0–2)
IRON SERPL-MCNC: 70 UG/DL (ref 50–170)
LDLC SERPL CALC-MCNC: 173 MG/DL (ref 0–100)
LYMPHOCYTES # BLD AUTO: 2.21 THOUSANDS/ΜL (ref 0.6–4.47)
LYMPHOCYTES NFR BLD AUTO: 42 % (ref 14–44)
MCH RBC QN AUTO: 30.5 PG (ref 26.8–34.3)
MCHC RBC AUTO-ENTMCNC: 31.6 G/DL (ref 31.4–37.4)
MCV RBC AUTO: 96 FL (ref 82–98)
MONOCYTES # BLD AUTO: 0.4 THOUSAND/ΜL (ref 0.17–1.22)
MONOCYTES NFR BLD AUTO: 8 % (ref 4–12)
NEUTROPHILS # BLD AUTO: 2.54 THOUSANDS/ΜL (ref 1.85–7.62)
NEUTS SEG NFR BLD AUTO: 47 % (ref 43–75)
NRBC BLD AUTO-RTO: 0 /100 WBCS
PLATELET # BLD AUTO: 223 THOUSANDS/UL (ref 149–390)
PMV BLD AUTO: 10.5 FL (ref 8.9–12.7)
POTASSIUM SERPL-SCNC: 3.9 MMOL/L (ref 3.5–5.3)
PROT SERPL-MCNC: 7.8 G/DL (ref 6.4–8.2)
RBC # BLD AUTO: 4.43 MILLION/UL (ref 3.81–5.12)
SODIUM SERPL-SCNC: 139 MMOL/L (ref 136–145)
TIBC SERPL-MCNC: 328 UG/DL (ref 250–450)
TRIGL SERPL-MCNC: 137 MG/DL
WBC # BLD AUTO: 5.33 THOUSAND/UL (ref 4.31–10.16)

## 2020-06-22 PROCEDURE — 36415 COLL VENOUS BLD VENIPUNCTURE: CPT

## 2020-06-22 PROCEDURE — 83550 IRON BINDING TEST: CPT

## 2020-06-22 PROCEDURE — 72100 X-RAY EXAM L-S SPINE 2/3 VWS: CPT

## 2020-06-22 PROCEDURE — 85025 COMPLETE CBC W/AUTO DIFF WBC: CPT

## 2020-06-22 PROCEDURE — 83540 ASSAY OF IRON: CPT

## 2020-06-22 PROCEDURE — 82728 ASSAY OF FERRITIN: CPT

## 2020-06-22 PROCEDURE — 80061 LIPID PANEL: CPT

## 2020-06-22 PROCEDURE — 80053 COMPREHEN METABOLIC PANEL: CPT

## 2020-06-26 ENCOUNTER — TELEMEDICINE (OUTPATIENT)
Dept: NEUROSURGERY | Facility: CLINIC | Age: 65
End: 2020-06-26
Payer: COMMERCIAL

## 2020-06-26 DIAGNOSIS — M43.16 SPONDYLOLISTHESIS OF LUMBAR REGION: Primary | ICD-10-CM

## 2020-06-26 PROBLEM — M48.062 SPINAL STENOSIS OF LUMBAR REGION WITH NEUROGENIC CLAUDICATION: Status: RESOLVED | Noted: 2019-01-28 | Resolved: 2020-06-26

## 2020-06-26 PROBLEM — M71.38 SYNOVIAL CYST OF LUMBAR FACET JOINT: Status: RESOLVED | Noted: 2019-01-28 | Resolved: 2020-06-26

## 2020-06-26 PROBLEM — M54.16 LUMBAR RADICULOPATHY: Status: RESOLVED | Noted: 2019-01-28 | Resolved: 2020-06-26

## 2020-06-26 PROBLEM — G89.29 CHRONIC BILATERAL LOW BACK PAIN: Status: RESOLVED | Noted: 2018-11-23 | Resolved: 2020-06-26

## 2020-06-26 PROBLEM — M54.50 CHRONIC BILATERAL LOW BACK PAIN: Status: RESOLVED | Noted: 2018-11-23 | Resolved: 2020-06-26

## 2020-06-26 PROCEDURE — 99213 OFFICE O/P EST LOW 20 MIN: CPT | Performed by: NEUROLOGICAL SURGERY

## 2020-10-07 ENCOUNTER — IMMUNIZATIONS (OUTPATIENT)
Dept: FAMILY MEDICINE CLINIC | Facility: CLINIC | Age: 65
End: 2020-10-07
Payer: COMMERCIAL

## 2020-10-07 VITALS — TEMPERATURE: 97.8 F

## 2020-10-07 DIAGNOSIS — Z23 NEED FOR INFLUENZA VACCINATION: Primary | ICD-10-CM

## 2020-10-07 PROCEDURE — 90682 RIV4 VACC RECOMBINANT DNA IM: CPT

## 2020-10-07 PROCEDURE — 90471 IMMUNIZATION ADMIN: CPT

## 2020-10-21 ENCOUNTER — OFFICE VISIT (OUTPATIENT)
Dept: FAMILY MEDICINE CLINIC | Facility: CLINIC | Age: 65
End: 2020-10-21
Payer: COMMERCIAL

## 2020-10-21 VITALS
DIASTOLIC BLOOD PRESSURE: 80 MMHG | HEART RATE: 53 BPM | TEMPERATURE: 98.4 F | OXYGEN SATURATION: 96 % | WEIGHT: 125 LBS | RESPIRATION RATE: 16 BRPM | SYSTOLIC BLOOD PRESSURE: 144 MMHG | HEIGHT: 63 IN | BODY MASS INDEX: 22.15 KG/M2

## 2020-10-21 DIAGNOSIS — E78.00 PURE HYPERCHOLESTEROLEMIA: ICD-10-CM

## 2020-10-21 DIAGNOSIS — S61.012D LACERATION OF LEFT THUMB WITHOUT FOREIGN BODY WITHOUT DAMAGE TO NAIL, SUBSEQUENT ENCOUNTER: ICD-10-CM

## 2020-10-21 DIAGNOSIS — Z23 NEED FOR TDAP VACCINATION: ICD-10-CM

## 2020-10-21 DIAGNOSIS — D50.9 IRON DEFICIENCY ANEMIA, UNSPECIFIED IRON DEFICIENCY ANEMIA TYPE: ICD-10-CM

## 2020-10-21 DIAGNOSIS — M65.312 TRIGGER FINGER OF LEFT THUMB: ICD-10-CM

## 2020-10-21 DIAGNOSIS — N95.2 ATROPHIC VAGINITIS: ICD-10-CM

## 2020-10-21 DIAGNOSIS — Z87.440 HISTORY OF UTI: ICD-10-CM

## 2020-10-21 DIAGNOSIS — I63.9 CEREBROVASCULAR ACCIDENT (CVA), UNSPECIFIED MECHANISM (HCC): ICD-10-CM

## 2020-10-21 DIAGNOSIS — M48.02 CERVICAL SPINAL STENOSIS: ICD-10-CM

## 2020-10-21 DIAGNOSIS — Z00.00 WELL ADULT ON ROUTINE HEALTH CHECK: Primary | ICD-10-CM

## 2020-10-21 PROBLEM — S61.012A LACERATION OF LEFT THUMB: Status: ACTIVE | Noted: 2020-10-21

## 2020-10-21 PROBLEM — M53.3 SI (SACROILIAC) JOINT DYSFUNCTION: Status: RESOLVED | Noted: 2018-06-04 | Resolved: 2020-10-21

## 2020-10-21 PROCEDURE — 90471 IMMUNIZATION ADMIN: CPT | Performed by: FAMILY MEDICINE

## 2020-10-21 PROCEDURE — 90715 TDAP VACCINE 7 YRS/> IM: CPT | Performed by: FAMILY MEDICINE

## 2020-10-21 PROCEDURE — 99396 PREV VISIT EST AGE 40-64: CPT | Performed by: FAMILY MEDICINE

## 2020-10-21 RX ORDER — CIPROFLOXACIN 500 MG/1
500 TABLET, FILM COATED ORAL EVERY 12 HOURS SCHEDULED
Qty: 14 TABLET | Refills: 0 | Status: SHIPPED | OUTPATIENT
Start: 2020-10-21 | End: 2021-10-13 | Stop reason: SDUPTHER

## 2020-10-21 RX ORDER — ESTRADIOL 0.1 MG/G
1 CREAM VAGINAL WEEKLY
Qty: 85 G | Refills: 2 | Status: SHIPPED | OUTPATIENT
Start: 2020-10-21 | End: 2021-08-31

## 2020-10-21 RX ORDER — PHENAZOPYRIDINE HYDROCHLORIDE 200 MG/1
200 TABLET, FILM COATED ORAL
Qty: 21 TABLET | Refills: 0 | Status: SHIPPED | OUTPATIENT
Start: 2020-10-21 | End: 2021-07-26 | Stop reason: ALTCHOICE

## 2020-10-22 PROCEDURE — 20550 NJX 1 TENDON SHEATH/LIGAMENT: CPT | Performed by: FAMILY MEDICINE

## 2020-10-22 RX ORDER — TRIAMCINOLONE ACETONIDE 40 MG/ML
20 INJECTION, SUSPENSION INTRA-ARTICULAR; INTRAMUSCULAR
Status: COMPLETED | OUTPATIENT
Start: 2020-10-22 | End: 2020-10-22

## 2020-10-22 RX ORDER — LIDOCAINE HYDROCHLORIDE 5 MG/ML
0.5 INJECTION, SOLUTION INFILTRATION; PERINEURAL
Status: COMPLETED | OUTPATIENT
Start: 2020-10-22 | End: 2020-10-22

## 2020-10-22 RX ADMIN — LIDOCAINE HYDROCHLORIDE 0.5 ML: 5 INJECTION, SOLUTION INFILTRATION; PERINEURAL at 22:20

## 2020-10-22 RX ADMIN — TRIAMCINOLONE ACETONIDE 20 MG: 40 INJECTION, SUSPENSION INTRA-ARTICULAR; INTRAMUSCULAR at 22:20

## 2021-04-13 DIAGNOSIS — Z23 ENCOUNTER FOR IMMUNIZATION: ICD-10-CM

## 2021-06-09 ENCOUNTER — OFFICE VISIT (OUTPATIENT)
Dept: FAMILY MEDICINE CLINIC | Facility: CLINIC | Age: 66
End: 2021-06-09
Payer: COMMERCIAL

## 2021-06-09 VITALS
OXYGEN SATURATION: 98 % | HEART RATE: 53 BPM | WEIGHT: 123.4 LBS | BODY MASS INDEX: 21.86 KG/M2 | DIASTOLIC BLOOD PRESSURE: 78 MMHG | SYSTOLIC BLOOD PRESSURE: 122 MMHG | HEIGHT: 63 IN

## 2021-06-09 DIAGNOSIS — M25.512 CHRONIC LEFT SHOULDER PAIN: ICD-10-CM

## 2021-06-09 DIAGNOSIS — M48.02 CERVICAL SPINAL STENOSIS: ICD-10-CM

## 2021-06-09 DIAGNOSIS — Z78.0 POST-MENOPAUSAL: ICD-10-CM

## 2021-06-09 DIAGNOSIS — E78.00 PURE HYPERCHOLESTEROLEMIA: ICD-10-CM

## 2021-06-09 DIAGNOSIS — M65.312 TRIGGER FINGER OF LEFT THUMB: ICD-10-CM

## 2021-06-09 DIAGNOSIS — G89.29 CHRONIC LEFT SHOULDER PAIN: ICD-10-CM

## 2021-06-09 DIAGNOSIS — I63.9 CEREBROVASCULAR ACCIDENT (CVA), UNSPECIFIED MECHANISM (HCC): ICD-10-CM

## 2021-06-09 DIAGNOSIS — Z12.31 ENCOUNTER FOR SCREENING MAMMOGRAM FOR BREAST CANCER: Primary | ICD-10-CM

## 2021-06-09 PROBLEM — S61.012A LACERATION OF LEFT THUMB: Status: RESOLVED | Noted: 2020-10-21 | Resolved: 2021-06-09

## 2021-06-09 PROCEDURE — 3008F BODY MASS INDEX DOCD: CPT | Performed by: FAMILY MEDICINE

## 2021-06-09 PROCEDURE — 99214 OFFICE O/P EST MOD 30 MIN: CPT | Performed by: FAMILY MEDICINE

## 2021-06-09 PROCEDURE — 20600 DRAIN/INJ JOINT/BURSA W/O US: CPT | Performed by: FAMILY MEDICINE

## 2021-06-09 PROCEDURE — 1036F TOBACCO NON-USER: CPT | Performed by: FAMILY MEDICINE

## 2021-06-09 RX ORDER — TRIAMCINOLONE ACETONIDE 40 MG/ML
20 INJECTION, SUSPENSION INTRA-ARTICULAR; INTRAMUSCULAR
Status: COMPLETED | OUTPATIENT
Start: 2021-06-09 | End: 2021-06-09

## 2021-06-09 RX ADMIN — TRIAMCINOLONE ACETONIDE 20 MG: 40 INJECTION, SUSPENSION INTRA-ARTICULAR; INTRAMUSCULAR at 23:40

## 2021-06-09 NOTE — PROGRESS NOTES
Small joint arthrocentesis  Universal Protocol:  Consent: Verbal consent obtained  Consent given by: patient  Patient understanding: patient states understanding of the procedure being performed  Patient consent: the patient's understanding of the procedure matches consent given  Patient identity confirmed: verbally with patient    Supporting Documentation  Indications: pain and joint swelling   Procedure Details  Location: thumb - Thumb joint: Flexor tendon not palmar crease  Needle size: 25 G  Ultrasound guidance: no  Medications administered: 20 mg triamcinolone acetonide 40 mg/mL    Patient tolerance: patient tolerated the procedure well with no immediate complications  Dressing:  Sterile dressing applied        Assessment/Plan:       Problem List Items Addressed This Visit        Cardiovascular and Mediastinum    Stroke [facility])    Relevant Orders    CBC and differential    Comprehensive metabolic panel    Lipid Panel with Direct LDL reflex       Musculoskeletal and Integument    Trigger finger of left thumb    Relevant Medications    triamcinolone acetonide (KENALOG-40) 40 mg/mL injection 20 mg (Completed)    Other Relevant Orders    Ambulatory referral to Hand Surgery    Small joint arthrocentesis (Completed)       Other    Cervical spinal stenosis    Relevant Orders    XR spine cervical complete 4 or 5 vw non injury    XR shoulder 2+ vw left    Pure hypercholesterolemia    Relevant Orders    Comprehensive metabolic panel    Lipid Panel with Direct LDL reflex    Chronic left shoulder pain    Relevant Orders    XR spine cervical complete 4 or 5 vw non injury    XR shoulder 2+ vw left      Other Visit Diagnoses     Encounter for screening mammogram for breast cancer    -  Primary    Relevant Orders    Mammo screening bilateral w 3d & cad    Post-menopausal        Relevant Orders    DXA bone density spine hip and pelvis            Subjective:      Patient ID: Carla Duvall is a 61 y o  female      HPI the patient presents today for follow-up for chronic health issues  She has spent the past year traveling on her boat  She does have a history of stroke remotely  This happened while she was on [place]  She is having no further stroke-like symptoms  She is having pain in her left shoulder that is been persistent for the past 2-3 months  She denies any acute injury  The pain is worse with occasional movement such as lifting her arm  Pain resolves pretty quickly on its own  She has not had similar pain in the past     She does have a history of cervical spinal stenosis and is having some occasional tingling down bilateral arms which she believes involves all of her fingers intermittently  She has no significant strength loss in her hands  She has history of hyperlipidemia  She has declined statin therapy  She has a prior history of iron deficiency anemia, but recent blood work did not reveal issues  She denies excessive fatigue  She complains of triggering of her left thumb  She has had 2 prior injections which have worked quite well and she requests another 1 today  This is painful on occasion  The following portions of the patient's history were reviewed and updated as appropriate: allergies, current medications, past family history, past medical history, past social history, past surgical history and problem list       Current Outpatient Medications:     aspirin 325 mg tablet, Take 325 mg by mouth daily, Disp: , Rfl:     estradiol (ESTRACE VAGINAL) 0 1 mg/g vaginal cream, Insert 1 g into the vagina once a week Please dispense 2 tubes 42 5 gms each, Disp: 85 g, Rfl: 2    multivitamin (THERAGRAN) TABS, Take 1 tablet by mouth daily, Disp: , Rfl:     phenazopyridine (PYRIDIUM) 200 mg tablet, Take 1 tablet (200 mg total) by mouth 3 (three) times a day with meals (Patient not taking: Reported on 6/9/2021), Disp: 21 tablet, Rfl: 0  No current facility-administered medications for this visit  Review of Systems   Constitutional: Negative for appetite change, chills, fatigue, fever and unexpected weight change  HENT: Negative for trouble swallowing  Eyes: Negative for visual disturbance  Respiratory: Negative for cough, chest tightness, shortness of breath and wheezing  Cardiovascular: Negative for chest pain, palpitations and leg swelling  Gastrointestinal: Negative for abdominal distention, abdominal pain, blood in stool, constipation and diarrhea  Endocrine: Negative for polyuria  Genitourinary: Negative for difficulty urinating and flank pain  Musculoskeletal: Positive for arthralgias (Left shoulder pain) and back pain  Negative for myalgias  Skin: Negative for rash  Neurological: Negative for dizziness and light-headedness  Hematological: Negative for adenopathy  Does not bruise/bleed easily  Psychiatric/Behavioral: Negative for dysphoric mood and sleep disturbance  The patient is not nervous/anxious  Objective:      /78 (BP Location: Left arm, Patient Position: Sitting, Cuff Size: Adult)   Pulse (!) 53   Ht 5' 3" (1 6 m)   Wt 56 kg (123 lb 6 4 oz)   SpO2 98%   BMI 21 86 kg/m²          Physical Exam  Constitutional:       General: She is not in acute distress  Appearance: She is well-developed  She is not diaphoretic  HENT:      Head: Normocephalic  Eyes:      General:         Right eye: No discharge  Left eye: No discharge  Pupils: Pupils are equal, round, and reactive to light  Neck:      Thyroid: No thyromegaly  Trachea: No tracheal deviation  Cardiovascular:      Rate and Rhythm: Normal rate and regular rhythm  Heart sounds: Normal heart sounds  No murmur  Pulmonary:      Effort: Pulmonary effort is normal  No respiratory distress  Breath sounds: No wheezing or rales  Abdominal:      General: There is no distension  Palpations: Abdomen is soft  Tenderness: There is no abdominal tenderness  Musculoskeletal: Normal range of motion  Comments: She has full range of motion of her cervical spine  She does have some pain to palpation over the posterior aspect of her left AC joint  Shoulder has full range of motion  She has a minimal amount of pain with impingement testing  Lymphadenopathy:      Cervical: No cervical adenopathy  Skin:     General: Skin is warm  Findings: No erythema  Neurological:      Mental Status: She is alert and oriented to person, place, and time  Cranial Nerves: No cranial nerve deficit  Psychiatric:         Thought Content:  Thought content normal          Judgment: Judgment normal            Hector Linder MD

## 2021-06-10 ENCOUNTER — APPOINTMENT (OUTPATIENT)
Dept: RADIOLOGY | Facility: MEDICAL CENTER | Age: 66
End: 2021-06-10
Payer: COMMERCIAL

## 2021-06-10 DIAGNOSIS — M25.512 CHRONIC LEFT SHOULDER PAIN: ICD-10-CM

## 2021-06-10 DIAGNOSIS — G89.29 CHRONIC LEFT SHOULDER PAIN: ICD-10-CM

## 2021-06-10 DIAGNOSIS — M48.02 CERVICAL SPINAL STENOSIS: ICD-10-CM

## 2021-06-10 PROCEDURE — 72050 X-RAY EXAM NECK SPINE 4/5VWS: CPT

## 2021-06-10 PROCEDURE — 73030 X-RAY EXAM OF SHOULDER: CPT

## 2021-06-21 ENCOUNTER — TELEPHONE (OUTPATIENT)
Dept: FAMILY MEDICINE CLINIC | Facility: CLINIC | Age: 66
End: 2021-06-21

## 2021-06-21 DIAGNOSIS — M48.02 CERVICAL SPINAL STENOSIS: Primary | ICD-10-CM

## 2021-06-21 NOTE — TELEPHONE ENCOUNTER
Patient with persistent cervical radiculopathy    She has noted cervical spinal stenosis on her x-ray and I would like to proceed with the MRI

## 2021-06-21 NOTE — TELEPHONE ENCOUNTER
Pt called in stating she received a message on her [employer] asking if she wanted to go ahead with the MRI and she called back and would like to have the MRI done so please put the order in for the pt to have it done and let pt know the order has been put in so she can schedule the apt

## 2021-06-21 NOTE — ASSESSMENT & PLAN NOTE
X-rays do show some significant degenerative changes  Check MRI in light of her persistent neuropathic symptoms

## 2021-07-26 ENCOUNTER — OFFICE VISIT (OUTPATIENT)
Dept: OBGYN CLINIC | Facility: CLINIC | Age: 66
End: 2021-07-26
Payer: COMMERCIAL

## 2021-07-26 VITALS
SYSTOLIC BLOOD PRESSURE: 103 MMHG | DIASTOLIC BLOOD PRESSURE: 80 MMHG | WEIGHT: 128 LBS | BODY MASS INDEX: 22.68 KG/M2 | HEIGHT: 63 IN

## 2021-07-26 DIAGNOSIS — M65.312 TRIGGER FINGER OF LEFT THUMB: ICD-10-CM

## 2021-07-26 PROCEDURE — 3008F BODY MASS INDEX DOCD: CPT | Performed by: ORTHOPAEDIC SURGERY

## 2021-07-26 PROCEDURE — 1036F TOBACCO NON-USER: CPT | Performed by: ORTHOPAEDIC SURGERY

## 2021-07-26 PROCEDURE — 99244 OFF/OP CNSLTJ NEW/EST MOD 40: CPT | Performed by: ORTHOPAEDIC SURGERY

## 2021-07-26 RX ORDER — LIDOCAINE HYDROCHLORIDE AND EPINEPHRINE 10; 10 MG/ML; UG/ML
20 INJECTION, SOLUTION INFILTRATION; PERINEURAL
Status: CANCELLED | OUTPATIENT
Start: 2021-07-27 | End: 2021-07-28

## 2021-07-26 NOTE — PROGRESS NOTES
ASSESSMENT/PLAN:    Assessment:   Trigger Finger  left  thumb    Plan:   Left trigger thumb release was discussed at length including risks and benefits, surgery is aprox  98 percent affective   Risks of surgery consists of but not limited to bleeding, infection, stiffness, injury to surrounding structures, etc  Left trigger thumb release informed surgical consent was signed in the office today, under local      Follow Up: After Surgery    To Do Next Visit:  Sutures out    Operative Discussions:     Trigger Finger Release: The anatomy and physiology of trigger finger was discussed with the patient today in the office  Edema and increased contact pressure within the flexor tendons at the A1 pulley can cause pain, crepitation, and limitation of function  Treatment options include resting MP blocking splints to decrease edema, oral anti-inflammatory medications, home or formal therapy exercises, up to 2 steroid injections or surgical release  While majority of patients do respond to conservative treatment, up to 20% may require surgical release  The patient has elected release of the trigger finger  The patient has elected to undergo a release of the A1 pulley (trigger finger)  A small incision will be made over the palmar aspect of the hand, the tendon sheath holding the flexor tendons will be released  In the postoperative period, light activities are allowed immediately, driving is allowed when narcotic medication has stopped, and the incision may get wet after 2 days  Heavy activities (lifting more than approximately 10 pounds) will be allowed after the follow up appointment in 1-2 weeks  While the pain and discomfort within the wrist typically improves rapidly, some residual discomfort may be present for up to 6 weeks    The nodule that is typically palpable in the palmar aspect of the hand will not be removed, as this would necessitate removal of a portion of the flexor tendon, however the catching, clicking, and locking should resolve  Approximate success rate is 98%  The risks and benefits of the procedure were explained to the patient, which include, but are not limited to: Bleeding, infection, recurrence, pain, scar, damage to tendons, damage to nerves, and damage to blood vessels, need for future surgery and complications related to anesthesia  If bony work is done, risks also include malunion and nonunion  These risks, along with alternative conservative treatment options, and postoperative protocols were voiced back and understood by the patient  All questions were answered to the patient's satisfaction  The patient agrees to comply with a standard postoperative protocol, and is willing to proceed  Education was provided via written and auditory forms  There were no barriers to learning  Written handouts regarding wound care, incision and scar care, and general preoperative information, as well as risks and benefits were provided to the patient     _____________________________________________________  CHIEF COMPLAINT:  Chief Complaint   Patient presents with    Left Thumb - Triggering         SUBJECTIVE:  Marilyn Cartagena is a 61 y o  female who presents with Catching and Locking to the left thumb  I am seeing Lizbeth Hylton in consultation at the request of Dr Amelie Piedra  This started aprox  1 year ago  She notes continued clicking, catching and locking of her left thumb s/p trigger thumb CSI's  Pain is located to the A1 pulley area of the thumb  She does have to use the other hand to unlock the thumb  She is not taking anything for pain control at this time       Radiation: None  Previous Treatments: steroid injections with only partial relief  Associated symptoms: Catching and Locking  Handedness: right  Work status: retired     PAST MEDICAL HISTORY:  Past Medical History:   Diagnosis Date    Cervical ca [facility]) 1979    Chronic bilateral low back pain 11/23/2018    Depression     Depression  Depression     Hyperlipidemia     Hyperlipidemia     Iron deficiency anemia     Laceration of left thumb 10/21/2020    Low back pain     Lumbar radiculopathy 2019    S/p MIS Navigated L3-4 laminectomy/decompression (right approach) with transforaminal lumbar interbody fusion Dr Piyush Carvalho 19      Meralgia paresthetica     Sciatica     right side    Spinal stenosis of lumbar region with neurogenic claudication 2019    Spondylolisthesis of lumbar region 3/8/2019    Stroke [facility])     embolic    Synovial cyst of lumbar facet joint 2019       PAST SURGICAL HISTORY:  Past Surgical History:   Procedure Laterality Date     SECTION      x2   [address] POST/POSTEROLATRL/POSTINTERBODY LUMBAR N/A 2019    Procedure: MIS Navigated L3-4 laminectomy/decompression (right approach) with transforaminal lumbar interbody fusion;  Surgeon: Floridalma Nelson MD;  Location:  MAIN OR;  Service: Neurosurgery    [place] COLONOSCOPY FLX DX W/[facility] INPATIENT REH[facility]ILITATION WHEN PFRMD N/A 2017    Procedure: COLONOSCOPY;  Surgeon: Shelry Osorio DO;  Location: [facility] LAB;   Service: Gastroenterology    TUBAL LIGATION         FAMILY HISTORY:  Family History   Problem Relation Age of Onset    Hyperlipidemia Mother     Heart disease Mother 64        alive in [de-identified]    Stroke Mother     Hypertension Family     Stroke Family     Depression Sister     Hypertension Sister     Cancer Maternal Grandfather        SOCIAL HISTORY:  Social History     Tobacco Use    Smoking status: Never Smoker    Smokeless tobacco: Never Used   Vaping Use    Vaping Use: Never used   Substance Use Topics    Alcohol use: Not Currently     Comment: socially    Drug use: Never       MEDICATIONS:    Current Outpatient Medications:     aspirin 325 mg tablet, Take 325 mg by mouth daily, Disp: , Rfl:     estradiol (ESTRACE VAGINAL) 0 1 mg/g vaginal cream, Insert 1 g into the vagina once a week Please dispense 2 tubes 42 5 gms each, Disp: 85 g, Rfl: 2    multivitamin (THERAGRAN) TABS, Take 1 tablet by mouth daily, Disp: , Rfl:     phenazopyridine (PYRIDIUM) 200 mg tablet, Take 1 tablet (200 mg total) by mouth 3 (three) times a day with meals (Patient not taking: Reported on 6/9/2021), Disp: 21 tablet, Rfl: 0    ALLERGIES:  Allergies   Allergen Reactions    Paxil [Paroxetine] Rash       REVIEW OF SYSTEMS:  Pertinent items are noted in HPI  A comprehensive review of systems was negative  LABS:  HgA1c:   Lab Results   Component Value Date    HGBA1C 5 9 03/15/2019     BMP:   Lab Results   Component Value Date    CALCIUM 9 0 06/22/2020    K 3 9 06/22/2020    CO2 28 06/22/2020     06/22/2020    BUN 17 06/22/2020    CREATININE 0 98 06/22/2020         _____________________________________________________  PHYSICAL EXAMINATION:  Vital signs: There were no vitals taken for this visit    General: well developed and well nourished, alert, oriented times 3 and appears comfortable  Psychiatric: Normal  HEENT: Trachea Midline, No torticollis  Cardiovascular: No discernable arrhythmia  Pulmonary: No wheezing or stridor  Abdomen: No rebound or guarding  Extremities: No peripheral edema  Skin: No masses, erythema, lacerations, fluctation, ulcerations  Neurovascular: Sensation Intact to the Median, Ulnar, Radial Nerve, Motor Intact to the Median, Ulnar, Radial Nerve and Pulses Intact    MUSCULOSKELETAL EXAMINATION:    LEFT SIDE:  Thumb: no erythema, ecchymosis or edema, palpable nodule to A1 pulley with clicking and crepitus, A1 pulley non tender to palpation today, full composite fist, brisk capillary refill      _____________________________________________________  STUDIES REVIEWED:  No Studies to review      PROCEDURES PERFORMED:  Procedures  No Procedures performed today    Scribe Attestation    I,:  Christie Palacio am acting as a scribe while in the presence of the attending physician :       I,: Randy Moncada MD personally performed the services described in this documentation    as scribed in my presence :

## 2021-07-26 NOTE — LETTER
July 27, 2021     Isaura Salcido MD  [address]  [address] [address]    Patient: Joann Fuentes   YOB: 1955   Date of Visit: 7/26/2021       Dear Dr Johann Cornell: Thank you for referring Bennett Mayfield to me for evaluation  Below are my notes for this consultation  If you have questions, please do not hesitate to call me  I look forward to following your patient along with you  Sincerely,        Tyron Howard MD        CC: No Recipients  Tyron Howard MD  7/26/2021  4:10 PM  Signed  ASSESSMENT/PLAN:    Assessment:   Trigger Finger  left  thumb    Plan:   Left trigger thumb release was discussed at length including risks and benefits, surgery is aprox  98 percent affective   Risks of surgery consists of but not limited to bleeding, infection, stiffness, injury to surrounding structures, etc  Left trigger thumb release informed surgical consent was signed in the office today, under local      Follow Up: After Surgery    To Do Next Visit:  Sutures out    Operative Discussions:     Trigger Finger Release: The anatomy and physiology of trigger finger was discussed with the patient today in the office  Edema and increased contact pressure within the flexor tendons at the A1 pulley can cause pain, crepitation, and limitation of function  Treatment options include resting MP blocking splints to decrease edema, oral anti-inflammatory medications, home or formal therapy exercises, up to 2 steroid injections or surgical release  While majority of patients do respond to conservative treatment, up to 20% may require surgical release  The patient has elected release of the trigger finger  The patient has elected to undergo a release of the A1 pulley (trigger finger)  A small incision will be made over the palmar aspect of the hand, the tendon sheath holding the flexor tendons will be released    In the postoperative period, light activities are allowed immediately, driving is allowed when narcotic medication has stopped, and the incision may get wet after 2 days  Heavy activities (lifting more than approximately 10 pounds) will be allowed after the follow up appointment in 1-2 weeks  While the pain and discomfort within the wrist typically improves rapidly, some residual discomfort may be present for up to 6 weeks  The nodule that is typically palpable in the palmar aspect of the hand will not be removed, as this would necessitate removal of a portion of the flexor tendon, however the catching, clicking, and locking should resolve  Approximate success rate is 98%  The risks and benefits of the procedure were explained to the patient, which include, but are not limited to: Bleeding, infection, recurrence, pain, scar, damage to tendons, damage to nerves, and damage to blood vessels, need for future surgery and complications related to anesthesia  If bony work is done, risks also include malunion and nonunion  These risks, along with alternative conservative treatment options, and postoperative protocols were voiced back and understood by the patient  All questions were answered to the patient's satisfaction  The patient agrees to comply with a standard postoperative protocol, and is willing to proceed  Education was provided via written and auditory forms  There were no barriers to learning  Written handouts regarding wound care, incision and scar care, and general preoperative information, as well as risks and benefits were provided to the patient     _____________________________________________________  CHIEF COMPLAINT:  Chief Complaint   Patient presents with    Left Thumb - Triggering         SUBJECTIVE:  Carolyne Seo is a 61 y o  female who presents with Catching and Locking to the left thumb  I am seeing Zenia Pierre in consultation at the request of Dr Lainey Moya  This started aprox  1 year ago   She notes continued clicking, catching and locking of her left thumb s/p trigger thumb CSI's  Pain is located to the A1 pulley area of the thumb  She does have to use the other hand to unlock the thumb  She is not taking anything for pain control at this time  Radiation: None  Previous Treatments: steroid injections with only partial relief  Associated symptoms: Catching and Locking  Handedness: right  Work status: retired     PAST MEDICAL HISTORY:  Past Medical History:   Diagnosis Date    Cervical ca [facility]) 1979    Chronic bilateral low back pain 2018    Depression     Depression     Depression     Hyperlipidemia     Hyperlipidemia     Iron deficiency anemia     Laceration of left thumb 10/21/2020    Low back pain     Lumbar radiculopathy 2019    S/p MIS Navigated L3-4 laminectomy/decompression (right approach) with transforaminal lumbar interbody fusion Dr Sanjeev Barone 19      Meralgia paresthetica     Sciatica     right side    Spinal stenosis of lumbar region with neurogenic claudication 2019    Spondylolisthesis of lumbar region 3/8/2019    Stroke [facility])     embolic    Synovial cyst of lumbar facet joint 2019       PAST SURGICAL HISTORY:  Past Surgical History:   Procedure Laterality Date     SECTION      x2   [address] POST/POSTEROLATRL/POSTINTERBODY LUMBAR N/A 2019    Procedure: MIS Navigated L3-4 laminectomy/decompression (right approach) with transforaminal lumbar interbody fusion;  Surgeon: Radames Cushing, MD;  Location:  MAIN OR;  Service: Neurosurgery    [place] COLONOSCOPY FLX DX W/[facility] INPATIENT REHABILITATION WHEN PFRMD N/A 2017    Procedure: COLONOSCOPY;  Surgeon: Gamaliel Bonds DO;  Location: [facility] GI LAB;   Service: Gastroenterology    TUBAL LIGATION         FAMILY HISTORY:  Family History   Problem Relation Age of Onset    Hyperlipidemia Mother     Heart disease Mother 64        alive in [de-identified] Stroke Mother     Hypertension Family     Stroke Family     Depression Sister     Hypertension Sister     Cancer Maternal Grandfather        SOCIAL HISTORY:  Social History     Tobacco Use    Smoking status: Never Smoker    Smokeless tobacco: Never Used   Vaping Use    Vaping Use: Never used   Substance Use Topics    Alcohol use: Not Currently     Comment: socially    Drug use: Never       MEDICATIONS:    Current Outpatient Medications:     aspirin 325 mg tablet, Take 325 mg by mouth daily, Disp: , Rfl:     estradiol (ESTRACE VAGINAL) 0 1 mg/g vaginal cream, Insert 1 g into the vagina once a week Please dispense 2 tubes 42 5 gms each, Disp: 85 g, Rfl: 2    multivitamin (THERAGRAN) TABS, Take 1 tablet by mouth daily, Disp: , Rfl:     phenazopyridine (PYRIDIUM) 200 mg tablet, Take 1 tablet (200 mg total) by mouth 3 (three) times a day with meals (Patient not taking: Reported on 6/9/2021), Disp: 21 tablet, Rfl: 0    ALLERGIES:  Allergies   Allergen Reactions    Paxil [Paroxetine] Rash       REVIEW OF SYSTEMS:  Pertinent items are noted in HPI  A comprehensive review of systems was negative  LABS:  HgA1c:   Lab Results   Component Value Date    HGBA1C 5 9 03/15/2019     BMP:   Lab Results   Component Value Date    CALCIUM 9 0 06/22/2020    K 3 9 06/22/2020    CO2 28 06/22/2020     06/22/2020    BUN 17 06/22/2020    CREATININE 0 98 06/22/2020         _____________________________________________________  PHYSICAL EXAMINATION:  Vital signs: There were no vitals taken for this visit    General: well developed and well nourished, alert, oriented times 3 and appears comfortable  Psychiatric: Normal  HEENT: Trachea Midline, No torticollis  Cardiovascular: No discernable arrhythmia  Pulmonary: No wheezing or stridor  Abdomen: No rebound or guarding  Extremities: No peripheral edema  Skin: No masses, erythema, lacerations, fluctation, ulcerations  Neurovascular: Sensation Intact to the Median, Ulnar, Radial Nerve, Motor Intact to the Median, Ulnar, Radial Nerve and Pulses Intact    MUSCULOSKELETAL EXAMINATION:    LEFT SIDE:  Thumb: no erythema, ecchymosis or edema, palpable nodule to A1 pulley with clicking and crepitus, A1 pulley non tender to palpation today, full composite fist, brisk capillary refill      _____________________________________________________  STUDIES REVIEWED:  No Studies to review      PROCEDURES PERFORMED:  Procedures  No Procedures performed today    Scribe Attestation    I,:  Delfino Palacio am acting as a scribe while in the presence of the attending physician :       I,:  Shu Sumner MD personally performed the services described in this documentation    as scribed in my presence :

## 2021-07-26 NOTE — H&P
ASSESSMENT/PLAN:    Assessment:   Trigger Finger  left  thumb    Plan:   Left trigger thumb release was discussed at length including risks and benefits, surgery is aprox  98 percent affective   Risks of surgery consists of but not limited to bleeding, infection, stiffness, injury to surrounding structures, etc  Left trigger thumb release informed surgical consent was signed in the office today, under local      Follow Up: After Surgery    To Do Next Visit:  Sutures out    Operative Discussions:     Trigger Finger Release: The anatomy and physiology of trigger finger was discussed with the patient today in the office  Edema and increased contact pressure within the flexor tendons at the A1 pulley can cause pain, crepitation, and limitation of function  Treatment options include resting MP blocking splints to decrease edema, oral anti-inflammatory medications, home or formal therapy exercises, up to 2 steroid injections or surgical release  While majority of patients do respond to conservative treatment, up to 20% may require surgical release  The patient has elected release of the trigger finger  The patient has elected to undergo a release of the A1 pulley (trigger finger)  A small incision will be made over the palmar aspect of the hand, the tendon sheath holding the flexor tendons will be released  In the postoperative period, light activities are allowed immediately, driving is allowed when narcotic medication has stopped, and the incision may get wet after 2 days  Heavy activities (lifting more than approximately 10 pounds) will be allowed after the follow up appointment in 1-2 weeks  While the pain and discomfort within the wrist typically improves rapidly, some residual discomfort may be present for up to 6 weeks    The nodule that is typically palpable in the palmar aspect of the hand will not be removed, as this would necessitate removal of a portion of the flexor tendon, however the catching, clicking, and locking should resolve  Approximate success rate is 98%  The risks and benefits of the procedure were explained to the patient, which include, but are not limited to: Bleeding, infection, recurrence, pain, scar, damage to tendons, damage to nerves, and damage to blood vessels, need for future surgery and complications related to anesthesia  If bony work is done, risks also include malunion and nonunion  These risks, along with alternative conservative treatment options, and postoperative protocols were voiced back and understood by the patient  All questions were answered to the patient's satisfaction  The patient agrees to comply with a standard postoperative protocol, and is willing to proceed  Education was provided via written and auditory forms  There were no barriers to learning  Written handouts regarding wound care, incision and scar care, and general preoperative information, as well as risks and benefits were provided to the patient     _____________________________________________________  CHIEF COMPLAINT:  Chief Complaint   Patient presents with    Left Thumb - Triggering         SUBJECTIVE:  Joshua Ann is a 61 y o  female who presents with Catching and Locking to the left thumb  I am seeing David Pierre in consultation at the request of Dr Alva Pemberton  This started aprox  1 year ago  She notes continued clicking, catching and locking of her left thumb s/p trigger thumb CSI's  Pain is located to the A1 pulley area of the thumb  She does have to use the other hand to unlock the thumb  She is not taking anything for pain control at this time       Radiation: None  Previous Treatments: steroid injections with only partial relief  Associated symptoms: Catching and Locking  Handedness: right  Work status: retired     PAST MEDICAL HISTORY:  Past Medical History:   Diagnosis Date    Cervical ca [facility]) 1979    Chronic bilateral low back pain 11/23/2018    Depression     Depression  Depression     Hyperlipidemia     Hyperlipidemia     Iron deficiency anemia     Laceration of left thumb 10/21/2020    Low back pain     Lumbar radiculopathy 2019    S/p MIS Navigated L3-4 laminectomy/decompression (right approach) with transforaminal lumbar interbody fusion Dr Rene Chase 19      Meralgia paresthetica     Sciatica     right side    Spinal stenosis of lumbar region with neurogenic claudication 2019    Spondylolisthesis of lumbar region 3/8/2019    Stroke [facility])     embolic    Synovial cyst of lumbar facet joint 2019       PAST SURGICAL HISTORY:  Past Surgical History:   Procedure Laterality Date     SECTION      x2   [address] POST/POSTEROLATRL/POSTINTERBODY LUMBAR N/A 2019    Procedure: MIS Navigated L3-4 laminectomy/decompression (right approach) with transforaminal lumbar interbody fusion;  Surgeon: Janell Baron MD;  Location:  MAIN OR;  Service: Neurosurgery    [place] COLONOSCOPY FLX DX W/[facility] WHEN PFRMD N/A 2017    Procedure: COLONOSCOPY;  Surgeon: Rogelia Prader, DO;  Location: [facility] GI LAB;   Service: Gastroenterology    TUBAL LIGATION         FAMILY HISTORY:  Family History   Problem Relation Age of Onset    Hyperlipidemia Mother     Heart disease Mother 64        alive in [de-identified]    Stroke Mother     Hypertension Family     Stroke Family     Depression Sister     Hypertension Sister     Cancer Maternal Grandfather        SOCIAL HISTORY:  Social History     Tobacco Use    Smoking status: Never Smoker    Smokeless tobacco: Never Used   Vaping Use    Vaping Use: Never used   Substance Use Topics    Alcohol use: Not Currently     Comment: socially    Drug use: Never       MEDICATIONS:    Current Outpatient Medications:     aspirin 325 mg tablet, Take 325 mg by mouth daily, Disp: , Rfl:     estradiol (ESTRACE VAGINAL) 0 1 mg/g vaginal cream, Insert 1 g into the vagina once a week Please dispense 2 tubes 42 5 gms each, Disp: 85 g, Rfl: 2    multivitamin (THERAGRAN) TABS, Take 1 tablet by mouth daily, Disp: , Rfl:     phenazopyridine (PYRIDIUM) 200 mg tablet, Take 1 tablet (200 mg total) by mouth 3 (three) times a day with meals (Patient not taking: Reported on 6/9/2021), Disp: 21 tablet, Rfl: 0    ALLERGIES:  Allergies   Allergen Reactions    Paxil [Paroxetine] Rash       REVIEW OF SYSTEMS:  Pertinent items are noted in HPI  A comprehensive review of systems was negative  LABS:  HgA1c:   Lab Results   Component Value Date    HGBA1C 5 9 03/15/2019     BMP:   Lab Results   Component Value Date    CALCIUM 9 0 06/22/2020    K 3 9 06/22/2020    CO2 28 06/22/2020     06/22/2020    BUN 17 06/22/2020    CREATININE 0 98 06/22/2020         _____________________________________________________  PHYSICAL EXAMINATION:  Vital signs: There were no vitals taken for this visit    General: well developed and well nourished, alert, oriented times 3 and appears comfortable  Psychiatric: Normal  HEENT: Trachea Midline, No torticollis  Cardiovascular: No discernable arrhythmia  Pulmonary: No wheezing or stridor  Abdomen: No rebound or guarding  Extremities: No peripheral edema  Skin: No masses, erythema, lacerations, fluctation, ulcerations  Neurovascular: Sensation Intact to the Median, Ulnar, Radial Nerve, Motor Intact to the Median, Ulnar, Radial Nerve and Pulses Intact    MUSCULOSKELETAL EXAMINATION:    LEFT SIDE:  Thumb: no erythema, ecchymosis or edema, palpable nodule to A1 pulley with clicking and crepitus, A1 pulley non tender to palpation today, full composite fist, brisk capillary refill      _____________________________________________________  STUDIES REVIEWED:  No Studies to review      PROCEDURES PERFORMED:  Procedures  No Procedures performed today    Scribe Attestation    I,:  Rachel Palacio am acting as a scribe while in the presence of the attending physician :       I,: Tory Sevilla MD personally performed the services described in this documentation    as scribed in my presence :

## 2021-07-27 ENCOUNTER — HOSPITAL ENCOUNTER (OUTPATIENT)
Dept: MRI IMAGING | Facility: HOSPITAL | Age: 66
Discharge: HOME | End: 2021-07-27
Payer: COMMERCIAL

## 2021-07-27 ENCOUNTER — TELEPHONE (OUTPATIENT)
Dept: FAMILY MEDICINE CLINIC | Facility: CLINIC | Age: 66
End: 2021-07-27

## 2021-07-27 DIAGNOSIS — M48.02 CERVICAL SPINAL STENOSIS: ICD-10-CM

## 2021-07-27 PROCEDURE — G1004 CDSM NDSC: HCPCS

## 2021-07-27 PROCEDURE — 72141 MRI NECK SPINE W/O DYE: CPT

## 2021-07-27 NOTE — TELEPHONE ENCOUNTER
PATIENT CALLED SAID SHE HAD HER MRI TODAY BUT THOUGHT IT WAS FOR HER L SHOULDER HOWERVER IT WAS AN MRI OF HER NECK SHE SAID THAT'S FINE BUT WHAT IS THE NEXT STEP FOR HER SHOULDER?

## 2021-07-29 NOTE — TELEPHONE ENCOUNTER
Pt called to find out when dr Sanjay Mays was going to respond about her MRI     Please see note from 7/27 that august took

## 2021-08-02 ENCOUNTER — TELEPHONE (OUTPATIENT)
Dept: FAMILY MEDICINE CLINIC | Facility: CLINIC | Age: 66
End: 2021-08-02

## 2021-08-02 DIAGNOSIS — M25.512 CHRONIC LEFT SHOULDER PAIN: ICD-10-CM

## 2021-08-02 DIAGNOSIS — M48.02 CERVICAL SPINAL STENOSIS: Primary | ICD-10-CM

## 2021-08-02 DIAGNOSIS — G89.29 CHRONIC LEFT SHOULDER PAIN: ICD-10-CM

## 2021-08-02 NOTE — TELEPHONE ENCOUNTER
Patient is requesting a general referral to PT  Patient is currently in [place] and has found an office to begin PT treatment with, but needs a referral faxed to their office  If appropriate, please fill/sign, if appropriate      [facility] and [address] [place] [address] Road, MD  (f) [phone]

## 2021-08-04 ENCOUNTER — TELEPHONE (OUTPATIENT)
Dept: FAMILY MEDICINE CLINIC | Facility: CLINIC | Age: 66
End: 2021-08-04

## 2021-08-04 NOTE — TELEPHONE ENCOUNTER
GERALDINE CALLED FROM [facility] SAID THEY NEED A SCRIPT REFERAL FROM [address] DOCTOR SHE WILL HAVE AN APPOINTMENT FOR THERAPY FOR HER LEFT SHOULDER PAIN  THIS  CAN BE FAXED [phone]

## 2021-08-12 NOTE — TELEPHONE ENCOUNTER
Order placed    Problem List Items Addressed This Visit        Other    Cervical spinal stenosis - Primary    Relevant Orders    Ambulatory referral to Physical Therapy    Chronic left shoulder pain    Relevant Orders    Ambulatory referral to Physical Therapy

## 2021-08-19 ENCOUNTER — OFFICE VISIT (OUTPATIENT)
Dept: PODIATRY | Facility: CLINIC | Age: 66
End: 2021-08-19
Payer: COMMERCIAL

## 2021-08-19 ENCOUNTER — APPOINTMENT (OUTPATIENT)
Dept: RADIOLOGY | Facility: CLINIC | Age: 66
End: 2021-08-19
Payer: COMMERCIAL

## 2021-08-19 VITALS
HEART RATE: 52 BPM | BODY MASS INDEX: 22.68 KG/M2 | DIASTOLIC BLOOD PRESSURE: 87 MMHG | HEIGHT: 63 IN | WEIGHT: 128 LBS | SYSTOLIC BLOOD PRESSURE: 155 MMHG

## 2021-08-19 DIAGNOSIS — S92.421B OPEN DISPLACED FRACTURE OF DISTAL PHALANX OF RIGHT GREAT TOE, INITIAL ENCOUNTER: Primary | ICD-10-CM

## 2021-08-19 DIAGNOSIS — S97.111A CRUSHING INJURY OF RIGHT GREAT TOE, INITIAL ENCOUNTER: ICD-10-CM

## 2021-08-19 DIAGNOSIS — S92.421B OPEN DISPLACED FRACTURE OF DISTAL PHALANX OF RIGHT GREAT TOE, INITIAL ENCOUNTER: ICD-10-CM

## 2021-08-19 PROCEDURE — 99203 OFFICE O/P NEW LOW 30 MIN: CPT | Performed by: PODIATRIST

## 2021-08-19 PROCEDURE — 1036F TOBACCO NON-USER: CPT | Performed by: PODIATRIST

## 2021-08-19 PROCEDURE — 73660 X-RAY EXAM OF TOE(S): CPT

## 2021-08-19 NOTE — PROGRESS NOTES
updated as appropriate:   She  has a past medical history of Cervical ca ([facility] [address] ) (1979), Chronic bilateral low back pain (2018), Depression, Depression, Depression, Hyperlipidemia, Hyperlipidemia, Iron deficiency anemia, Laceration of left thumb (10/21/2020), Low back pain, Lumbar radiculopathy (2019), Meralgia paresthetica, Sciatica, Spinal stenosis of lumbar region with neurogenic claudication (2019), Spondylolisthesis of lumbar region (3/8/2019), Stroke ([facility] [address] ), and Synovial cyst of lumbar facet joint (2019)  She   Patient Active Problem List    Diagnosis Date Noted    Chronic left shoulder pain 2021    History of UTI 10/21/2020    Dermatitis 2020    Trigger finger of left thumb 2020    Stroke ([facility] [address] ) 2018    Cervical spinal stenosis 10/29/2013    Female stress incontinence 2012    Pure hypercholesterolemia 2012    Iron deficiency anemia 2012     She  has a past surgical history that includes  section; Colonoscopy; Hysteroscopy; Tubal ligation; pr colonoscopy flx dx w/collj spec when pfrmd (N/A, 2017); and pr arthdsis post/posterolatrl/postinterbody lumbar (N/A, 2019)  Her family history includes Cancer in her maternal grandfather; Depression in her sister; Heart disease (age of onset: 64) in her mother; Hyperlipidemia in her mother; Hypertension in her family and sister; Stroke in her family and mother  She  reports that she has never smoked  She has never used smokeless tobacco  She reports previous alcohol use  She reports that she does not use drugs    Current Outpatient Medications   Medication Sig Dispense Refill    aspirin 325 mg tablet Take 325 mg by mouth daily      estradiol (ESTRACE VAGINAL) 0 1 mg/g vaginal cream Insert 1 g into the vagina once a week Please dispense 2 tubes 42 5 gms each 85 g 2    multivitamin (THERAGRAN) TABS Take 1 tablet by mouth daily       No current facility-administered medications for this visit  Current Outpatient Medications on File Prior to Visit   Medication Sig    aspirin 325 mg tablet Take 325 mg by mouth daily    estradiol (ESTRACE VAGINAL) 0 1 mg/g vaginal cream Insert 1 g into the vagina once a week Please dispense 2 tubes 42 5 gms each    multivitamin (THERAGRAN) TABS Take 1 tablet by mouth daily     No current facility-administered medications on file prior to visit  She is allergic to paxil [paroxetine]       Review of Systems   Constitutional: Negative  HENT: Negative  Respiratory: Negative  Cardiovascular: Negative  Gastrointestinal: Negative  Musculoskeletal: Positive for arthralgias and joint swelling  Skin: Positive for color change and wound  Negative for rash  Neurological: Negative for weakness and numbness  Psychiatric/Behavioral: The patient is not nervous/anxious  Objective:      /87   Pulse (!) 52   Ht 5' 3" (1 6 m) Comment: verbal  Wt 58 1 kg (128 lb)   BMI 22 67 kg/m²          Physical Exam    Vitals reviewed    Constitutional: Patient is not distressed  Patient is well developed  Patient is healthy weight  Vascular: Dorsalis pedis and posterior tibial pulses palpable  Capillary refill time within normal limits to all digits  No erythema  No edema  No significant varicosities  Dermatology: Right great toe hematoma with 75% detached toenail  Tenderness to the entire distal phalanx  No IPJ or MTPJ pain or instability  No pus or cellulitis  There is dry eschar to the tip of the great toe    Musculoskeletal: Normal range of motion to ankle, subtalar joint, and midtarsal joint  Normal range of motion first MTPJ  Manual muscle testing 5 out of 5 for inversion/eversion/dorsiflexion/plantarflexion  On stance patients feet are generally rectus  Neurological: Monofilament sensation is intact  Vibratory sensation is intact     Achilles reflex is normal    Proprioception is normal    Respiratory: Normal respiratory effort, no distress    Psych: Patient is AAOx3  Normal mood  Lymphatic: nonpalpable popliteal lymph nodes  Nonpalpable groin lymph nodes            XRay report pulled from [address] [place] [address] Rd on 7/28/21:  Kristina Soriano MD - 07/28/2021   Formatting of this note might be different from the original    History: Crush injury to the foot and right great toe  Interpretation: The right foot was examined with 3 views, and the right great   toe was examined with 2 views performed on 7/28/2021  Comparison: None  There is a mildly displaced fracture involving the tuft of the distal phalanx of   the great toe  No other acute fractures demonstrated  Articular surfaces are   unremarkable  No destructive osseous lesion  Punctate densities are associated with the soft tissues of the great toe which   may be on the skin surface  IMPRESSION:   Impression:   Mildly displaced fracture of the tuft of the distal phalanx of the right great   toe  Multiple punctate hyperdensities overlying the soft tissues may be on the   skin surface

## 2021-08-31 ENCOUNTER — OFFICE VISIT (OUTPATIENT)
Dept: GYNECOLOGY | Facility: CLINIC | Age: 66
End: 2021-08-31
Payer: COMMERCIAL

## 2021-08-31 VITALS
SYSTOLIC BLOOD PRESSURE: 120 MMHG | HEART RATE: 69 BPM | WEIGHT: 129 LBS | HEIGHT: 72 IN | BODY MASS INDEX: 17.47 KG/M2 | DIASTOLIC BLOOD PRESSURE: 68 MMHG

## 2021-08-31 DIAGNOSIS — Z01.419 ENCOUNTER FOR GYNECOLOGICAL EXAMINATION WITHOUT ABNORMAL FINDING: Primary | ICD-10-CM

## 2021-08-31 DIAGNOSIS — N95.2 ATROPHIC VAGINITIS: ICD-10-CM

## 2021-08-31 DIAGNOSIS — Z13.820 SCREENING FOR OSTEOPOROSIS: ICD-10-CM

## 2021-08-31 DIAGNOSIS — Z12.4 ENCOUNTER FOR PAPANICOLAOU SMEAR FOR CERVICAL CANCER SCREENING: ICD-10-CM

## 2021-08-31 PROCEDURE — 3008F BODY MASS INDEX DOCD: CPT | Performed by: PODIATRIST

## 2021-08-31 PROCEDURE — G0145 SCR C/V CYTO,THINLAYER,RESCR: HCPCS | Performed by: OBSTETRICS & GYNECOLOGY

## 2021-08-31 PROCEDURE — S0612 ANNUAL GYNECOLOGICAL EXAMINA: HCPCS | Performed by: OBSTETRICS & GYNECOLOGY

## 2021-08-31 PROCEDURE — 76977 US BONE DENSITY MEASURE: CPT | Performed by: OBSTETRICS & GYNECOLOGY

## 2021-08-31 RX ORDER — ESTRADIOL 0.1 MG/G
1 CREAM VAGINAL WEEKLY
Qty: 85 G | Refills: 2 | Status: SHIPPED | OUTPATIENT
Start: 2021-08-31 | End: 2021-10-11 | Stop reason: SDUPTHER

## 2021-08-31 NOTE — PROGRESS NOTES
Assessment/Plan:         Diagnoses and all orders for this visit:    Encounter for gynecological examination without abnormal finding    Atrophic vaginitis  -     estradiol (ESTRACE VAGINAL) 0 1 mg/g vaginal cream; Insert 1 g into the vagina once a week Please dispense 2 tubes 42 5 gms each    Screening for osteoporosis; heel scan: -0 7        Subjective:      Patient ID: Marilyn Cartagena is a 61 y o  female  HPI patient presents for annual examination  She denies any vaginal irritation, burning, discharge or bleeding  Denies any dysuria, hematuria, urgency or urinary incontinence  No GI complaints  Colonoscopy 2017 negative  Repeat in 5 years secondary to family history  Osteoporosis screening via heel scan 2018:-0 5    The following portions of the patient's history were reviewed and updated as appropriate:   She  has a past medical history of Cervical ca ([facility] [address] ) (), Chronic bilateral low back pain (2018), Depression, Depression, Depression, Hyperlipidemia, Hyperlipidemia, Iron deficiency anemia, Laceration of left thumb (10/21/2020), Low back pain, Lumbar radiculopathy (2019), Meralgia paresthetica, Sciatica, Spinal stenosis of lumbar region with neurogenic claudication (2019), Spondylolisthesis of lumbar region (3/8/2019), Stroke ([facility] [address] ), and Synovial cyst of lumbar facet joint (2019)  She   Patient Active Problem List    Diagnosis Date Noted    Chronic left shoulder pain 2021    History of UTI 10/21/2020    Dermatitis 2020    Trigger finger of left thumb 2020    Stroke ([facility] [address] ) 2018    Cervical spinal stenosis 10/29/2013    Female stress incontinence 2012    Pure hypercholesterolemia 2012    Iron deficiency anemia 2012     She  has a past surgical history that includes  section; Colonoscopy; Hysteroscopy;  Tubal ligation; pr colonoscopy flx dx w/collj spec when pfrmd (N/A, 2017); pr arthdsis post/posterolatrl/postinterbody lumbar (N/A, 04/02/2019); and Toe Surgery (Right, 07/2021)  Her family history includes Cancer in her maternal grandfather; Depression in her sister; Heart disease (age of onset: 64) in her mother; Hyperlipidemia in her mother; Hypertension in her family and sister; Stroke in her family and mother  She  reports that she has never smoked  She has never used smokeless tobacco  She reports previous alcohol use  She reports that she does not use drugs  Current Outpatient Medications   Medication Sig Dispense Refill    aspirin 325 mg tablet Take 325 mg by mouth daily      estradiol (ESTRACE VAGINAL) 0 1 mg/g vaginal cream Insert 1 g into the vagina once a week Please dispense 2 tubes 42 5 gms each 85 g 2    multivitamin (THERAGRAN) TABS Take 1 tablet by mouth daily       No current facility-administered medications for this visit  Current Outpatient Medications on File Prior to Visit   Medication Sig    aspirin 325 mg tablet Take 325 mg by mouth daily    multivitamin (THERAGRAN) TABS Take 1 tablet by mouth daily    [DISCONTINUED] estradiol (ESTRACE VAGINAL) 0 1 mg/g vaginal cream Insert 1 g into the vagina once a week Please dispense 2 tubes 42 5 gms each     No current facility-administered medications on file prior to visit  She is allergic to paxil [paroxetine]       Review of Systems   Constitutional: Negative  HENT: Negative for sore throat and trouble swallowing  Gastrointestinal: Negative  Genitourinary: Negative  Objective:      /68   Pulse 69   Ht 6' 3 5" (1 918 m)   Wt 58 5 kg (129 lb)   BMI 15 91 kg/m²          Physical Exam  Vitals reviewed  Constitutional:       Appearance: Normal appearance  She is normal weight  Cardiovascular:      Rate and Rhythm: Normal rate and regular rhythm  Pulses: Normal pulses  Heart sounds: Normal heart sounds  No murmur heard       Pulmonary:      Effort: Pulmonary effort is normal  No respiratory distress  Breath sounds: Normal breath sounds  Chest:      Breasts:         Right: No swelling, bleeding, inverted nipple, mass, nipple discharge, skin change or tenderness  Left: No swelling, bleeding, inverted nipple, mass, nipple discharge, skin change or tenderness  Abdominal:      General: Abdomen is flat  There is no distension  Palpations: Abdomen is soft  There is no mass  Tenderness: There is no abdominal tenderness  There is no guarding or rebound  Hernia: No hernia is present  There is no hernia in the left inguinal area or right inguinal area  Genitourinary:     General: Normal vulva  Labia:         Right: No rash, tenderness or lesion  Left: No rash, tenderness or lesion  Vagina: Normal       Cervix: Normal       Uterus: Normal        Adnexa:         Right: No mass, tenderness or fullness  Left: No mass, tenderness or fullness  Musculoskeletal:      Cervical back: Normal range of motion and neck supple  No tenderness  Lymphadenopathy:      Cervical: No cervical adenopathy  Upper Body:      Right upper body: No supraclavicular, axillary or pectoral adenopathy  Left upper body: No supraclavicular, axillary or pectoral adenopathy  Lower Body: No right inguinal adenopathy  No left inguinal adenopathy  Neurological:      Mental Status: She is alert

## 2021-09-07 LAB
LAB AP GYN PRIMARY INTERPRETATION: NORMAL
Lab: NORMAL

## 2021-09-13 ENCOUNTER — TELEPHONE (OUTPATIENT)
Dept: FAMILY MEDICINE CLINIC | Facility: CLINIC | Age: 66
End: 2021-09-13

## 2021-09-13 DIAGNOSIS — M48.02 CERVICAL SPINAL STENOSIS: Primary | ICD-10-CM

## 2021-09-13 NOTE — TELEPHONE ENCOUNTER
Patient called said that the physical therpay for er shoulder is not working and she would like to be referred to pain management

## 2021-09-14 ENCOUNTER — HOSPITAL ENCOUNTER (OUTPATIENT)
Facility: HOSPITAL | Age: 66
Setting detail: OUTPATIENT SURGERY
Discharge: HOME | End: 2021-09-14
Attending: ORTHOPAEDIC SURGERY | Admitting: ORTHOPAEDIC SURGERY
Payer: COMMERCIAL

## 2021-09-14 VITALS
HEIGHT: 63 IN | RESPIRATION RATE: 20 BRPM | HEART RATE: 52 BPM | SYSTOLIC BLOOD PRESSURE: 140 MMHG | WEIGHT: 125 LBS | TEMPERATURE: 98.7 F | DIASTOLIC BLOOD PRESSURE: 80 MMHG | OXYGEN SATURATION: 97 % | BODY MASS INDEX: 22.15 KG/M2

## 2021-09-14 DIAGNOSIS — M65.312 TRIGGER FINGER OF LEFT THUMB: Primary | ICD-10-CM

## 2021-09-14 PROCEDURE — NC001 PR NO CHARGE: Performed by: ORTHOPAEDIC SURGERY

## 2021-09-14 PROCEDURE — 26055 INCISE FINGER TENDON SHEATH: CPT | Performed by: PHYSICIAN ASSISTANT

## 2021-09-14 PROCEDURE — 26055 INCISE FINGER TENDON SHEATH: CPT | Performed by: ORTHOPAEDIC SURGERY

## 2021-09-14 RX ORDER — HYDROCODONE BITARTRATE AND ACETAMINOPHEN 5; 325 MG/1; MG/1
1 TABLET ORAL EVERY 6 HOURS PRN
Qty: 5 TABLET | Refills: 0 | Status: SHIPPED | OUTPATIENT
Start: 2021-09-14 | End: 2021-09-14 | Stop reason: SDUPTHER

## 2021-09-14 RX ORDER — NAPROXEN SODIUM 220 MG
220 TABLET ORAL 2 TIMES DAILY WITH MEALS
Qty: 20 TABLET | Refills: 0 | Status: SHIPPED | OUTPATIENT
Start: 2021-09-14 | End: 2021-09-27 | Stop reason: ALTCHOICE

## 2021-09-14 RX ORDER — HYDROCODONE BITARTRATE AND ACETAMINOPHEN 5; 325 MG/1; MG/1
1 TABLET ORAL EVERY 6 HOURS PRN
Qty: 5 TABLET | Refills: 0 | Status: SHIPPED | OUTPATIENT
Start: 2021-09-14 | End: 2021-09-19

## 2021-09-14 RX ORDER — SENNOSIDES 8.6 MG
650 CAPSULE ORAL EVERY 8 HOURS PRN
Qty: 30 TABLET | Refills: 0 | Status: SHIPPED | OUTPATIENT
Start: 2021-09-14 | End: 2021-09-14 | Stop reason: SDUPTHER

## 2021-09-14 RX ORDER — MAGNESIUM HYDROXIDE 1200 MG/15ML
LIQUID ORAL AS NEEDED
Status: DISCONTINUED | OUTPATIENT
Start: 2021-09-14 | End: 2021-09-14 | Stop reason: HOSPADM

## 2021-09-14 RX ORDER — SENNOSIDES 8.6 MG
650 CAPSULE ORAL EVERY 8 HOURS PRN
Qty: 30 TABLET | Refills: 0 | Status: SHIPPED | OUTPATIENT
Start: 2021-09-14 | End: 2021-09-27 | Stop reason: ALTCHOICE

## 2021-09-14 RX ORDER — NAPROXEN SODIUM 220 MG
220 TABLET ORAL 2 TIMES DAILY WITH MEALS
Qty: 20 TABLET | Refills: 0 | Status: SHIPPED | OUTPATIENT
Start: 2021-09-14 | End: 2021-09-14 | Stop reason: SDUPTHER

## 2021-09-14 RX ORDER — LIDOCAINE HYDROCHLORIDE AND EPINEPHRINE 10; 10 MG/ML; UG/ML
20 INJECTION, SOLUTION INFILTRATION; PERINEURAL
Status: DISCONTINUED | OUTPATIENT
Start: 2021-09-14 | End: 2021-09-14 | Stop reason: HOSPADM

## 2021-09-14 RX ADMIN — SODIUM BICARBONATE: 84 INJECTION, SOLUTION INTRAVENOUS at 10:29

## 2021-09-14 NOTE — OP NOTE
OPERATIVE REPORT  PATIENT NAME: Joaquin Hagen  :  1955  MRN: [id number]  Pt Location: [facility] OR    SURGERY DATE: 21    Surgeon(s) and Role:     * Cierra Hamm MD - Primary     * Candelario Leach PA-C - Assisting    Pre-Op Diagnosis:  Trigger finger of left thumb [M65 312]    Post-Op Diagnosis:   Trigger finger of left thumb [M65 312]    Procedure(s) (LRB):  Left thumb trigger finger release (Left)    Specimen(s):  * No orders in the log *    Estimated Blood Loss:   Minimal      Anesthesia Type:   Local    Operative Indications: The patient has a history of a left trigger thumb that was recalcitrant to conservative management  The decision was made to bring the patient to the operating room for a left trigger thumb release  Risks of the procedure were explained which include, but are not limited to bleeding; infection; damage to nerves, arteries,veins, tendons; scar; pain; need for reoperation; failure to give desired result; and risks of anaesthesia  All questions were answered to satisfaction and they were willing to proceed  Operative Findings:  Left trigger thumb    Complications:   None    Procedure and Technique:  After the patient, site, and procedure were identified, the patient was brought into the operating room in a supine position  Local anaesthesia was adminstered in the preoperative holding area  A tourniquet was not used  The  left upper extremity was then prepped and drapped in a normal, sterile, orthopedic fashion  After the patient, site, and procedure were once again identified, attention was turned to the left thumb  An incision was made over the flexor tendon sheath at the level of the A1 pulley  Dissection was carried out in-line with the flexor tendon sheath and the radial and ulnar digital artery and nerve were protected  The A1 pulley was identified at the base of the incision    Under direct visualization, the A1 pulley was divided along the midline in its entirety with care taken to avoid injury to the underlying tendon  The tendons were examined to ensure that no further catching, popping, clicking or locking occurred with motion of the finger  At the completion of the procedure, hemostasis was obtained with cautery and direct pressure  The wounds were copiously irrigated with sterile solution  The wounds were closed with Prolene  Sterile dressings were applied, including Xeroform, gauze, tweeners, webril, ACE  Please note, all sponge, needle, and instrument counts were correct prior to closure  Loupe magnification was utilized  The patient tolerated the procedure well       I was present for all critical portions of the procedure, A qualified resident physician was not available and A physician assistant was required during the procedure for retraction tissue handling,dissection and suturing    Patient Disposition:  APU and hemodynamically stable    SIGNATURE: Ana Mccloud MD  DATE: 09/14/21  TIME: 11:09 AM

## 2021-09-14 NOTE — DISCHARGE INSTRUCTIONS
"pregnant        Passed - No positive pregnancy on file in past 12 months           Last office visit 6/12/20.  Warfarin dosing is managed by INR Clinic.  Prescription approved per [facility] Refill Protocol.  Bertha Gilbert RN    " Post Operative Instructions    You have had surgery on your arm today, please read and follow the information below:  · Elevate your hand above your elbow during the next 24-48 hours to help with swelling  · Place your hand and arm over your head with motion at your shoulder three times a day  · Do not apply any cream/ointment/oil to your incisions including antibiotics  · Do not soak your hands in standing water (dishwater, tubs, Jacuzzi's, pools, etc ) until given permission (typically 2-3 weeks after injury)    Call the office if you notice any:  · Increased numbness or tingling of your hand or fingers that is not relieved with elevation  · Increasing pain that is not controlled with medication  · Difficulty chewing, breathing, swallowing  · Chest pains or shortness of breath  · Fever over 101 4 degrees  Bandage: Remove bandage after 5 days  Motion: Move fingers into a fist 5 times a day, DO NOT move any splinted fingers  Weight bearing status: Avoid heavy lifting (>5 pounds) with the extremity that was operated on until follow up appointment  Normal activities of daily living are OK  Ice: Ice for 10 minutes every hour as needed for swelling x 24 hours  Sling: No sling necessary  Medications:   Naproxen 220 mg two times a day   Tylenol Extended Release 650 mg every 8 hours  Norco/Hydrocodone one tab every 6 hours AS NEEDED for pain     Follow-up Appointment: 7-10 days  Please call the office if you have any questions or concerns regarding your post-operative care

## 2021-09-14 NOTE — H&P
H&P Exam - Orthopedics   Tony Parekh 61 y o  female MRN: [id number]  Unit/Bed#: P303 A PRE    Assessment/Plan   Assessment:  Left Trigger thumb    Plan:  Left Trigger Thumb Release    History of Present Illness   HPI:  Tony Parekh is a 61 y o  female who presents with popping and locking of her left thumb  Patient has not responded to non-operative intervention  Patient would like to move forward with non-operative intervention  Historical Information  Review Of Systems:   · Skin: Normal  · Neuro: See HPI  · Musculoskeletal: See HPI  · 14 point review of systems negative except as stated above     Past Medical History:   Past Medical History:   Diagnosis Date    Cervical ca [facility]) 1979    Chronic bilateral low back pain 2018    Depression     Depression     Depression     Hyperlipidemia     Hyperlipidemia     Iron deficiency anemia     Laceration of left thumb 10/21/2020    Low back pain     Lumbar radiculopathy 2019    S/p MIS Navigated L3-4 laminectomy/decompression (right approach) with transforaminal lumbar interbody fusion Dr Princess Sanford 19      Meralgia paresthetica     Sciatica     right side    Spinal stenosis of lumbar region with neurogenic claudication 2019    Spondylolisthesis of lumbar region 3/8/2019    Stroke [facility])     embolic    Synovial cyst of lumbar facet joint 2019       Past Surgical History:   Past Surgical History:   Procedure Laterality Date     SECTION      x2   ,     COLONOSCOPY      HYSTEROSCOPY      FL ARTHDSIS POST/POSTEROLATRL/POSTINTERBODY LUMBAR N/A 2019    Procedure: MIS Navigated L3-4 laminectomy/decompression (right approach) with transforaminal lumbar interbody fusion;  Surgeon: Truong Ruth MD;  Location: [facility];  Service: Neurosurgery    FL COLONOSCOPY FLX DX W/COLLJ [facility] WHEN PFRMD N/A 2017    Procedure: COLONOSCOPY;  Surgeon: Fady Alexandra DO;  Location: [facility] GI LAB;   Service: Gastroenterology    TOE SURGERY Right 07/2021    big toe stiched and broken    TUBAL LIGATION         Family History:  Family history reviewed and non-contributory  Family History   Problem Relation Age of Onset    Hyperlipidemia Mother     Heart disease Mother 64        alive in [de-identified]    Stroke Mother     Hypertension Family     Stroke Family     Depression Sister     Hypertension Sister     Cancer Maternal Grandfather        Social History:  Social History     Socioeconomic History    Marital status:      Spouse name: Not on file    Number of children: 2    Years of education: Bachelors degree    Highest education level: Not on file   Occupational History    Occupation:    Tobacco Use    Smoking status: Never Smoker    Smokeless tobacco: Never Used   Vaping Use    Vaping Use: Never used   Substance and Sexual Activity    Alcohol use: Not Currently    Drug use: Never    Sexual activity: Yes     Birth control/protection: Post-menopausal   Other Topics Concern    Not on file   Social History Narrative    Not on file     Social Determinants of Health     Financial Resource Strain:     Difficulty of Paying Living Expenses:    Food Insecurity:     Worried About Running Out of Food in the Last Year:     [address] in the Last Year:    Transportation Needs:     Lack of Transportation (Medical):      Lack of Transportation (Non-Medical):    Physical Activity:     Days of Exercise per Week:     Minutes of Exercise per Session:    Stress:     Feeling of Stress :    Social Connections:     Frequency of Communication with Friends and Family:     Frequency of Social Gatherings with Friends and Family:     Attends Mandaen Services:     Active Member of Clubs or Organizations:     Attends Club or Organization Meetings:     Marital Status:    Intimate Partner Violence:     Fear of Current or Ex-Partner:     Emotionally Abused:     Physically Abused:     Sexually Abused: Allergies: Allergies   Allergen Reactions    Paxil [Paroxetine] Rash           Labs:  0   Lab Value Date/Time    HCT 42 7 06/22/2020 0856    HCT 30 9 (L) 04/05/2019 0600    HCT 27 3 (L) 04/04/2019 0509    HGB 13 5 06/22/2020 0856    HGB 9 9 (L) 04/05/2019 0600    HGB 8 7 (L) 04/04/2019 0509    INR 1 00 03/15/2019 0714    WBC 5 33 06/22/2020 0856    WBC 8 16 04/05/2019 0600    WBC 7 58 04/04/2019 0509       Meds:  No current facility-administered medications for this encounter  Blood Culture:   No results found for: BLOODCX    Wound Culture:   No results found for: WOUNDCULT    Ins and Outs:  No intake/output data recorded  Physical Exam  There were no vitals taken for this visit  There were no vitals taken for this visit  Gen: Alert and oriented to person, place, time  HEENT: EOMI, eyes clear, moist mucus membranes, hearing intact  Respiratory: Bilateral chest rise  No audible wheezing found  Cardiovascular: Regular Rate and Rhythm  Abdomen: soft nontender/nondistended  Ortho Exam: Popping and locking left thumb   Tenderness over A1 pulley  Neuro Exam: sensation intact    Lab Results: Reviewed  Imaging: Reviewed

## 2021-09-20 ENCOUNTER — TELEPHONE (OUTPATIENT)
Dept: OBGYN CLINIC | Facility: HOSPITAL | Age: 66
End: 2021-09-20

## 2021-09-20 NOTE — TELEPHONE ENCOUNTER
Patient is calling because she had sx on 9/14/21 & is calling today to report numbness on the face of her hand  Her fingers are fine  Numbness is metacarpal area & does not run up to the fingers at all  Patient removed the wrapping yesterday        cb [phone]

## 2021-09-20 NOTE — TELEPHONE ENCOUNTER
Patient reports some numbness on the dorsal aspect of the hand  Stated it does not extend past the wrist or the knuckles to the fingers  Denies discoloration to the hand  Stated she can move all fingers, hand is normal color and temp  Stated this has been since surgery  Dressings have already been remove d Patient is not taking any medications for pain at this time as she has no pain, just this numbness  Advised that sometimes the nerves can be irritated or inflamed in the surgical area and cause some numbness in the hand  This should resolve  Encouraged call back if anything changes or new symptoms develop  Understanding verbalized

## 2021-09-27 ENCOUNTER — OFFICE VISIT (OUTPATIENT)
Dept: OBGYN CLINIC | Facility: CLINIC | Age: 66
End: 2021-09-27

## 2021-09-27 VITALS
HEIGHT: 63 IN | SYSTOLIC BLOOD PRESSURE: 141 MMHG | WEIGHT: 125 LBS | BODY MASS INDEX: 22.15 KG/M2 | DIASTOLIC BLOOD PRESSURE: 90 MMHG

## 2021-09-27 DIAGNOSIS — G56.30 RADIAL TUNNEL SYNDROME: Primary | ICD-10-CM

## 2021-09-27 PROCEDURE — 99024 POSTOP FOLLOW-UP VISIT: CPT | Performed by: ORTHOPAEDIC SURGERY

## 2021-09-27 PROCEDURE — 3008F BODY MASS INDEX DOCD: CPT | Performed by: PODIATRIST

## 2021-09-27 NOTE — PROGRESS NOTES
Assessment:   S/P Left thumb trigger finger release - Left on 9/14/2021      Left radial tunnel syndrome    Plan:   Resume activities as tolerated - in regards to TFR   recommend hand therapy to work on nerve gliding activities for the radial tunnel syndrome  Follow Up:  2 months if needed    To Do Next Visit:  Reassess current conditions      CHIEF COMPLAINT:  Chief Complaint   Patient presents with    Left Thumb - Post-op, Suture / Staple Removal     S/P Left thumb trigger finger release DOS 9/14/21         SUBJECTIVE:  Huyen Gardner is a 61 y o  female who presents for follow up after Left thumb trigger finger release - Left on 9/14/2021  Today patient has mild post operative soreness at the surgical site  Patient states that the biggest complaint that she has is numbness and tingling on the back of her hand  She states that it started after operative intervention  PHYSICAL EXAMINATION:  Vital signs: /90   Ht 5' 3" (1 6 m)   Wt 56 7 kg (125 lb)   BMI 22 14 kg/m²   General: well developed and well nourished, alert, oriented times 3 and appears comfortable  Psychiatric: Normal    MUSCULOSKELETAL EXAMINATION:  Incision: Clean, dry, intact  Range of Motion: As expected  Neurovascular status: Neuro intact, good cap refill  Activity Restrictions: No restrictions  Done today: Sutures out and Steri strips applied     LUE:  5/5 wrist flexion and extension   Tenderness over radial tunnel, no tenderness over wartenberg   Altered sensation - Superficial sensor branch of the radial nerve distribution       STUDIES REVIEWED:  No Studies to review      PROCEDURES PERFORMED:  Procedures  No Procedures performed today    Scribe Attestation    I,:  Taj Huang PA-C am acting as a scribe while in the presence of the attending physician :       I,:  Keya Barnes MD personally performed the services described in this documentation    as scribed in my presence :           Scribe Attestation    I,: Amrit Liu PA-C am acting as a scribe while in the presence of the attending physician :       I,:  Ortiz Ng MD personally performed the services described in this documentation    as scribed in my presence :

## 2021-10-11 ENCOUNTER — EVALUATION (OUTPATIENT)
Dept: OCCUPATIONAL THERAPY | Facility: CLINIC | Age: 66
End: 2021-10-11
Payer: COMMERCIAL

## 2021-10-11 ENCOUNTER — TELEPHONE (OUTPATIENT)
Dept: OBGYN CLINIC | Facility: CLINIC | Age: 66
End: 2021-10-11

## 2021-10-11 ENCOUNTER — CONSULT (OUTPATIENT)
Dept: PAIN MEDICINE | Facility: CLINIC | Age: 66
End: 2021-10-11
Payer: COMMERCIAL

## 2021-10-11 VITALS
WEIGHT: 127 LBS | HEIGHT: 63 IN | HEART RATE: 49 BPM | BODY MASS INDEX: 22.5 KG/M2 | SYSTOLIC BLOOD PRESSURE: 128 MMHG | DIASTOLIC BLOOD PRESSURE: 82 MMHG

## 2021-10-11 DIAGNOSIS — M19.012 PRIMARY OSTEOARTHRITIS OF LEFT SHOULDER: ICD-10-CM

## 2021-10-11 DIAGNOSIS — M48.02 CERVICAL SPINAL STENOSIS: ICD-10-CM

## 2021-10-11 DIAGNOSIS — N95.2 ATROPHIC VAGINITIS: ICD-10-CM

## 2021-10-11 DIAGNOSIS — G56.32 RADIAL TUNNEL SYNDROME, LEFT: Primary | ICD-10-CM

## 2021-10-11 DIAGNOSIS — G89.29 CHRONIC LEFT SHOULDER PAIN: ICD-10-CM

## 2021-10-11 DIAGNOSIS — M25.512 CHRONIC LEFT SHOULDER PAIN: ICD-10-CM

## 2021-10-11 DIAGNOSIS — M54.12 CERVICAL RADICULOPATHY: ICD-10-CM

## 2021-10-11 DIAGNOSIS — M47.812 CERVICAL SPONDYLOSIS: Primary | ICD-10-CM

## 2021-10-11 PROCEDURE — 97140 MANUAL THERAPY 1/> REGIONS: CPT | Performed by: OCCUPATIONAL THERAPIST

## 2021-10-11 PROCEDURE — 99214 OFFICE O/P EST MOD 30 MIN: CPT | Performed by: ANESTHESIOLOGY

## 2021-10-11 PROCEDURE — 97165 OT EVAL LOW COMPLEX 30 MIN: CPT | Performed by: OCCUPATIONAL THERAPIST

## 2021-10-11 RX ORDER — IBUPROFEN 600 MG/1
600 TABLET ORAL EVERY 8 HOURS PRN
COMMUNITY
Start: 2021-07-28 | End: 2021-10-13 | Stop reason: ALTCHOICE

## 2021-10-11 RX ORDER — ESTRADIOL 0.1 MG/G
1 CREAM VAGINAL WEEKLY
Qty: 85 G | Refills: 2 | Status: SHIPPED | OUTPATIENT
Start: 2021-10-11 | End: 2021-10-11 | Stop reason: SDUPTHER

## 2021-10-12 RX ORDER — ESTRADIOL 0.1 MG/G
1 CREAM VAGINAL WEEKLY
Qty: 85 G | Refills: 2 | Status: SHIPPED | OUTPATIENT
Start: 2021-10-12

## 2021-10-13 ENCOUNTER — OFFICE VISIT (OUTPATIENT)
Dept: FAMILY MEDICINE CLINIC | Facility: CLINIC | Age: 66
End: 2021-10-13
Payer: COMMERCIAL

## 2021-10-13 VITALS
TEMPERATURE: 97.7 F | SYSTOLIC BLOOD PRESSURE: 152 MMHG | OXYGEN SATURATION: 98 % | WEIGHT: 128 LBS | BODY MASS INDEX: 22.68 KG/M2 | DIASTOLIC BLOOD PRESSURE: 80 MMHG | HEIGHT: 63 IN | HEART RATE: 63 BPM

## 2021-10-13 DIAGNOSIS — M54.12 CERVICAL RADICULOPATHY: ICD-10-CM

## 2021-10-13 DIAGNOSIS — G89.29 CHRONIC LEFT SHOULDER PAIN: ICD-10-CM

## 2021-10-13 DIAGNOSIS — Z87.440 HISTORY OF UTI: ICD-10-CM

## 2021-10-13 DIAGNOSIS — Z86.73 HISTORY OF CVA (CEREBROVASCULAR ACCIDENT): ICD-10-CM

## 2021-10-13 DIAGNOSIS — Z23 NEED FOR INFLUENZA VACCINATION: ICD-10-CM

## 2021-10-13 DIAGNOSIS — M19.012 PRIMARY OSTEOARTHRITIS OF LEFT SHOULDER: ICD-10-CM

## 2021-10-13 DIAGNOSIS — M25.512 CHRONIC LEFT SHOULDER PAIN: ICD-10-CM

## 2021-10-13 DIAGNOSIS — K12.0 APHTHOUS ULCER: ICD-10-CM

## 2021-10-13 DIAGNOSIS — E78.00 PURE HYPERCHOLESTEROLEMIA: Primary | ICD-10-CM

## 2021-10-13 DIAGNOSIS — D50.9 IRON DEFICIENCY ANEMIA, UNSPECIFIED IRON DEFICIENCY ANEMIA TYPE: ICD-10-CM

## 2021-10-13 PROBLEM — M65.312 TRIGGER FINGER OF LEFT THUMB: Status: RESOLVED | Noted: 2020-04-30 | Resolved: 2021-10-13

## 2021-10-13 PROCEDURE — 90682 RIV4 VACC RECOMBINANT DNA IM: CPT

## 2021-10-13 PROCEDURE — 99214 OFFICE O/P EST MOD 30 MIN: CPT | Performed by: FAMILY MEDICINE

## 2021-10-13 PROCEDURE — 1036F TOBACCO NON-USER: CPT | Performed by: FAMILY MEDICINE

## 2021-10-13 PROCEDURE — 90471 IMMUNIZATION ADMIN: CPT

## 2021-10-13 PROCEDURE — 3725F SCREEN DEPRESSION PERFORMED: CPT | Performed by: FAMILY MEDICINE

## 2021-10-13 RX ORDER — PHENAZOPYRIDINE HYDROCHLORIDE 200 MG/1
200 TABLET, FILM COATED ORAL
Qty: 21 TABLET | Refills: 0 | Status: SHIPPED | OUTPATIENT
Start: 2021-10-13 | End: 2022-06-08 | Stop reason: ALTCHOICE

## 2021-10-13 RX ORDER — CIPROFLOXACIN 500 MG/1
500 TABLET, FILM COATED ORAL EVERY 12 HOURS SCHEDULED
Qty: 14 TABLET | Refills: 0 | Status: SHIPPED | OUTPATIENT
Start: 2021-10-13 | End: 2021-10-20

## 2021-10-13 RX ORDER — TRIAMCINOLONE ACETONIDE 0.1 %
PASTE (GRAM) DENTAL
Qty: 5 G | Refills: 5 | Status: SHIPPED | OUTPATIENT
Start: 2021-10-13

## 2021-10-14 ENCOUNTER — PROCEDURE VISIT (OUTPATIENT)
Dept: PAIN MEDICINE | Facility: CLINIC | Age: 66
End: 2021-10-14
Payer: COMMERCIAL

## 2021-10-14 VITALS
HEART RATE: 53 BPM | BODY MASS INDEX: 22.5 KG/M2 | DIASTOLIC BLOOD PRESSURE: 87 MMHG | SYSTOLIC BLOOD PRESSURE: 144 MMHG | HEIGHT: 63 IN | WEIGHT: 127 LBS

## 2021-10-14 DIAGNOSIS — G89.29 CHRONIC LEFT SHOULDER PAIN: Primary | ICD-10-CM

## 2021-10-14 DIAGNOSIS — M25.512 CHRONIC LEFT SHOULDER PAIN: Primary | ICD-10-CM

## 2021-10-14 PROCEDURE — 3008F BODY MASS INDEX DOCD: CPT | Performed by: FAMILY MEDICINE

## 2021-10-14 PROCEDURE — 20611 DRAIN/INJ JOINT/BURSA W/US: CPT | Performed by: ANESTHESIOLOGY

## 2021-10-14 RX ORDER — BUPIVACAINE HYDROCHLORIDE 2.5 MG/ML
10 INJECTION, SOLUTION EPIDURAL; INFILTRATION; INTRACAUDAL ONCE
Status: COMPLETED | OUTPATIENT
Start: 2021-10-14 | End: 2021-10-14

## 2021-10-14 RX ORDER — METHYLPREDNISOLONE ACETATE 40 MG/ML
40 INJECTION, SUSPENSION INTRA-ARTICULAR; INTRALESIONAL; INTRAMUSCULAR; SOFT TISSUE ONCE
Status: COMPLETED | OUTPATIENT
Start: 2021-10-14 | End: 2021-10-14

## 2021-10-14 RX ORDER — METHYLPREDNISOLONE ACETATE 40 MG/ML
40 INJECTION, SUSPENSION INTRA-ARTICULAR; INTRALESIONAL; INTRAMUSCULAR; SOFT TISSUE ONCE
Status: DISCONTINUED | OUTPATIENT
Start: 2021-10-14 | End: 2022-06-08

## 2021-10-14 RX ADMIN — METHYLPREDNISOLONE ACETATE 40 MG: 40 INJECTION, SUSPENSION INTRA-ARTICULAR; INTRALESIONAL; INTRAMUSCULAR; SOFT TISSUE at 10:24

## 2021-10-14 RX ADMIN — BUPIVACAINE HYDROCHLORIDE 10 ML: 2.5 INJECTION, SOLUTION EPIDURAL; INFILTRATION; INTRACAUDAL at 10:55

## 2021-10-21 ENCOUNTER — TELEPHONE (OUTPATIENT)
Dept: PODIATRY | Facility: CLINIC | Age: 66
End: 2021-10-21

## 2022-06-07 ENCOUNTER — APPOINTMENT (OUTPATIENT)
Dept: LAB | Facility: MEDICAL CENTER | Age: 67
End: 2022-06-07
Payer: MEDICARE

## 2022-06-07 DIAGNOSIS — E78.00 PURE HYPERCHOLESTEROLEMIA: ICD-10-CM

## 2022-06-07 DIAGNOSIS — I63.9 CEREBROVASCULAR ACCIDENT (CVA), UNSPECIFIED MECHANISM (HCC): ICD-10-CM

## 2022-06-07 LAB
ALBUMIN SERPL BCP-MCNC: 3.7 G/DL (ref 3.5–5)
ALP SERPL-CCNC: 94 U/L (ref 46–116)
ALT SERPL W P-5'-P-CCNC: 21 U/L (ref 12–78)
ANION GAP SERPL CALCULATED.3IONS-SCNC: 7 MMOL/L (ref 4–13)
AST SERPL W P-5'-P-CCNC: 13 U/L (ref 5–45)
BASOPHILS # BLD AUTO: 0.08 THOUSANDS/ΜL (ref 0–0.1)
BASOPHILS NFR BLD AUTO: 1 % (ref 0–1)
BILIRUB SERPL-MCNC: 0.61 MG/DL (ref 0.2–1)
BUN SERPL-MCNC: 14 MG/DL (ref 5–25)
CALCIUM SERPL-MCNC: 9.1 MG/DL (ref 8.3–10.1)
CHLORIDE SERPL-SCNC: 108 MMOL/L (ref 100–108)
CHOLEST SERPL-MCNC: 304 MG/DL
CO2 SERPL-SCNC: 26 MMOL/L (ref 21–32)
CREAT SERPL-MCNC: 1.02 MG/DL (ref 0.6–1.3)
EOSINOPHIL # BLD AUTO: 0.14 THOUSAND/ΜL (ref 0–0.61)
EOSINOPHIL NFR BLD AUTO: 2 % (ref 0–6)
ERYTHROCYTE [DISTWIDTH] IN BLOOD BY AUTOMATED COUNT: 13.8 % (ref 11.6–15.1)
GFR SERPL CREATININE-BSD FRML MDRD: 57 ML/MIN/1.73SQ M
GLUCOSE P FAST SERPL-MCNC: 89 MG/DL (ref 65–99)
HCT VFR BLD AUTO: 42.7 % (ref 34.8–46.1)
HDLC SERPL-MCNC: 57 MG/DL
HGB BLD-MCNC: 13.9 G/DL (ref 11.5–15.4)
IMM GRANULOCYTES # BLD AUTO: 0.03 THOUSAND/UL (ref 0–0.2)
IMM GRANULOCYTES NFR BLD AUTO: 1 % (ref 0–2)
LDLC SERPL CALC-MCNC: 192 MG/DL (ref 0–100)
LYMPHOCYTES # BLD AUTO: 2.61 THOUSANDS/ΜL (ref 0.6–4.47)
LYMPHOCYTES NFR BLD AUTO: 43 % (ref 14–44)
MCH RBC QN AUTO: 30.3 PG (ref 26.8–34.3)
MCHC RBC AUTO-ENTMCNC: 32.6 G/DL (ref 31.4–37.4)
MCV RBC AUTO: 93 FL (ref 82–98)
MONOCYTES # BLD AUTO: 0.5 THOUSAND/ΜL (ref 0.17–1.22)
MONOCYTES NFR BLD AUTO: 8 % (ref 4–12)
NEUTROPHILS # BLD AUTO: 2.71 THOUSANDS/ΜL (ref 1.85–7.62)
NEUTS SEG NFR BLD AUTO: 45 % (ref 43–75)
NRBC BLD AUTO-RTO: 0 /100 WBCS
PLATELET # BLD AUTO: 268 THOUSANDS/UL (ref 149–390)
PMV BLD AUTO: 10.8 FL (ref 8.9–12.7)
POTASSIUM SERPL-SCNC: 3.8 MMOL/L (ref 3.5–5.3)
PROT SERPL-MCNC: 7.4 G/DL (ref 6.4–8.2)
RBC # BLD AUTO: 4.58 MILLION/UL (ref 3.81–5.12)
SODIUM SERPL-SCNC: 141 MMOL/L (ref 136–145)
TRIGL SERPL-MCNC: 276 MG/DL
WBC # BLD AUTO: 6.07 THOUSAND/UL (ref 4.31–10.16)

## 2022-06-07 PROCEDURE — 36415 COLL VENOUS BLD VENIPUNCTURE: CPT

## 2022-06-07 PROCEDURE — 85025 COMPLETE CBC W/AUTO DIFF WBC: CPT

## 2022-06-07 PROCEDURE — 80053 COMPREHEN METABOLIC PANEL: CPT

## 2022-06-07 PROCEDURE — 80061 LIPID PANEL: CPT

## 2022-06-08 ENCOUNTER — OFFICE VISIT (OUTPATIENT)
Dept: FAMILY MEDICINE CLINIC | Facility: CLINIC | Age: 67
End: 2022-06-08
Payer: MEDICARE

## 2022-06-08 VITALS
HEART RATE: 60 BPM | HEIGHT: 63 IN | SYSTOLIC BLOOD PRESSURE: 120 MMHG | RESPIRATION RATE: 18 BRPM | DIASTOLIC BLOOD PRESSURE: 84 MMHG | OXYGEN SATURATION: 99 % | BODY MASS INDEX: 21.62 KG/M2 | WEIGHT: 122 LBS

## 2022-06-08 DIAGNOSIS — Z86.73 HISTORY OF CVA (CEREBROVASCULAR ACCIDENT): ICD-10-CM

## 2022-06-08 DIAGNOSIS — Z78.0 POST-MENOPAUSAL: ICD-10-CM

## 2022-06-08 DIAGNOSIS — Z11.4 SCREENING FOR HIV (HUMAN IMMUNODEFICIENCY VIRUS): ICD-10-CM

## 2022-06-08 DIAGNOSIS — Z11.59 NEED FOR HEPATITIS C SCREENING TEST: ICD-10-CM

## 2022-06-08 DIAGNOSIS — M48.02 CERVICAL SPINAL STENOSIS: ICD-10-CM

## 2022-06-08 DIAGNOSIS — Z12.31 ENCOUNTER FOR SCREENING MAMMOGRAM FOR BREAST CANCER: ICD-10-CM

## 2022-06-08 DIAGNOSIS — E78.00 PURE HYPERCHOLESTEROLEMIA: ICD-10-CM

## 2022-06-08 DIAGNOSIS — Z00.00 WELCOME TO MEDICARE PREVENTIVE VISIT: Primary | ICD-10-CM

## 2022-06-08 DIAGNOSIS — M54.12 CERVICAL RADICULOPATHY: ICD-10-CM

## 2022-06-08 DIAGNOSIS — D50.9 IRON DEFICIENCY ANEMIA, UNSPECIFIED IRON DEFICIENCY ANEMIA TYPE: ICD-10-CM

## 2022-06-08 PROCEDURE — G0402 INITIAL PREVENTIVE EXAM: HCPCS | Performed by: FAMILY MEDICINE

## 2022-06-08 PROCEDURE — 1123F ACP DISCUSS/DSCN MKR DOCD: CPT | Performed by: FAMILY MEDICINE

## 2022-06-08 PROCEDURE — G0403 EKG FOR INITIAL PREVENT EXAM: HCPCS | Performed by: FAMILY MEDICINE

## 2022-06-08 NOTE — ASSESSMENT & PLAN NOTE
She does have some intermittent pain radiating down both arms which seems consistent cervical radiculopathy  I did mention getting an EMG but she would like to hold off on that at this time

## 2022-06-08 NOTE — ASSESSMENT & PLAN NOTE
She does continue to get some occasional radicular symptoms down her arms    I did suggest consideration of her EMG if symptoms progress to make sure she does not have something else going on such as an ulnar neuropathy or carpal tunnel syndrome

## 2022-06-08 NOTE — PROGRESS NOTES
Assessment and Plan:     Problem List Items Addressed This Visit        Nervous and Auditory    Cervical radiculopathy     She does have some intermittent pain radiating down both arms which seems consistent cervical radiculopathy  I did mention getting an EMG but she would like to hold off on that at this time  Other    Cervical spinal stenosis     She does continue to get some occasional radicular symptoms down her arms  I did suggest consideration of her EMG if symptoms progress to make sure she does not have something else going on such as an ulnar neuropathy or carpal tunnel syndrome           Pure hypercholesterolemia     She does continue significant elevation of her cholesterol  She would like to hold off on statin therapy  She will look to modify her diet somewhat  I did give her a slip for fasting labs in about 6 months  Relevant Orders    Comprehensive metabolic panel    Lipid Panel with Direct LDL reflex    Iron deficiency anemia    Relevant Orders    Comprehensive metabolic panel    CBC and differential    History of CVA (cerebrovascular accident)      Other Visit Diagnoses     Welcome to Medicare preventive visit    -  Primary    Relevant Orders    POCT ECG (Completed)    Encounter for screening mammogram for breast cancer        Relevant Orders    Mammo screening bilateral w 3d & cad    Post-menopausal        Relevant Orders    DXA bone density spine hip and pelvis    Need for hepatitis C screening test        Relevant Orders    Hepatitis C Antibody ([facility])    Screening for HIV (human immunodeficiency virus)        Relevant Orders    HIV 1/2 Antigen/Antibody (4th Generation) w Reflex [facility]          Depression Screening and Follow-up Plan: Patient was screened for depression during today's encounter  They screened negative with a PHQ-2 score of 0        Preventive health issues were discussed with patient, and age appropriate screening tests were ordered as noted in patient's After Visit Summary  Personalized health advice and appropriate referrals for health education or preventive services given if needed, as noted in patient's After Visit Summary  History of Present Illness:     Patient presents for Welcome to Medicare visit  She is here today for Welcome to Medicare  Overall, she continues do extremely well  She is living in the [facility] in the winter time and recently sailed back up to [place]  Patient Care Team:  Lalita Hernadez MD as PCP - General  Autumn Bustos DO as Endoscopist     Review of Systems:     Review of Systems   Constitutional: Negative for appetite change, chills, fatigue, fever and unexpected weight change  HENT: Negative for trouble swallowing  Eyes: Negative for visual disturbance  Respiratory: Negative for cough, chest tightness, shortness of breath and wheezing  Cardiovascular: Negative for chest pain, palpitations and leg swelling  Gastrointestinal: Negative for abdominal distention, abdominal pain, blood in stool, constipation and diarrhea  Endocrine: Negative for polyuria  Genitourinary: Negative for difficulty urinating and flank pain  Musculoskeletal: Negative for arthralgias and myalgias  Skin: Negative for rash  Neurological: Negative for dizziness and light-headedness  Hematological: Negative for adenopathy  Does not bruise/bleed easily  Psychiatric/Behavioral: Negative for dysphoric mood and sleep disturbance  The patient is not nervous/anxious         Problem List:     Patient Active Problem List   Diagnosis    Cervical spinal stenosis    Female stress incontinence    Pure hypercholesterolemia    Iron deficiency anemia    History of CVA (cerebrovascular accident)    Dermatitis    History of UTI    Chronic left shoulder pain    Cervical radiculopathy    Primary osteoarthritis of left shoulder    Cervical spondylosis      Past Medical and Surgical History:     Past Medical History:   Diagnosis Date    Cervical ca [facility]) 1979    Chronic bilateral low back pain 2018    Depression     Depression     Depression     Hyperlipidemia     Hyperlipidemia     Iron deficiency anemia     Laceration of left thumb 10/21/2020    Low back pain     Lumbar radiculopathy 2019    S/p MIS Navigated L3-4 laminectomy/decompression (right approach) with transforaminal lumbar interbody fusion Dr Elieser Lancaster 19      Meralgia paresthetica     Sciatica     right side    Spinal stenosis of lumbar region with neurogenic claudication 2019    Spondylolisthesis of lumbar region 3/8/2019    Stroke [facility])     embolic    Synovial cyst of lumbar facet joint 2019    Trigger finger of left thumb 2020    Injected May 27, 2020, 2020  Past Surgical History:   Procedure Laterality Date     SECTION      x2   ,     COLONOSCOPY      HYSTEROSCOPY      TX ARTHDSIS POST/POSTEROLATRL/POSTINTERBODY LUMBAR N/A 2019    Procedure: MIS Navigated L3-4 laminectomy/decompression (right approach) with transforaminal lumbar interbody fusion;  Surgeon: Vanessa Gandhi MD;  [facility];  Service: Neurosurgery    TX COLONOSCOPY FLX DX W/[facility] WHEN PFRMD N/A 2017    Procedure: COLONOSCOPY;  Surgeon: Frankey Spillers, DO;  Location: [facility];   Service: Gastroenterology    TX INCISE FINGER TENDON SHEATH Left 2021    Procedure: Left thumb trigger finger release;  Surgeon: Dawson Shaw MD;  Location: [facility];  Service: Orthopedics    TOE SURGERY Right 2021    big toe stiched and broken    TUBAL LIGATION        Family History:     Family History   Problem Relation Age of Onset    Hyperlipidemia Mother     Heart disease Mother 64        alive in [de-identified]    Stroke Mother     Hypertension Family     Stroke Family     Depression Sister     Hypertension Sister     Cancer Maternal Grandfather       Social History: Social History     Socioeconomic History    Marital status:      Spouse name: None    Number of children: 2    Years of education: Bachelors degree    Highest education level: None   Occupational History    Occupation:    Tobacco Use    Smoking status: Never Smoker    Smokeless tobacco: Never Used   Vaping Use    Vaping Use: Never used   Substance and Sexual Activity    Alcohol use: Not Currently    Drug use: Never    Sexual activity: Yes     Birth control/protection: Post-menopausal   Other Topics Concern    None   Social History Narrative    None     Social Determinants of Health     Financial Resource Strain: Not on file   Food Insecurity: Not on file   Transportation Needs: Not on file   Physical Activity: Not on file   Stress: Not on file   Social Connections: Not on file   Intimate Partner Violence: Not on file   Housing Stability: Not on file      Medications and Allergies:     Current Outpatient Medications   Medication Sig Dispense Refill    aspirin 325 mg tablet Take 325 mg by mouth daily      estradiol (ESTRACE VAGINAL) 0 1 mg/g vaginal cream Insert 1 g into the vagina once a week Please dispense 2 tubes 42 5 gms each 85 g 2    multivitamin (THERAGRAN) TABS Take 1 tablet by mouth daily      triamcinolone (KENALOG) 0 1 % oral topical paste Flat to 4 times daily as needed for aphthous ulcer  5 g 5     No current facility-administered medications for this visit       Allergies   Allergen Reactions    Paxil [Paroxetine] Rash      Immunizations:     Immunization History   Administered Date(s) Administered    Hep A, adult 01/01/2004    Hep B, adult 12/01/2004, 01/01/2005, 07/01/2005    INFLUENZA 12/24/2015    Influenza Quadrivalent Preservative Free 3 years and older IM 10/20/2017    Influenza Quadrivalent, 6-35 Months IM 12/24/2015    Influenza, recombinant, quadrivalent,injectable, preservative free 11/23/2018, 10/28/2019, 10/07/2020, 10/13/2021    Influenza, seasonal, injectable 11/30/2011, 10/04/2013    Tdap 10/21/2020      Health Maintenance:         Topic Date Due    Hepatitis C Screening  Never done    HIV Screening  Never done    DXA SCAN  06/04/2015    Breast Cancer Screening: Mammogram  01/30/2020    Cervical Cancer Screening  08/31/2024    Colorectal Cancer Screening  08/07/2027         Topic Date Due    COVID-19 Vaccine (1) Never done    Pneumococcal Vaccine: 65+ Years (1 - PCV) Never done      Medicare Screening Tests and Risk Assessments:     Julianna Morrisoncherelle is here for her Welcome to Medicare visit  Health Risk Assessment:   Patient rates overall health as very good  Patient feels that their physical health rating is slightly better  Patient is satisfied with their life  Eyesight was rated as slightly worse  Hearing was rated as slightly worse  Patient feels that their emotional and mental health rating is same  Patients states they are never, rarely angry  Patient states they are never, rarely unusually tired/fatigued  Pain experienced in the last 7 days has been none  Patient states that she has experienced no weight loss or gain in last 6 months  Depression Screening:   PHQ-2 Score: 0      Fall Risk Screening: In the past year, patient has experienced: no history of falling in past year      Urinary Incontinence Screening:   Patient has not leaked urine accidently in the last six months  Home Safety:  Patient does not have trouble with stairs inside or outside of their home  Patient has working smoke alarms and has working carbon monoxide detector  Home safety hazards include: none  Nutrition:   Current diet is Regular  Medications:   Patient is not currently taking any over-the-counter supplements  Patient is able to manage medications       Activities of Daily Living (ADLs)/Instrumental Activities of Daily Living (IADLs):   Walk and transfer into and out of bed and chair?: Yes  Dress and groom yourself?: Yes    Bathe or shower yourself?: Yes    Feed yourself? Yes  Do your laundry/housekeeping?: Yes  Manage your money, pay your bills and track your expenses?: Yes  Make your own meals?: Yes    Do your own shopping?: Yes    Previous Hospitalizations:   Any hospitalizations or ED visits within the last 12 months?: No      Advance Care Planning:   Living will: No    Durable POA for healthcare: No    Advanced directive: No      PREVENTIVE SCREENINGS      Cardiovascular Screening:    General: Screening Not Indicated and History Lipid Disorder      Diabetes Screening:     General: Screening Current      Colorectal Cancer Screening:     General: Screening Current      Breast Cancer Screening:     General: Risks and Benefits Discussed    Due for: Mammogram        Cervical Cancer Screening:    General: History Cervical Cancer and Screening Current      Osteoporosis Screening:    General: Screening Current      Abdominal Aortic Aneurysm (AAA) Screening:        General: Screening Not Indicated      Lung Cancer Screening:     General: Screening Not Indicated      Hepatitis C Screening:    General: Risks and Benefits Discussed    Hep C Screening Accepted: Yes      Screening, Brief Intervention, and Referral to Treatment (SBIRT)    Screening      Single Item Drug Screening:  How often have you used an illegal drug (including marijuana) or a prescription medication for non-medical reasons in the past year? never    Single Item Drug Screen Score: 0  Interpretation: Negative screen for possible drug use disorder     Visual Acuity Screening    Right eye Left eye Both eyes   Without correction: 20/40 20/40 20/30   With correction:           Physical Exam:     /84 (BP Location: Left arm, Patient Position: Sitting, Cuff Size: Standard)   Pulse 60   Resp 18   Ht 5' 3" (1 6 m)   Wt 55 3 kg (122 lb)   SpO2 99%   BMI 21 61 kg/m²     Physical Exam  Constitutional:       General: She is not in acute distress  Appearance: She is well-developed  She is not diaphoretic  HENT:      Head: Normocephalic  Right Ear: External ear normal       Left Ear: External ear normal       Nose: Nose normal    Eyes:      General: No scleral icterus  Right eye: No discharge  Left eye: No discharge  Conjunctiva/sclera: Conjunctivae normal       Pupils: Pupils are equal, round, and reactive to light  Neck:      Thyroid: No thyromegaly  Trachea: No tracheal deviation  Cardiovascular:      Rate and Rhythm: Normal rate and regular rhythm  Heart sounds: Normal heart sounds  No murmur heard  Pulmonary:      Effort: Pulmonary effort is normal  No respiratory distress  Breath sounds: Normal breath sounds  Abdominal:      General: Bowel sounds are normal  There is no distension  Palpations: Abdomen is soft  Tenderness: There is no abdominal tenderness  Musculoskeletal:         General: No tenderness or deformity  Normal range of motion  Lymphadenopathy:      Cervical: No cervical adenopathy  Skin:     General: Skin is warm  Coloration: Skin is not pale  Findings: No erythema or rash  Neurological:      Cranial Nerves: No cranial nerve deficit        Coordination: Coordination normal    Psychiatric:         Behavior: Behavior normal           Ryan Reina MD

## 2022-06-08 NOTE — ASSESSMENT & PLAN NOTE
She does continue significant elevation of her cholesterol  She would like to hold off on statin therapy  She will look to modify her diet somewhat  I did give her a slip for fasting labs in about 6 months

## 2022-06-08 NOTE — PATIENT INSTRUCTIONS
Pure hypercholesterolemia  She does continue significant elevation of her cholesterol  She would like to hold off on statin therapy  She will look to modify her diet somewhat  I did give her a slip for fasting labs in about 6 months  Cervical spinal stenosis  She does continue to get some occasional radicular symptoms down her arms    I did suggest consideration of her EMG if symptoms progress to make sure she does not have something else going on such as an ulnar neuropathy or carpal tunnel syndrome

## 2022-06-09 ENCOUNTER — HOSPITAL ENCOUNTER (OUTPATIENT)
Dept: MAMMOGRAPHY | Facility: MEDICAL CENTER | Age: 67
Discharge: HOME | End: 2022-06-09
Payer: MEDICARE

## 2022-06-09 VITALS — WEIGHT: 121.91 LBS | HEIGHT: 63 IN | BODY MASS INDEX: 21.6 KG/M2

## 2022-06-09 DIAGNOSIS — Z12.31 ENCOUNTER FOR SCREENING MAMMOGRAM FOR BREAST CANCER: ICD-10-CM

## 2022-06-09 PROCEDURE — 77063 BREAST TOMOSYNTHESIS BI: CPT

## 2022-06-09 PROCEDURE — 77067 SCR MAMMO BI INCL CAD: CPT

## 2022-09-14 ENCOUNTER — ANNUAL EXAM (OUTPATIENT)
Dept: GYNECOLOGY | Facility: CLINIC | Age: 67
End: 2022-09-14
Payer: MEDICARE

## 2022-09-14 VITALS
BODY MASS INDEX: 21.26 KG/M2 | HEIGHT: 63 IN | SYSTOLIC BLOOD PRESSURE: 108 MMHG | HEART RATE: 52 BPM | DIASTOLIC BLOOD PRESSURE: 62 MMHG | WEIGHT: 120 LBS

## 2022-09-14 DIAGNOSIS — Z01.419 ENCOUNTER FOR GYNECOLOGICAL EXAMINATION WITH PAPANICOLAOU SMEAR OF CERVIX: ICD-10-CM

## 2022-09-14 DIAGNOSIS — Z12.11 SCREENING FOR COLON CANCER: ICD-10-CM

## 2022-09-14 DIAGNOSIS — Z01.419 ENCOUNTER FOR GYNECOLOGICAL EXAMINATION WITHOUT ABNORMAL FINDING: Primary | ICD-10-CM

## 2022-09-14 DIAGNOSIS — N95.2 ATROPHIC VAGINITIS: ICD-10-CM

## 2022-09-14 PROCEDURE — G0145 SCR C/V CYTO,THINLAYER,RESCR: HCPCS | Performed by: OBSTETRICS & GYNECOLOGY

## 2022-09-14 PROCEDURE — G0101 CA SCREEN;PELVIC/BREAST EXAM: HCPCS | Performed by: OBSTETRICS & GYNECOLOGY

## 2022-09-14 RX ORDER — ESTRADIOL 0.1 MG/G
1 CREAM VAGINAL WEEKLY
Qty: 85 G | Refills: 2 | Status: SHIPPED | OUTPATIENT
Start: 2022-09-14

## 2022-09-14 NOTE — PROGRESS NOTES
Assessment/Plan:         Diagnoses and all orders for this visit:    Encounter for gynecological examination without abnormal finding    Atrophic vaginitis  -     estradiol (ESTRACE VAGINAL) 0 1 mg/g vaginal cream; Insert 1 g into the vagina once a week 1 gram vaginally twice weekly       Screening for colon cancer: Referred back to GI    Subjective:      Patient ID: Violet Fisher is a 74 y o  female  HPI  patient presents for annual examination  She offers no complaints  Denies any vaginal irritation, burning, discharge or vaginal bleeding  Denies any dysuria, hematuria urgency or urinary incontinence  No GI complaints  Colonoscopy August of 2017  Normal   Advised to repeat in 5 years secondary to family history     Osteoporosis screening via heel scan 2021:-0 7    The following portions of the patient's history were reviewed and updated as appropriate:   She  has a past medical history of Cervical ca ([facility] [address] ) (1979), Chronic bilateral low back pain (11/23/2018), Depression, Depression, Depression, Hyperlipidemia, Hyperlipidemia, Iron deficiency anemia, Laceration of left thumb (10/21/2020), Low back pain, Lumbar radiculopathy (1/28/2019), Meralgia paresthetica, Sciatica, Spinal stenosis of lumbar region with neurogenic claudication (1/28/2019), Spondylolisthesis of lumbar region (3/8/2019), Stroke [facility]), Synovial cyst of lumbar facet joint (1/28/2019), and Trigger finger of left thumb (4/30/2020)    She   Patient Active Problem List    Diagnosis Date Noted    Cervical radiculopathy 10/11/2021    Primary osteoarthritis of left shoulder 10/11/2021    Cervical spondylosis 10/11/2021    Chronic left shoulder pain 06/09/2021    History of UTI 10/21/2020    Dermatitis 05/27/2020    History of CVA (cerebrovascular accident) 02/12/2018    Cervical spinal stenosis 10/29/2013    Female stress incontinence 07/24/2012    Pure hypercholesterolemia 07/24/2012    Iron deficiency anemia 07/24/2012     She has a past surgical history that includes  section; Colonoscopy; Hysteroscopy; Tubal ligation; pr colonoscopy flx dx w/collj spec when pfrmd (N/A, 2017); pr arthdsis post/posterolatrl/postinterbody lumbar (N/A, 2019); Toe Surgery (Right, 2021); and pr incise finger tendon sheath (Left, 2021)  Her family history includes Cancer in her maternal grandfather; Depression in her sister; Heart disease (age of onset: 64) in her mother; Hyperlipidemia in her mother; Hypertension in her family and sister; Stroke in her family and mother  She  reports that she has never smoked  She has never used smokeless tobacco  She reports previous alcohol use  She reports that she does not use drugs  Current Outpatient Medications   Medication Sig Dispense Refill    aspirin 325 mg tablet Take 325 mg by mouth daily      multivitamin (THERAGRAN) TABS Take 1 tablet by mouth daily      triamcinolone (KENALOG) 0 1 % oral topical paste Flat to 4 times daily as needed for aphthous ulcer  5 g 5    estradiol (ESTRACE VAGINAL) 0 1 mg/g vaginal cream Insert 1 g into the vagina once a week 1 gram vaginally twice weekly 85 g 2     No current facility-administered medications for this visit  Current Outpatient Medications on File Prior to Visit   Medication Sig    aspirin 325 mg tablet Take 325 mg by mouth daily    multivitamin (THERAGRAN) TABS Take 1 tablet by mouth daily    triamcinolone (KENALOG) 0 1 % oral topical paste Flat to 4 times daily as needed for aphthous ulcer   [DISCONTINUED] estradiol (ESTRACE VAGINAL) 0 1 mg/g vaginal cream Insert 1 g into the vagina once a week Please dispense 2 tubes 42 5 gms each     No current facility-administered medications on file prior to visit  She is allergic to paxil [paroxetine]       Review of Systems   Constitutional: Negative  HENT: Negative for sore throat and trouble swallowing  Gastrointestinal: Negative  Genitourinary: Negative  Objective:      /62   Pulse (!) 52   Ht 5' 3" (1 6 m)   Wt 54 4 kg (120 lb)   BMI 21 26 kg/m²          Physical Exam  Vitals reviewed  Constitutional:       Appearance: Normal appearance  She is normal weight  Cardiovascular:      Rate and Rhythm: Normal rate and regular rhythm  Pulses: Normal pulses  Heart sounds: Normal heart sounds  No murmur heard  Pulmonary:      Effort: Pulmonary effort is normal  No respiratory distress  Breath sounds: Normal breath sounds  Chest:   Breasts:      Right: No swelling, bleeding, inverted nipple, mass, nipple discharge, skin change, tenderness, axillary adenopathy or supraclavicular adenopathy  Left: No swelling, bleeding, inverted nipple, mass, nipple discharge, skin change, tenderness, axillary adenopathy or supraclavicular adenopathy  Abdominal:      General: There is no distension  Palpations: Abdomen is soft  There is no mass  Tenderness: There is no abdominal tenderness  There is no guarding or rebound  Hernia: No hernia is present  There is no hernia in the left inguinal area or right inguinal area  Genitourinary:     General: Normal vulva  Labia:         Right: No rash, tenderness or lesion  Left: No rash, tenderness or lesion  Vagina: Normal       Cervix: Normal       Uterus: Normal        Adnexa:         Right: No mass, tenderness or fullness  Left: No mass or tenderness  Musculoskeletal:      Cervical back: Normal range of motion and neck supple  No tenderness  Lymphadenopathy:      Cervical: No cervical adenopathy  Upper Body:      Right upper body: No supraclavicular, axillary or pectoral adenopathy  Left upper body: No supraclavicular, axillary or pectoral adenopathy  Lower Body: No right inguinal adenopathy  No left inguinal adenopathy  Neurological:      Mental Status: She is alert

## 2022-09-23 LAB
LAB AP GYN PRIMARY INTERPRETATION: NORMAL
Lab: NORMAL

## 2022-10-19 ENCOUNTER — OFFICE VISIT (OUTPATIENT)
Dept: FAMILY MEDICINE CLINIC | Facility: CLINIC | Age: 67
End: 2022-10-19
Payer: MEDICARE

## 2022-10-19 VITALS
OXYGEN SATURATION: 97 % | BODY MASS INDEX: 22.32 KG/M2 | HEIGHT: 63 IN | HEART RATE: 54 BPM | DIASTOLIC BLOOD PRESSURE: 70 MMHG | SYSTOLIC BLOOD PRESSURE: 122 MMHG | WEIGHT: 126 LBS | RESPIRATION RATE: 12 BRPM

## 2022-10-19 DIAGNOSIS — Z86.73 HISTORY OF CVA (CEREBROVASCULAR ACCIDENT): ICD-10-CM

## 2022-10-19 DIAGNOSIS — Z23 NEED FOR INFLUENZA VACCINATION: Primary | ICD-10-CM

## 2022-10-19 DIAGNOSIS — M65.311 TRIGGER FINGER OF RIGHT THUMB: ICD-10-CM

## 2022-10-19 DIAGNOSIS — E78.00 PURE HYPERCHOLESTEROLEMIA: ICD-10-CM

## 2022-10-19 DIAGNOSIS — Z87.440 HISTORY OF UTI: ICD-10-CM

## 2022-10-19 DIAGNOSIS — K12.0 APHTHOUS ULCER: ICD-10-CM

## 2022-10-19 DIAGNOSIS — D50.9 IRON DEFICIENCY ANEMIA, UNSPECIFIED IRON DEFICIENCY ANEMIA TYPE: ICD-10-CM

## 2022-10-19 DIAGNOSIS — M19.012 PRIMARY OSTEOARTHRITIS OF LEFT SHOULDER: ICD-10-CM

## 2022-10-19 PROCEDURE — 90662 IIV NO PRSV INCREASED AG IM: CPT

## 2022-10-19 PROCEDURE — 99214 OFFICE O/P EST MOD 30 MIN: CPT | Performed by: FAMILY MEDICINE

## 2022-10-19 PROCEDURE — G0008 ADMIN INFLUENZA VIRUS VAC: HCPCS

## 2022-10-19 RX ORDER — TRIAMCINOLONE ACETONIDE 0.1 %
PASTE (GRAM) DENTAL
Qty: 5 G | Refills: 5 | Status: SHIPPED | OUTPATIENT
Start: 2022-10-19 | End: 2022-10-19 | Stop reason: SDUPTHER

## 2022-10-19 RX ORDER — PHENAZOPYRIDINE HYDROCHLORIDE 200 MG/1
200 TABLET, FILM COATED ORAL
Qty: 21 TABLET | Refills: 0 | Status: SHIPPED | OUTPATIENT
Start: 2022-10-19

## 2022-10-19 RX ORDER — CIPROFLOXACIN 500 MG/1
500 TABLET, FILM COATED ORAL EVERY 12 HOURS SCHEDULED
Qty: 20 TABLET | Refills: 1 | Status: SHIPPED | OUTPATIENT
Start: 2022-10-19 | End: 2022-10-29

## 2022-10-19 RX ORDER — PHENAZOPYRIDINE HYDROCHLORIDE 200 MG/1
200 TABLET, FILM COATED ORAL
Qty: 21 TABLET | Refills: 0 | Status: SHIPPED | OUTPATIENT
Start: 2022-10-19 | End: 2022-10-19 | Stop reason: SDUPTHER

## 2022-10-19 RX ORDER — TRIAMCINOLONE ACETONIDE 0.1 %
PASTE (GRAM) DENTAL
Qty: 5 G | Refills: 5 | Status: SHIPPED | OUTPATIENT
Start: 2022-10-19

## 2022-10-19 RX ORDER — CIPROFLOXACIN 500 MG/1
500 TABLET, FILM COATED ORAL EVERY 12 HOURS SCHEDULED
Qty: 20 TABLET | Refills: 1 | Status: SHIPPED | OUTPATIENT
Start: 2022-10-19 | End: 2022-10-19 | Stop reason: SDUPTHER

## 2022-10-19 NOTE — ASSESSMENT & PLAN NOTE
She has some symptoms consistent with impingement  This time, she is going to hold off on injection  I will see her back in June  We could does injected if it is becoming more bothersome

## 2022-10-19 NOTE — PROGRESS NOTES
Assessment/Plan:       Problem List Items Addressed This Visit        Musculoskeletal and Integument    Primary osteoarthritis of left shoulder     She has some symptoms consistent with impingement  This time, she is going to hold off on injection  I will see her back in June  We could does injected if it is becoming more bothersome  Trigger finger of right thumb     I will refer her to Orthopedics  Relevant Orders    Ambulatory referral to Hand Surgery       Genitourinary    History of UTI    Relevant Medications    ciprofloxacin (CIPRO) 500 mg tablet    phenazopyridine (PYRIDIUM) 200 mg tablet       Other    Pure hypercholesterolemia     Check fasting lipids prior to follow-up         Iron deficiency anemia     Check labs prior to follow-up         History of CVA (cerebrovascular accident)      Other Visit Diagnoses     Need for influenza vaccination    -  Primary    Relevant Orders    influenza vaccine, high-dose, PF 0 7 mL (FLUZONE HIGH-DOSE) (Completed)    Aphthous ulcer        Relevant Medications    triamcinolone (KENALOG) 0 1 % oral topical paste            Subjective:      Patient ID: Rene Parra is a 74 y o  female  HPI patient presents today for follow-up for chronic health issues  Overall, she is doing pretty well  She is having some pain in her left shoulder especially with abduction  She notes this is worse when she lies on her left side  She has history of UTI and is about to take her cell boat down to the [facility] and would like a prescription in case she should get 1 while traveling  She currently denies dysuria hematuria      Her chronic neck pain seems to be pretty stable at this time    The following portions of the patient's history were reviewed and updated as appropriate: allergies, current medications, past family history, past medical history, past social history, past surgical history and problem list       Current Outpatient Medications:   •  aspirin 325 mg tablet, Take 325 mg by mouth daily, Disp: , Rfl:   •  ciprofloxacin (CIPRO) 500 mg tablet, Take 1 tablet (500 mg total) by mouth every 12 (twelve) hours for 10 days, Disp: 20 tablet, Rfl: 1  •  estradiol (ESTRACE VAGINAL) 0 1 mg/g vaginal cream, Insert 1 g into the vagina once a week 1 gram vaginally twice weekly, Disp: 85 g, Rfl: 2  •  multivitamin (THERAGRAN) TABS, Take 1 tablet by mouth daily, Disp: , Rfl:   •  phenazopyridine (PYRIDIUM) 200 mg tablet, Take 1 tablet (200 mg total) by mouth 3 (three) times a day with meals, Disp: 21 tablet, Rfl: 0  •  triamcinolone (KENALOG) 0 1 % oral topical paste, Flat to 4 times daily as needed for aphthous ulcer , Disp: 5 g, Rfl: 5     Review of Systems   Constitutional: Negative for fatigue  HENT: Negative for trouble swallowing  Respiratory: Negative for chest tightness, shortness of breath and wheezing  Cardiovascular: Negative for chest pain, palpitations and leg swelling  Gastrointestinal: Negative for abdominal pain, constipation and diarrhea  Endocrine: Negative for polyuria  Genitourinary: Negative for difficulty urinating and flank pain  Musculoskeletal: Negative for arthralgias and myalgias  Skin: Negative for rash  Psychiatric/Behavioral: Negative for sleep disturbance  Objective:      /70 (BP Location: Left arm, Patient Position: Sitting, Cuff Size: Standard)   Pulse (!) 54   Resp 12   Ht 5' 3" (1 6 m)   Wt 57 2 kg (126 lb)   SpO2 97%   BMI 22 32 kg/m²          Physical Exam  Vitals reviewed  Constitutional:       General: She is not in acute distress  Appearance: She is well-developed  She is not diaphoretic  HENT:      Head: Normocephalic  Eyes:      General:         Right eye: No discharge  Left eye: No discharge  Pupils: Pupils are equal, round, and reactive to light  Neck:      Thyroid: No thyromegaly  Trachea: No tracheal deviation     Cardiovascular:      Rate and Rhythm: Normal rate and regular rhythm  Heart sounds: Normal heart sounds  No murmur heard  Pulmonary:      Effort: Pulmonary effort is normal  No respiratory distress  Breath sounds: No wheezing or rales  Abdominal:      General: There is no distension  Palpations: Abdomen is soft  Tenderness: There is no abdominal tenderness  Musculoskeletal:         General: Normal range of motion  Right lower leg: No edema  Left lower leg: No edema  Lymphadenopathy:      Cervical: No cervical adenopathy  Skin:     General: Skin is warm  Findings: No erythema  Neurological:      General: No focal deficit present  Mental Status: She is alert and oriented to person, place, and time  Gait: Gait normal    Psychiatric:         Thought Content:  Thought content normal          Judgment: Judgment normal            Silevrio Downs

## 2022-10-20 ENCOUNTER — TELEPHONE (OUTPATIENT)
Dept: OBGYN CLINIC | Facility: HOSPITAL | Age: 67
End: 2022-10-20

## 2023-06-05 ENCOUNTER — OFFICE VISIT (OUTPATIENT)
Dept: OBGYN CLINIC | Facility: CLINIC | Age: 68
End: 2023-06-05
Payer: MEDICARE

## 2023-06-05 VITALS
SYSTOLIC BLOOD PRESSURE: 130 MMHG | DIASTOLIC BLOOD PRESSURE: 80 MMHG | WEIGHT: 123.4 LBS | HEIGHT: 63 IN | BODY MASS INDEX: 21.86 KG/M2

## 2023-06-05 DIAGNOSIS — M65.311 TRIGGER FINGER OF RIGHT THUMB: Primary | ICD-10-CM

## 2023-06-05 PROCEDURE — 99214 OFFICE O/P EST MOD 30 MIN: CPT | Performed by: ORTHOPAEDIC SURGERY

## 2023-06-05 RX ORDER — METHYLPREDNISOLONE 4 MG/1
TABLET ORAL
Qty: 1 EACH | Refills: 0 | Status: SHIPPED | OUTPATIENT
Start: 2023-06-05

## 2023-06-05 NOTE — PROGRESS NOTES
ASSESSMENT/PLAN:    Assessment:   Trigger Finger  right  thumb    Plan:   Treatment options discussed with the patient  At this time, because the finger is no longer triggering, do not recommend any acute intervention  Recommend patient follow-up in 6 to 8 weeks for reevaluation  If her symptoms return, could consider injection versus trigger thumb release at that time  Medrol dosepak will also be provided to be used PRN if her triggering returns and she is not in town to seek treatment at that time  Follow Up:  2  month(s)    To Do Next Visit:       General Discussions:  Trigger FInger: The anatomy and physiology of trigger finger was discussed with the patient today in the office  Edema and increased contact pressure within the flexor tendons at the A1 pulley can cause pain, crepitation, and limitation of function  Treatment options include resting MP blocking splints to decrease edema, oral anti-inflammatory medications, home or formal therapy exercises, up to 2 steroid injections within the tendon sheath, or surgical release  While majority of patients do respond to conservative treatment, up to 20% may require surgical release  Operative Discussions:       _____________________________________________________  CHIEF COMPLAINT:  Chief Complaint   Patient presents with   • Right Thumb - Triggering, Locking         SUBJECTIVE:  Le Mcfarlane is a 79 y o  female who presents for evaluation of right thumb locking  Patient reports approximately 6 months ago her right thumb began locking  She reports she would wake up every day with the thumb stuck in flexion, requiring manual unlocking of the thumb  This was uncomfortable and painful for her  Pain is localized to the volar aspect of the thumb worse with palpation or activity, better with rest   No numbness or tingling  However, approximately 2 weeks ago she reports the locking stopped, and she no longer has any symptoms  She is RHD       PAST MEDICAL HISTORY:  Past Medical History:   Diagnosis Date   • Cervical ca [facility]) 1979   • Chronic bilateral low back pain 2018   • Depression    • Depression    • Depression    • Hyperlipidemia    • Hyperlipidemia    • Iron deficiency anemia    • Laceration of left thumb 10/21/2020   • Low back pain    • Lumbar radiculopathy 2019    S/p MIS Navigated L3-4 laminectomy/decompression (right approach) with transforaminal lumbar interbody fusion Dr Noman Quintana 19     • Meralgia paresthetica    • Sciatica     right side   • Spinal stenosis of lumbar region with neurogenic claudication 2019   • Spondylolisthesis of lumbar region 3/8/2019   • Stroke [facility])     embolic   • Synovial cyst of lumbar facet joint 2019   • Trigger finger of left thumb 2020    Injected May 27, 2020, 2020  PAST SURGICAL HISTORY:  Past Surgical History:   Procedure Laterality Date   •  SECTION      x2   ,    • COLONOSCOPY     • HYSTEROSCOPY     • VT ARTHRODESIS COMBINED TQ 1NTRSPC LUMBAR N/A 2019    Procedure: MIS Navigated L3-4 laminectomy/decompression (right approach) with transforaminal lumbar interbody fusion;  Surgeon: Margarito Hussein MD;  Location: [facility];  Service: Neurosurgery   • VT COLONOSCOPY FLX DX W/COLLJ SPEC WHEN PFRMD N/A 2017    Procedure: COLONOSCOPY;  Surgeon: Chaka Donaldson DO;  Location: [facility] GI LAB;   Service: Gastroenterology   • VT TENDON SHEATH INCISION Left 2021    Procedure: Left thumb trigger finger release;  Surgeon: Betina Jesus MD;  Location: [facility];  Service: Orthopedics   • TOE SURGERY Right 2021    big toe stiched and broken   • TRIGGER FINGER RELEASE Left 2021   • TUBAL LIGATION         FAMILY HISTORY:  Family History   Problem Relation Age of Onset   • Hyperlipidemia Mother    • Heart disease Mother 64        alive in [de-identified]   • Stroke Mother    • Depression Sister    • Hypertension Sister    • Cancer Maternal Grandfather    • Hypertension Family    • Stroke Family        SOCIAL HISTORY:  Social History     Tobacco Use   • Smoking status: Never   • Smokeless tobacco: Never   Vaping Use   • Vaping Use: Never used   Substance Use Topics   • Alcohol use: Not Currently   • Drug use: Never       MEDICATIONS:    Current Outpatient Medications:   •  aspirin 325 mg tablet, Take 325 mg by mouth daily, Disp: , Rfl:   •  estradiol (ESTRACE VAGINAL) 0 1 mg/g vaginal cream, Insert 1 g into the vagina once a week 1 gram vaginally twice weekly, Disp: 85 g, Rfl: 2  •  multivitamin (THERAGRAN) TABS, Take 1 tablet by mouth daily, Disp: , Rfl:   •  triamcinolone (KENALOG) 0 1 % oral topical paste, Flat to 4 times daily as needed for aphthous ulcer , Disp: 5 g, Rfl: 5    ALLERGIES:  Allergies   Allergen Reactions   • Paxil [Paroxetine] Rash       REVIEW OF SYSTEMS:  Pertinent items are noted in HPI  A comprehensive review of systems was negative  LABS:  HgA1c:   Lab Results   Component Value Date    HGBA1C 5 9 03/15/2019     BMP:   Lab Results   Component Value Date    BUN 14 06/07/2022    CALCIUM 9 1 06/07/2022     06/07/2022    CO2 26 06/07/2022    CREATININE 1 02 06/07/2022    K 3 8 06/07/2022       _____________________________________________________  PHYSICAL EXAMINATION:  Vital signs:  Wt 56 kg (123 lb 6 4 oz)   BMI 21 86 kg/m²   General: well developed and well nourished, alert, oriented times 3 and appears comfortable  Psychiatric: Normal  HEENT: Trachea Midline, No torticollis  Cardiovascular: No discernable arrhythmia  Pulmonary: No wheezing or stridor  Abdomen: No rebound or guarding  Extremities: No peripheral edema  Skin: No masses, erythema, lacerations, fluctation, ulcerations  Neurovascular: Sensation Intact to the Median, Ulnar, Radial Nerve, Motor Intact to the Median, Ulnar, Radial Nerve and Pulses Intact    MUSCULOSKELETAL EXAMINATION:  MSK right upper extremity  • Skin intact without erythema or ecchymosis  • No focal tenderness to palpation  • SILT M/R/U  • Motor intact FDP2, FDP5, FDI, EDC, APB  • Trigger Finger: Non-TTP over the A1 pulley of the thumb and Palpable nodule in  the flexor tendon of the thumb  • 2 sec cap refill and 2+ radial pulse      _____________________________________________________  STUDIES REVIEWED:  No Studies to review      PROCEDURES PERFORMED:  Procedures  No Procedures performed today

## 2023-06-06 ENCOUNTER — APPOINTMENT (OUTPATIENT)
Dept: LAB | Facility: MEDICAL CENTER | Age: 68
End: 2023-06-06
Payer: MEDICARE

## 2023-06-06 DIAGNOSIS — E78.00 PURE HYPERCHOLESTEROLEMIA: ICD-10-CM

## 2023-06-06 DIAGNOSIS — D50.9 IRON DEFICIENCY ANEMIA, UNSPECIFIED IRON DEFICIENCY ANEMIA TYPE: ICD-10-CM

## 2023-06-06 DIAGNOSIS — Z11.4 SCREENING FOR HIV (HUMAN IMMUNODEFICIENCY VIRUS): ICD-10-CM

## 2023-06-06 DIAGNOSIS — Z11.59 NEED FOR HEPATITIS C SCREENING TEST: ICD-10-CM

## 2023-06-06 LAB
ALBUMIN SERPL BCP-MCNC: 3.8 G/DL (ref 3.5–5)
ALP SERPL-CCNC: 92 U/L (ref 46–116)
ALT SERPL W P-5'-P-CCNC: 24 U/L (ref 12–78)
ANION GAP SERPL CALCULATED.3IONS-SCNC: 1 MMOL/L (ref 4–13)
AST SERPL W P-5'-P-CCNC: 21 U/L (ref 5–45)
BASOPHILS # BLD AUTO: 0.06 THOUSANDS/ÂΜL (ref 0–0.1)
BASOPHILS NFR BLD AUTO: 1 % (ref 0–1)
BILIRUB SERPL-MCNC: 0.49 MG/DL (ref 0.2–1)
BUN SERPL-MCNC: 15 MG/DL (ref 5–25)
CALCIUM SERPL-MCNC: 9.1 MG/DL (ref 8.3–10.1)
CHLORIDE SERPL-SCNC: 108 MMOL/L (ref 96–108)
CHOLEST SERPL-MCNC: 340 MG/DL
CO2 SERPL-SCNC: 28 MMOL/L (ref 21–32)
CREAT SERPL-MCNC: 1.02 MG/DL (ref 0.6–1.3)
EOSINOPHIL # BLD AUTO: 0.13 THOUSAND/ÂΜL (ref 0–0.61)
EOSINOPHIL NFR BLD AUTO: 2 % (ref 0–6)
ERYTHROCYTE [DISTWIDTH] IN BLOOD BY AUTOMATED COUNT: 14 % (ref 11.6–15.1)
GFR SERPL CREATININE-BSD FRML MDRD: 57 ML/MIN/1.73SQ M
GLUCOSE P FAST SERPL-MCNC: 94 MG/DL (ref 65–99)
HCT VFR BLD AUTO: 42.3 % (ref 34.8–46.1)
HCV AB SER QL: NORMAL
HDLC SERPL-MCNC: 79 MG/DL
HGB BLD-MCNC: 14.2 G/DL (ref 11.5–15.4)
HIV 1+2 AB+HIV1 P24 AG SERPL QL IA: NORMAL
HIV 2 AB SERPL QL IA: NORMAL
HIV1 AB SERPL QL IA: NORMAL
HIV1 P24 AG SERPL QL IA: NORMAL
IMM GRANULOCYTES # BLD AUTO: 0.03 THOUSAND/UL (ref 0–0.2)
IMM GRANULOCYTES NFR BLD AUTO: 1 % (ref 0–2)
LDLC SERPL CALC-MCNC: 236 MG/DL (ref 0–100)
LYMPHOCYTES # BLD AUTO: 2.18 THOUSANDS/ÂΜL (ref 0.6–4.47)
LYMPHOCYTES NFR BLD AUTO: 40 % (ref 14–44)
MCH RBC QN AUTO: 30.3 PG (ref 26.8–34.3)
MCHC RBC AUTO-ENTMCNC: 33.6 G/DL (ref 31.4–37.4)
MCV RBC AUTO: 90 FL (ref 82–98)
MONOCYTES # BLD AUTO: 0.34 THOUSAND/ÂΜL (ref 0.17–1.22)
MONOCYTES NFR BLD AUTO: 6 % (ref 4–12)
NEUTROPHILS # BLD AUTO: 2.66 THOUSANDS/ÂΜL (ref 1.85–7.62)
NEUTS SEG NFR BLD AUTO: 50 % (ref 43–75)
NRBC BLD AUTO-RTO: 0 /100 WBCS
PLATELET # BLD AUTO: 259 THOUSANDS/UL (ref 149–390)
PMV BLD AUTO: 10.8 FL (ref 8.9–12.7)
POTASSIUM SERPL-SCNC: 4.7 MMOL/L (ref 3.5–5.3)
PROT SERPL-MCNC: 7.5 G/DL (ref 6.4–8.4)
RBC # BLD AUTO: 4.68 MILLION/UL (ref 3.81–5.12)
SODIUM SERPL-SCNC: 137 MMOL/L (ref 135–147)
TRIGL SERPL-MCNC: 125 MG/DL
WBC # BLD AUTO: 5.4 THOUSAND/UL (ref 4.31–10.16)

## 2023-06-06 PROCEDURE — 85025 COMPLETE CBC W/AUTO DIFF WBC: CPT

## 2023-06-06 PROCEDURE — 80061 LIPID PANEL: CPT

## 2023-06-06 PROCEDURE — 36415 COLL VENOUS BLD VENIPUNCTURE: CPT

## 2023-06-06 PROCEDURE — 80053 COMPREHEN METABOLIC PANEL: CPT

## 2023-06-06 PROCEDURE — 86803 HEPATITIS C AB TEST: CPT

## 2023-06-06 PROCEDURE — 87389 HIV-1 AG W/HIV-1&-2 AB AG IA: CPT

## 2023-06-09 ENCOUNTER — OFFICE VISIT (OUTPATIENT)
Dept: FAMILY MEDICINE CLINIC | Facility: CLINIC | Age: 68
End: 2023-06-09
Payer: COMMERCIAL

## 2023-06-09 ENCOUNTER — APPOINTMENT (OUTPATIENT)
Dept: RADIOLOGY | Facility: MEDICAL CENTER | Age: 68
End: 2023-06-09
Payer: MEDICARE

## 2023-06-09 VITALS
DIASTOLIC BLOOD PRESSURE: 80 MMHG | BODY MASS INDEX: 21.83 KG/M2 | OXYGEN SATURATION: 99 % | HEIGHT: 63 IN | TEMPERATURE: 97.2 F | SYSTOLIC BLOOD PRESSURE: 112 MMHG | HEART RATE: 54 BPM | WEIGHT: 123.2 LBS

## 2023-06-09 DIAGNOSIS — M54.12 CERVICAL RADICULOPATHY: ICD-10-CM

## 2023-06-09 DIAGNOSIS — M25.552 LEFT HIP PAIN: ICD-10-CM

## 2023-06-09 DIAGNOSIS — Z86.73 HISTORY OF CVA (CEREBROVASCULAR ACCIDENT): ICD-10-CM

## 2023-06-09 DIAGNOSIS — M65.311 TRIGGER FINGER OF RIGHT THUMB: ICD-10-CM

## 2023-06-09 DIAGNOSIS — Z12.31 ENCOUNTER FOR SCREENING MAMMOGRAM FOR BREAST CANCER: ICD-10-CM

## 2023-06-09 DIAGNOSIS — N39.3 FEMALE STRESS INCONTINENCE: ICD-10-CM

## 2023-06-09 DIAGNOSIS — Z87.440 HISTORY OF UTI: ICD-10-CM

## 2023-06-09 DIAGNOSIS — N18.31 STAGE 3A CHRONIC KIDNEY DISEASE (HCC): ICD-10-CM

## 2023-06-09 DIAGNOSIS — Z00.00 MEDICARE ANNUAL WELLNESS VISIT, SUBSEQUENT: Primary | ICD-10-CM

## 2023-06-09 DIAGNOSIS — M48.02 CERVICAL SPINAL STENOSIS: ICD-10-CM

## 2023-06-09 DIAGNOSIS — E78.00 PURE HYPERCHOLESTEROLEMIA: ICD-10-CM

## 2023-06-09 DIAGNOSIS — Z86.2 HISTORY OF ANEMIA: ICD-10-CM

## 2023-06-09 DIAGNOSIS — Z23 NEED FOR VACCINATION: ICD-10-CM

## 2023-06-09 DIAGNOSIS — Z78.0 POST-MENOPAUSAL: ICD-10-CM

## 2023-06-09 PROBLEM — M25.512 CHRONIC LEFT SHOULDER PAIN: Status: RESOLVED | Noted: 2021-06-09 | Resolved: 2023-06-09

## 2023-06-09 PROBLEM — G89.29 CHRONIC LEFT SHOULDER PAIN: Status: RESOLVED | Noted: 2021-06-09 | Resolved: 2023-06-09

## 2023-06-09 PROCEDURE — 73502 X-RAY EXAM HIP UNI 2-3 VIEWS: CPT

## 2023-06-09 PROCEDURE — G0439 PPPS, SUBSEQ VISIT: HCPCS | Performed by: FAMILY MEDICINE

## 2023-06-09 PROCEDURE — 99214 OFFICE O/P EST MOD 30 MIN: CPT | Performed by: FAMILY MEDICINE

## 2023-06-09 RX ORDER — ROSUVASTATIN CALCIUM 5 MG/1
5 TABLET, COATED ORAL DAILY
Qty: 90 TABLET | Refills: 3 | Status: SHIPPED | OUTPATIENT
Start: 2023-06-09

## 2023-06-09 NOTE — PROGRESS NOTES
Assessment and Plan:     Problem List Items Addressed This Visit        Nervous and Auditory    Cervical radiculopathy     Symptoms are stable  Musculoskeletal and Integument    Trigger finger of right thumb     She is following with hand surgery and at this time  Fortunately, this is quite stable right now            Genitourinary    History of UTI     I will give her a prescription for antibiotics when she comes back as she does live in the [place] a boat throughout the winter  Stage 3a chronic kidney disease [facility])     Lab Results   Component Value Date    CREATININE 1 02 06/06/2023    CREATININE 1 02 06/07/2022    CREATININE 0 98 06/22/2020    EGFR 57 06/06/2023    EGFR 57 06/07/2022    EGFR 62 06/22/2020   GFR is just within stage IIIa CKD  She does not take NSAIDs  Risk factors are well controlled  Relevant Orders    Comprehensive metabolic panel    Lipid Panel with Direct LDL reflex    CBC and differential       Other    Cervical spinal stenosis    Female stress incontinence     This is minimal   I gave her a print out on Kegel exercises  Pure hypercholesterolemia     Unfortunately, it LDL has trended up significantly  I would like to try rosuvastatin at a low-dose  She may start it just taking Monday Wednesday Friday if she would like  Check labs prior to follow-up  Relevant Medications    rosuvastatin (CRESTOR) 5 mg tablet    Other Relevant Orders    Comprehensive metabolic panel    Lipid Panel with Direct LDL reflex    History of anemia    Relevant Orders    CBC and differential    History of CVA (cerebrovascular accident)    Left hip pain     Exam is quite normal at this time  Since her symptoms are so sporadic it may be a labral issue  Check an x-ray today to rule out significant arthritis           Relevant Orders    XR hip/pelv 2-3 vws left if performed   Other Visit Diagnoses     Medicare annual wellness visit, subsequent    -  Primary Encounter for screening mammogram for breast cancer        Relevant Orders    Mammo screening bilateral w 3d & cad    Post-menopausal        Relevant Orders    DXA bone density spine hip and pelvis    Need for vaccination               Preventive health issues were discussed with patient, and age appropriate screening tests were ordered as noted in patient's After Visit Summary  Personalized health advice and appropriate referrals for health education or preventive services given if needed, as noted in patient's After Visit Summary  History of Present Illness:     Patient presents for a Medicare Wellness Visit    HPI patient presents today for follow-up of chronic health issues  Overall, she is doing quite well  She had significant trigger finger which has pretty much resolved  She was prescribed a Medrol Dosepak by Ortho just in case it recurs  She has a history of stroke when on OCPs in the past and remains on aspirin  Cholesterol is quite high  She is having some discomfort in her left groin that can be quite severe on occasion  This started about 6 months ago  Patient Care Team:  Rojas Lara MD as PCP - General  Martinez Swain DO as Endoscopist     Review of Systems:     Review of Systems   Constitutional: Negative for appetite change, chills, fatigue, fever and unexpected weight change  HENT: Negative for trouble swallowing  Eyes: Negative for visual disturbance  Respiratory: Negative for cough, chest tightness, shortness of breath and wheezing  Cardiovascular: Negative for chest pain, palpitations and leg swelling  Gastrointestinal: Negative for abdominal distention, abdominal pain, blood in stool, constipation and diarrhea  Endocrine: Negative for polyuria  Genitourinary: Negative for difficulty urinating and flank pain  Musculoskeletal: Negative for arthralgias and myalgias  Skin: Negative for rash     Neurological: Negative for dizziness and light-headedness  Hematological: Negative for adenopathy  Does not bruise/bleed easily  Psychiatric/Behavioral: Negative for dysphoric mood and sleep disturbance  The patient is not nervous/anxious  Problem List:     Patient Active Problem List   Diagnosis   • Cervical spinal stenosis   • Female stress incontinence   • Pure hypercholesterolemia   • History of anemia   • History of CVA (cerebrovascular accident)   • Dermatitis   • History of UTI   • Cervical radiculopathy   • Primary osteoarthritis of left shoulder   • Cervical spondylosis   • Trigger finger of right thumb   • Stage 3a chronic kidney disease ([facility] [address] )   • Left hip pain      Past Medical and Surgical History:     Past Medical History:   Diagnosis Date   • Cervical ca ([facility] [address] )    • Chronic bilateral low back pain 2018   • Depression    • Depression    • Depression    • Hyperlipidemia    • Hyperlipidemia    • Iron deficiency anemia    • Laceration of left thumb 10/21/2020   • Low back pain    • Lumbar radiculopathy 2019    S/p MIS Navigated L3-4 laminectomy/decompression (right approach) with transforaminal lumbar interbody fusion Dr Nohelia Jung 19     • Meralgia paresthetica    • Sciatica     right side   • Spinal stenosis of lumbar region with neurogenic claudication 2019   • Spondylolisthesis of lumbar region 3/8/2019   • Stroke [facility])     embolic   • Synovial cyst of lumbar facet joint 2019   • Trigger finger of left thumb 2020    Injected May 27, 2020, 2020        Past Surgical History:   Procedure Laterality Date   •  SECTION      x2   ,    • COLONOSCOPY     • HYSTEROSCOPY     • FL ARTHRODESIS COMBINED TQ 1NTRSPC LUMBAR N/A 2019    Procedure: MIS Navigated L3-4 laminectomy/decompression (right approach) with transforaminal lumbar interbody fusion;  Surgeon: Alo Martin MD;  Location: [facility];  Service: Neurosurgery   • FL COLONOSCOPY FLX DX W/COLLJ SPEC WHEN PFRMD N/A 08/07/2017    Procedure: COLONOSCOPY;  Surgeon: Valencia Osler, DO;  Location: [facility] GI LAB; Service: Gastroenterology   • AL TENDON SHEATH INCISION Left 09/14/2021    Procedure: Left thumb trigger finger release;  Surgeon: Sonia Mauricio MD;  Location:  MAIN OR;  Service: Orthopedics   • TOE SURGERY Right 07/2021    big toe stiched and broken   • TRIGGER FINGER RELEASE Left 09/2021   • TUBAL LIGATION        Family History:     Family History   Problem Relation Age of Onset   • Hyperlipidemia Mother    • Heart disease Mother 64        alive in [de-identified]   • Stroke Mother    • Depression Sister    • Hypertension Sister    • Cancer Maternal Grandfather    • Hypertension Family    • Stroke Family       Social History:     Social History     Socioeconomic History   • Marital status:      Spouse name: None   • Number of children: 2   • Years of education: Bachelors degree   • Highest education level: None   Occupational History   • Occupation:    Tobacco Use   • Smoking status: Never   • Smokeless tobacco: Never   Vaping Use   • Vaping Use: Never used   Substance and Sexual Activity   • Alcohol use: Not Currently   • Drug use: Never   • Sexual activity: Yes     Birth control/protection: Post-menopausal   Other Topics Concern   • None   Social History Narrative   • None     Social Determinants of Health     Financial Resource Strain: Low Risk  (6/8/2023)    Overall Financial Resource Strain (CARDIA)    • Difficulty of Paying Living Expenses: Not hard at all   Food Insecurity: Not on file   Transportation Needs: No Transportation Needs (6/8/2023)    PRAPARE - Transportation    • Lack of Transportation (Medical): No    • Lack of Transportation (Non-Medical):  No   Physical Activity: Not on file   Stress: Not on file   Social Connections: Not on file   Intimate Partner Violence: Not on file   Housing Stability: Not on file      Medications and Allergies:     Current Outpatient Medications Medication Sig Dispense Refill   • aspirin 325 mg tablet Take 325 mg by mouth daily     • estradiol (ESTRACE VAGINAL) 0 1 mg/g vaginal cream Insert 1 g into the vagina once a week 1 gram vaginally twice weekly 85 g 2   • methylPREDNISolone 4 MG tablet therapy pack Use as directed on package 1 each 0   • multivitamin (THERAGRAN) TABS Take 1 tablet by mouth daily     • rosuvastatin (CRESTOR) 5 mg tablet Take 1 tablet (5 mg total) by mouth daily 90 tablet 3   • triamcinolone (KENALOG) 0 1 % oral topical paste Flat to 4 times daily as needed for aphthous ulcer  5 g 5     No current facility-administered medications for this visit  Allergies   Allergen Reactions   • Paxil [Paroxetine] Rash      Immunizations:     Immunization History   Administered Date(s) Administered   • COVID-19 PFIZER VACCINE 0 3 ML IM 03/04/2021, 03/25/2021, 11/29/2021   • Hep A, adult 01/01/2004   • Hep B, adult 12/01/2004, 01/01/2005, 07/01/2005   • INFLUENZA 12/24/2015   • Influenza Quadrivalent Preservative Free 3 years and older IM 10/20/2017   • Influenza Quadrivalent, 6-35 Months IM 12/24/2015   • Influenza, high dose seasonal 0 7 mL 10/19/2022   • Influenza, recombinant, quadrivalent,injectable, preservative free 11/23/2018, 10/28/2019, 10/07/2020, 10/13/2021   • Influenza, seasonal, injectable 11/30/2011, 10/04/2013   • Tdap 10/21/2020      Health Maintenance:         Topic Date Due   • DXA SCAN  06/04/2015   • Breast Cancer Screening: Mammogram  06/09/2023   • Cervical Cancer Screening  09/14/2025   • Colorectal Cancer Screening  08/07/2027   • Hepatitis C Screening  Completed         Topic Date Due   • COVID-19 Vaccine (4 - Pfizer series) 01/24/2022      Medicare Screening Tests and Risk Assessments:     Zak Patel is here for her Subsequent Wellness visit  Health Risk Assessment:   Patient rates overall health as very good  Patient feels that their physical health rating is same  Patient is satisfied with their life   Eyesight was rated as same  Hearing was rated as same  Patient feels that their emotional and mental health rating is same  Patients states they are never, rarely angry  Patient states they are never, rarely unusually tired/fatigued  Pain experienced in the last 7 days has been some  Patient's pain rating has been 6/10  Patient states that she has experienced no weight loss or gain in last 6 months  Depression Screening:   PHQ-2 Score: 0      Fall Risk Screening: In the past year, patient has experienced: no history of falling in past year      Urinary Incontinence Screening:   Patient has leaked urine accidently in the last six months  Home Safety:  Patient does not have trouble with stairs inside or outside of their home  Patient has working smoke alarms and has working carbon monoxide detector  Home safety hazards include: none  Nutrition:   Current diet is Regular  Medications:   Patient is not currently taking any over-the-counter supplements  Patient is able to manage medications  Activities of Daily Living (ADLs)/Instrumental Activities of Daily Living (IADLs):   Walk and transfer into and out of bed and chair?: Yes  Dress and groom yourself?: Yes    Bathe or shower yourself?: Yes    Feed yourself?  Yes  Do your laundry/housekeeping?: Yes  Manage your money, pay your bills and track your expenses?: Yes  Make your own meals?: Yes    Do your own shopping?: Yes    Previous Hospitalizations:   Any hospitalizations or ED visits within the last 12 months?: No      Advance Care Planning:   Living will: No    Durable POA for healthcare: No    Advanced directive: No    End of Life Decisions reviewed with patient: Yes      Cognitive Screening:   Provider or family/friend/caregiver concerned regarding cognition?: No    PREVENTIVE SCREENINGS      Cardiovascular Screening:    General: Screening Not Indicated and History Lipid Disorder      Diabetes Screening:     General: Screening Current      Colorectal Cancer "Screening:     General: Screening Current      Breast Cancer Screening:     General: Screening Current      Cervical Cancer Screening:    General: History Cervical Cancer      Lung Cancer Screening:     General: Screening Not Indicated      Hepatitis C Screening:    General: Screening Current    Screening, Brief Intervention, and Referral to Treatment (SBIRT)    Screening  Typical number of drinks in a day: 0  Typical number of drinks in a week: 0  Interpretation: Low risk drinking behavior  AUDIT-C Screenin) How often did you have a drink containing alcohol in the past year? monthly or less  2) How many drinks did you have on a typical day when you were drinking in the past year? 1 to 2  3) How often did you have 6 or more drinks on one occasion in the past year? never    AUDIT-C Score: 1  Interpretation: Score 0-2 (female): Negative screen for alcohol misuse    Single Item Drug Screening:  How often have you used an illegal drug (including marijuana) or a prescription medication for non-medical reasons in the past year? never    Single Item Drug Screen Score: 0  Interpretation: Negative screen for possible drug use disorder    No results found  Physical Exam:     /80 (BP Location: Left arm, Patient Position: Sitting, Cuff Size: Standard)   Pulse (!) 54   Temp (!) 97 2 °F (36 2 °C) (Tympanic)   Ht 5' 3\" (1 6 m)   Wt 55 9 kg (123 lb 3 2 oz)   SpO2 99%   BMI 21 82 kg/m²     Physical Exam  Constitutional:       General: She is not in acute distress  Appearance: She is well-developed  She is not diaphoretic  HENT:      Head: Normocephalic  Right Ear: External ear normal       Left Ear: External ear normal       Nose: Nose normal    Eyes:      General: No scleral icterus  Right eye: No discharge  Left eye: No discharge  Conjunctiva/sclera: Conjunctivae normal       Pupils: Pupils are equal, round, and reactive to light  Neck:      Thyroid: No thyromegaly        " Trachea: No tracheal deviation  Cardiovascular:      Rate and Rhythm: Normal rate and regular rhythm  Heart sounds: Normal heart sounds  No murmur heard  Pulmonary:      Effort: Pulmonary effort is normal  No respiratory distress  Breath sounds: Normal breath sounds  Abdominal:      General: Bowel sounds are normal  There is no distension  Palpations: Abdomen is soft  Tenderness: There is no abdominal tenderness  Musculoskeletal:         General: No tenderness or deformity  Normal range of motion  Lymphadenopathy:      Cervical: No cervical adenopathy  Skin:     General: Skin is warm  Coloration: Skin is not pale  Findings: No erythema or rash  Neurological:      Cranial Nerves: No cranial nerve deficit        Coordination: Coordination normal    Psychiatric:         Behavior: Behavior normal           Sharif Eldridge MD

## 2023-06-09 NOTE — ASSESSMENT & PLAN NOTE
I will give her a prescription for antibiotics when she comes back as she does live in the [place] throughout the winter

## 2023-06-09 NOTE — ASSESSMENT & PLAN NOTE
Unfortunately, it LDL has trended up significantly  I would like to try rosuvastatin at a low-dose  She may start it just taking Monday Wednesday Friday if she would like  Check labs prior to follow-up

## 2023-06-09 NOTE — PATIENT INSTRUCTIONS
Kegel Exercises for Women   AMBULATORY CARE:   Kegel exercises  help strengthen your pelvic muscles  Pelvic muscles hold your pelvic organs, such as your bladder and uterus, in place  Kegel exercises help prevent or control certain conditions, such as urine incontinence (leakage) or uterine prolapse  Call your doctor or physical therapist if:   • You cannot feel your muscles tighten or relax  • You continue to leak urine  • You have questions or concerns about your condition or care  Use the correct muscles:  Pelvic muscles are the muscles you use to control urine flow  To target these muscles, stop and start the flow of urine several times  This will help you become familiar with how it feels to tighten and relax these muscles  How to do Kegel exercises:   • Get into a comfortable position  You may lie down, stand up, or sit down to do these exercises  When you first try to do these exercises, it may be easier if you lie down  • Tighten or squeeze your pelvic muscles slowly  It may feel like you are trying to hold back urine or gas  Hold this position for 3 seconds  Relax for 3 seconds  Repeat this cycle 10 times  Do not hold your breath when you do Kegel exercises  Keep your stomach, back, and leg muscles relaxed  • Do 10 sets of Kegel exercises, at least 3 times a day  When you know how to do Kegel exercises, use different positions  This will help to strengthen your pelvic muscles as much as possible  You can do these exercises while you lie on the floor, watch TV, or while you stand  Tighten your pelvic muscles before you sneeze, cough, or lift to prevent urine leakage  You may notice improved bladder control within about 6 weeks  Follow up with your doctor or physical therapist as directed:  Write down your questions so you remember to ask them during your visits    © Copyright [employer] 2022 Information is for End User's use only and may not be sold, redistributed or otherwise used for commercial purposes  The above information is an  only  It is not intended as medical advice for individual conditions or treatments  Talk to your doctor, nurse or pharmacist before following any medical regimen to see if it is safe and effective for you

## 2023-06-09 NOTE — ASSESSMENT & PLAN NOTE
Exam is quite normal at this time  Since her symptoms are so sporadic it may be a labral issue  Check an x-ray today to rule out significant arthritis

## 2023-06-09 NOTE — ASSESSMENT & PLAN NOTE
Lab Results   Component Value Date    CREATININE 1 02 06/06/2023    CREATININE 1 02 06/07/2022    CREATININE 0 98 06/22/2020    EGFR 57 06/06/2023    EGFR 57 06/07/2022    EGFR 62 06/22/2020   GFR is just within stage IIIa CKD  She does not take NSAIDs  Risk factors are well controlled

## 2023-06-16 ENCOUNTER — TELEPHONE (OUTPATIENT)
Dept: FAMILY MEDICINE CLINIC | Facility: CLINIC | Age: 68
End: 2023-06-16

## 2023-06-16 DIAGNOSIS — M25.552 LEFT HIP PAIN: Primary | ICD-10-CM

## 2023-06-16 NOTE — TELEPHONE ENCOUNTER
Problem List Items Addressed This Visit        Other    Left hip pain - Primary     Pt with ongoing L hip pain concerning for labral issue    Will check MRI         Relevant Orders    MRI arthrogram left hip

## 2023-08-14 ENCOUNTER — TELEPHONE (OUTPATIENT)
Dept: RADIOLOGY | Facility: HOSPITAL | Age: 68
End: 2023-08-14

## 2023-08-14 NOTE — NURSING NOTE
RN received telephone call back from pt to discuss upcoming appointment at [facility]iology [facility] and consultation completed. Pt is having a L Hip Arthrogram completed on 8/16/2023. Allergies reviewed and verified only anticoagulant medication pt takes is ASA which pt may continue taking. Pre procedure instructions including diet and taking own medications discussed with pt. Pt instructed that she may eat normally and take medications as usual before the procedure. Pt verbalized understanding of instructions given. Reminded pt of the location, date and time of procedure. My number was given to call if any questions or concerns arise pre or post procedure.

## 2023-08-15 ENCOUNTER — HOSPITAL ENCOUNTER (OUTPATIENT)
Dept: MAMMOGRAPHY | Facility: MEDICAL CENTER | Age: 68
Discharge: HOME | End: 2023-08-15
Payer: MEDICARE

## 2023-08-15 VITALS — BODY MASS INDEX: 21.79 KG/M2 | HEIGHT: 63 IN | WEIGHT: 123 LBS

## 2023-08-15 DIAGNOSIS — Z12.31 ENCOUNTER FOR SCREENING MAMMOGRAM FOR BREAST CANCER: ICD-10-CM

## 2023-08-15 PROCEDURE — 77067 SCR MAMMO BI INCL CAD: CPT

## 2023-08-15 PROCEDURE — 77063 BREAST TOMOSYNTHESIS BI: CPT

## 2023-08-16 ENCOUNTER — HOSPITAL ENCOUNTER (OUTPATIENT)
Dept: RADIOLOGY | Facility: HOSPITAL | Age: 68
Discharge: HOME | End: 2023-08-16
Payer: MEDICARE

## 2023-08-16 ENCOUNTER — HOSPITAL ENCOUNTER (OUTPATIENT)
Dept: MRI IMAGING | Facility: HOSPITAL | Age: 68
Discharge: HOME | End: 2023-08-16
Payer: MEDICARE

## 2023-08-16 ENCOUNTER — HOSPITAL ENCOUNTER (OUTPATIENT)
Dept: BONE DENSITY | Facility: MEDICAL CENTER | Age: 68
Discharge: HOME | End: 2023-08-16
Payer: MEDICARE

## 2023-08-16 DIAGNOSIS — M25.552 LEFT HIP PAIN: ICD-10-CM

## 2023-08-16 DIAGNOSIS — Z78.0 POST-MENOPAUSAL: ICD-10-CM

## 2023-08-16 PROCEDURE — 27093 INJECTION FOR HIP X-RAY: CPT

## 2023-08-16 PROCEDURE — 73722 MRI JOINT OF LWR EXTR W/DYE: CPT

## 2023-08-16 PROCEDURE — G1004 CDSM NDSC: HCPCS

## 2023-08-16 PROCEDURE — A9585 GADOBUTROL INJECTION: HCPCS | Performed by: FAMILY MEDICINE

## 2023-08-16 PROCEDURE — 77002 NEEDLE LOCALIZATION BY XRAY: CPT

## 2023-08-16 PROCEDURE — 77080 DXA BONE DENSITY AXIAL: CPT

## 2023-08-16 PROCEDURE — 77080 DXA BONE DENSITY AXIAL: CPT | Performed by: OBSTETRICS & GYNECOLOGY

## 2023-08-16 RX ORDER — LIDOCAINE HYDROCHLORIDE 10 MG/ML
4 INJECTION, SOLUTION EPIDURAL; INFILTRATION; INTRACAUDAL; PERINEURAL
Status: DISCONTINUED | OUTPATIENT
Start: 2023-08-16 | End: 2023-08-17 | Stop reason: HOSPADM

## 2023-08-16 RX ORDER — GADOBUTROL 604.72 MG/ML
0.2 INJECTION INTRAVENOUS
Status: COMPLETED | OUTPATIENT
Start: 2023-08-16 | End: 2023-08-16

## 2023-08-16 RX ORDER — SODIUM CHLORIDE 9 MG/ML
12 INJECTION INTRAVENOUS
Status: DISCONTINUED | OUTPATIENT
Start: 2023-08-16 | End: 2023-08-17 | Stop reason: HOSPADM

## 2023-08-16 RX ADMIN — GADOBUTROL 0.2 ML: 604.72 INJECTION INTRAVENOUS at 10:06

## 2023-08-16 RX ADMIN — IOHEXOL 2 ML: 300 INJECTION, SOLUTION INTRAVENOUS at 10:06

## 2023-08-21 ENCOUNTER — TELEPHONE (OUTPATIENT)
Dept: FAMILY MEDICINE CLINIC | Facility: CLINIC | Age: 68
End: 2023-08-21

## 2023-08-21 DIAGNOSIS — S73.192A TEAR OF LEFT ACETABULAR LABRUM, INITIAL ENCOUNTER: Primary | ICD-10-CM

## 2023-08-21 DIAGNOSIS — M25.552 LEFT HIP PAIN: ICD-10-CM

## 2023-08-21 NOTE — TELEPHONE ENCOUNTER
Date/Time: 8-21-23 / 8:27 AM    Pt called in stating she would like a referral put in her chart for Ortho. She stated she'd like to try and schedule an appt with them in Oct.  Please place order if appropriate.

## 2023-10-23 ENCOUNTER — OFFICE VISIT (OUTPATIENT)
Dept: FAMILY MEDICINE CLINIC | Facility: CLINIC | Age: 68
End: 2023-10-23
Payer: MEDICARE

## 2023-10-23 VITALS
HEIGHT: 63 IN | RESPIRATION RATE: 18 BRPM | DIASTOLIC BLOOD PRESSURE: 80 MMHG | SYSTOLIC BLOOD PRESSURE: 110 MMHG | TEMPERATURE: 98.9 F | HEART RATE: 58 BPM | BODY MASS INDEX: 21.9 KG/M2 | OXYGEN SATURATION: 95 % | WEIGHT: 123.6 LBS

## 2023-10-23 DIAGNOSIS — M47.812 CERVICAL SPONDYLOSIS: ICD-10-CM

## 2023-10-23 DIAGNOSIS — Z86.2 HISTORY OF ANEMIA: ICD-10-CM

## 2023-10-23 DIAGNOSIS — Z87.440 HISTORY OF UTI: ICD-10-CM

## 2023-10-23 DIAGNOSIS — E78.00 PURE HYPERCHOLESTEROLEMIA: ICD-10-CM

## 2023-10-23 DIAGNOSIS — N18.31 STAGE 3A CHRONIC KIDNEY DISEASE (HCC): ICD-10-CM

## 2023-10-23 DIAGNOSIS — Z86.73 HISTORY OF CVA (CEREBROVASCULAR ACCIDENT): ICD-10-CM

## 2023-10-23 DIAGNOSIS — M54.12 CERVICAL RADICULOPATHY: ICD-10-CM

## 2023-10-23 DIAGNOSIS — Z23 ENCOUNTER FOR IMMUNIZATION: Primary | ICD-10-CM

## 2023-10-23 PROBLEM — L30.9 DERMATITIS: Status: RESOLVED | Noted: 2020-05-27 | Resolved: 2023-10-23

## 2023-10-23 PROCEDURE — 99214 OFFICE O/P EST MOD 30 MIN: CPT | Performed by: FAMILY MEDICINE

## 2023-10-23 PROCEDURE — 90662 IIV NO PRSV INCREASED AG IM: CPT | Performed by: FAMILY MEDICINE

## 2023-10-23 PROCEDURE — G0008 ADMIN INFLUENZA VIRUS VAC: HCPCS | Performed by: FAMILY MEDICINE

## 2023-10-23 NOTE — ASSESSMENT & PLAN NOTE
She seems to be having some progressive symptoms with hand tingling. There is no sign of weakness on exam today thankfully. I would like her to get an EMG at some point and I have ordered this.

## 2023-10-23 NOTE — PROGRESS NOTES
Assessment/Plan:       Problem List Items Addressed This Visit        Nervous and Auditory    Cervical radiculopathy     She seems to be having some progressive symptoms with hand tingling. There is no sign of weakness on exam today thankfully. I would like her to get an EMG at some point and I have ordered this. Musculoskeletal and Integument    Cervical spondylosis    Relevant Orders    EMG 2 Limb Upper Extremity       Genitourinary    History of UTI    Stage 3a chronic kidney disease ([address])     Lab Results   Component Value Date    EGFR 57 06/06/2023    EGFR 57 06/07/2022    EGFR 62 06/22/2020    CREATININE 1.02 06/06/2023    CREATININE 1.02 06/07/2022    CREATININE 0.98 06/22/2020   Check labs including CMP            Other    Pure hypercholesterolemia     She is on low-dose rosuvastatin at this point. Check labs. History of anemia    History of CVA (cerebrovascular accident)   Other Visit Diagnoses     Encounter for immunization    -  Primary    Relevant Orders    influenza vaccine, high-dose, PF 0.7 mL (FLUZONE HIGH-DOSE) (Completed)            Subjective:      Patient ID: Patti Romeo is a 79 y.o. female. HPI patient resents today for follow-up of chronic health issues. Unfortunate, she does continue to have a very stiff neck and is also having some occasional radiation of pain and tingling down her arms. She denies any weakness. She remains extremely active. She has a prior history of CVA in 2011 while on oral contraceptive pill. She denies any strokelike symptoms and has done well since that time. She is a history of UTI and CKD but everything seems stable in this department.     The following portions of the patient's history were reviewed and updated as appropriate: allergies, current medications, past family history, past medical history, past social history, past surgical history and problem list.      Current Outpatient Medications:   •  aspirin 325 mg tablet, Take 325 mg by mouth daily, Disp: , Rfl:   •  estradiol (ESTRACE VAGINAL) 0.1 mg/g vaginal cream, Insert 1 g into the vagina once a week 1 gram vaginally twice weekly, Disp: 85 g, Rfl: 2  •  rosuvastatin (CRESTOR) 5 mg tablet, Take 1 tablet (5 mg total) by mouth daily, Disp: 90 tablet, Rfl: 3  •  predniSONE 10 mg tablet, Take 1 tablet (10 mg total) by mouth daily 6 x 2 days, 5 x 2 days, 4 x 2 days, 3 x 2 days, 2 x 2 days, 1 x 2 days, Disp: 42 tablet, Rfl: 0     Review of Systems   Constitutional:  Negative for fatigue. HENT:  Negative for trouble swallowing. Respiratory:  Negative for chest tightness, shortness of breath and wheezing. Cardiovascular:  Negative for chest pain, palpitations and leg swelling. Gastrointestinal:  Negative for abdominal pain, constipation and diarrhea. Endocrine: Negative for polyuria. Genitourinary:  Negative for difficulty urinating and flank pain. Musculoskeletal:  Positive for neck pain and neck stiffness. Negative for arthralgias and myalgias. Skin:  Negative for rash. Psychiatric/Behavioral:  Negative for sleep disturbance. Objective:      /80 (BP Location: Left arm, Patient Position: Sitting, Cuff Size: Standard)   Pulse 58   Temp 98.9 °F (37.2 °C) (Tympanic)   Resp 18   Ht 5' 3" (1.6 m)   Wt 56.1 kg (123 lb 9.6 oz)   SpO2 95%   BMI 21.89 kg/m²          Physical Exam  Vitals reviewed. Constitutional:       General: She is not in acute distress. Appearance: She is well-developed. She is not diaphoretic. HENT:      Head: Normocephalic. Eyes:      General:         Right eye: No discharge. Left eye: No discharge. Pupils: Pupils are equal, round, and reactive to light. Neck:      Thyroid: No thyromegaly. Trachea: No tracheal deviation. Cardiovascular:      Rate and Rhythm: Normal rate and regular rhythm. Heart sounds: Normal heart sounds. No murmur heard.   Pulmonary:      Effort: Pulmonary effort is normal. No respiratory distress. Breath sounds: No wheezing or rales. Abdominal:      General: There is no distension. Palpations: Abdomen is soft. Tenderness: There is no abdominal tenderness. Musculoskeletal:         General: Normal range of motion. Right lower leg: No edema. Left lower leg: No edema. Comments: She has some loss of kyphosis of her cervical spine. She has some decreased range of motion with lateral flexion bilaterally in particular.  strength and upper extremity strength is intact. Lymphadenopathy:      Cervical: No cervical adenopathy. Skin:     General: Skin is warm. Findings: No erythema. Neurological:      General: No focal deficit present. Mental Status: She is alert and oriented to person, place, and time. Gait: Gait normal.   Psychiatric:         Thought Content:  Thought content normal.         Judgment: Judgment normal.           Tigist Ahmadi MD

## 2023-10-23 NOTE — ASSESSMENT & PLAN NOTE
Lab Results   Component Value Date    EGFR 57 06/06/2023    EGFR 57 06/07/2022    EGFR 62 06/22/2020    CREATININE 1.02 06/06/2023    CREATININE 1.02 06/07/2022    CREATININE 0.98 06/22/2020   Check labs including CMP

## 2023-10-24 ENCOUNTER — LAB (OUTPATIENT)
Dept: LAB | Facility: MEDICAL CENTER | Age: 68
End: 2023-10-24
Payer: MEDICARE

## 2023-10-24 ENCOUNTER — OFFICE VISIT (OUTPATIENT)
Dept: OBGYN CLINIC | Facility: MEDICAL CENTER | Age: 68
End: 2023-10-24
Payer: MEDICARE

## 2023-10-24 VITALS
SYSTOLIC BLOOD PRESSURE: 136 MMHG | DIASTOLIC BLOOD PRESSURE: 85 MMHG | BODY MASS INDEX: 21.93 KG/M2 | HEIGHT: 63 IN | WEIGHT: 123.8 LBS | HEART RATE: 53 BPM

## 2023-10-24 DIAGNOSIS — E78.00 PURE HYPERCHOLESTEROLEMIA: ICD-10-CM

## 2023-10-24 DIAGNOSIS — M25.552 LEFT HIP PAIN: ICD-10-CM

## 2023-10-24 DIAGNOSIS — Z86.2 HISTORY OF ANEMIA: ICD-10-CM

## 2023-10-24 DIAGNOSIS — N18.31 STAGE 3A CHRONIC KIDNEY DISEASE (HCC): ICD-10-CM

## 2023-10-24 DIAGNOSIS — S73.192A TEAR OF LEFT ACETABULAR LABRUM, INITIAL ENCOUNTER: Primary | ICD-10-CM

## 2023-10-24 LAB
ALBUMIN SERPL BCP-MCNC: 4.2 G/DL (ref 3.5–5)
ALP SERPL-CCNC: 74 U/L (ref 34–104)
ALT SERPL W P-5'-P-CCNC: 17 U/L (ref 7–52)
ANION GAP SERPL CALCULATED.3IONS-SCNC: 8 MMOL/L
AST SERPL W P-5'-P-CCNC: 24 U/L (ref 13–39)
BASOPHILS # BLD AUTO: 0.05 THOUSANDS/ÂΜL (ref 0–0.1)
BASOPHILS NFR BLD AUTO: 1 % (ref 0–1)
BILIRUB SERPL-MCNC: 0.68 MG/DL (ref 0.2–1)
BUN SERPL-MCNC: 23 MG/DL (ref 5–25)
CALCIUM SERPL-MCNC: 9.2 MG/DL (ref 8.4–10.2)
CHLORIDE SERPL-SCNC: 104 MMOL/L (ref 96–108)
CHOLEST SERPL-MCNC: 210 MG/DL
CO2 SERPL-SCNC: 27 MMOL/L (ref 21–32)
CREAT SERPL-MCNC: 0.89 MG/DL (ref 0.6–1.3)
EOSINOPHIL # BLD AUTO: 0.12 THOUSAND/ÂΜL (ref 0–0.61)
EOSINOPHIL NFR BLD AUTO: 2 % (ref 0–6)
ERYTHROCYTE [DISTWIDTH] IN BLOOD BY AUTOMATED COUNT: 13.4 % (ref 11.6–15.1)
GFR SERPL CREATININE-BSD FRML MDRD: 67 ML/MIN/1.73SQ M
GLUCOSE P FAST SERPL-MCNC: 91 MG/DL (ref 65–99)
HCT VFR BLD AUTO: 42.1 % (ref 34.8–46.1)
HDLC SERPL-MCNC: 81 MG/DL
HGB BLD-MCNC: 13.7 G/DL (ref 11.5–15.4)
IMM GRANULOCYTES # BLD AUTO: 0.02 THOUSAND/UL (ref 0–0.2)
IMM GRANULOCYTES NFR BLD AUTO: 0 % (ref 0–2)
LDLC SERPL CALC-MCNC: 107 MG/DL (ref 0–100)
LYMPHOCYTES # BLD AUTO: 1.74 THOUSANDS/ÂΜL (ref 0.6–4.47)
LYMPHOCYTES NFR BLD AUTO: 26 % (ref 14–44)
MCH RBC QN AUTO: 30.6 PG (ref 26.8–34.3)
MCHC RBC AUTO-ENTMCNC: 32.5 G/DL (ref 31.4–37.4)
MCV RBC AUTO: 94 FL (ref 82–98)
MONOCYTES # BLD AUTO: 0.43 THOUSAND/ÂΜL (ref 0.17–1.22)
MONOCYTES NFR BLD AUTO: 7 % (ref 4–12)
NEUTROPHILS # BLD AUTO: 4.27 THOUSANDS/ÂΜL (ref 1.85–7.62)
NEUTS SEG NFR BLD AUTO: 64 % (ref 43–75)
NRBC BLD AUTO-RTO: 0 /100 WBCS
PLATELET # BLD AUTO: 249 THOUSANDS/UL (ref 149–390)
PMV BLD AUTO: 10.5 FL (ref 8.9–12.7)
POTASSIUM SERPL-SCNC: 4.5 MMOL/L (ref 3.5–5.3)
PROT SERPL-MCNC: 7 G/DL (ref 6.4–8.4)
RBC # BLD AUTO: 4.47 MILLION/UL (ref 3.81–5.12)
SODIUM SERPL-SCNC: 139 MMOL/L (ref 135–147)
TRIGL SERPL-MCNC: 110 MG/DL
WBC # BLD AUTO: 6.63 THOUSAND/UL (ref 4.31–10.16)

## 2023-10-24 PROCEDURE — 80053 COMPREHEN METABOLIC PANEL: CPT

## 2023-10-24 PROCEDURE — 80061 LIPID PANEL: CPT

## 2023-10-24 PROCEDURE — 85025 COMPLETE CBC W/AUTO DIFF WBC: CPT

## 2023-10-24 PROCEDURE — 36415 COLL VENOUS BLD VENIPUNCTURE: CPT

## 2023-10-24 PROCEDURE — 99214 OFFICE O/P EST MOD 30 MIN: CPT | Performed by: STUDENT IN AN ORGANIZED HEALTH CARE EDUCATION/TRAINING PROGRAM

## 2023-10-24 RX ORDER — PREDNISONE 10 MG/1
10 TABLET ORAL DAILY
Qty: 42 TABLET | Refills: 0 | Status: SHIPPED | OUTPATIENT
Start: 2023-10-24

## 2023-10-24 NOTE — PROGRESS NOTES
Hip New Office Note    Assessment:     1. Tear of left acetabular labrum, initial encounter    2. Left hip pain        Plan:   Diagnoses and all orders for this visit:    Tear of left acetabular labrum, initial encounter    Left hip pain     Patient has an anterior superior acetabular labral tear of her left hip. The pathoanatomy and natural history of this diagnosis was explained to the patient in detail today in the office. She understood and all questions were answered. As she is leaving the area on Thursday until May, we will prescribe her a Prednisone dose pack to try and help her symptoms/pain. She could consider a US guided cortisone injection into her left hip when she returns. A referral was also placed to follow up with Dr Johan Ramirez if her symptoms persist despite the dose pack and cortisone injection. Follow up on an as needed basis     Subjective:     Patient ID: June Pacheco is a 79 y.o. female. Chief Complaint: Left Hip pain  HPI:    Patient presents today for an orthopedic surgery consultation visit at the request of Dr Vero Carey on 8/21/23 for left hip pain. Patient reports that her pain began a few months ago without acute injury or trauma. She localizes her symptoms and pain about the groin. Pain is worse with internal rotation of the hip and intermittent in timing. She denies any previous treatments to the left hip. No numbness or tingling. No fevers or chills.        Allergy:  Allergies   Allergen Reactions    Paxil [Paroxetine] Rash     Medications:  all current active meds have been reviewed  Past Medical History:  Past Medical History:   Diagnosis Date    Cervical ca ([address]) 1979    Chronic bilateral low back pain 11/23/2018    Depression     Depression     Depression     Dermatitis 05/27/2020    Hyperlipidemia     Hyperlipidemia     Iron deficiency anemia     Laceration of left thumb 10/21/2020    Low back pain     Lumbar radiculopathy 01/28/2019    S/p MIS Navigated L3-4 laminectomy/decompression (right approach) with transforaminal lumbar interbody fusion Dr. Ginny Urbina 19      Meralgia paresthetica     Sciatica     right side    Spinal stenosis of lumbar region with neurogenic claudication 2019    Spondylolisthesis of lumbar region 2019    Stroke [facility])     embolic    Synovial cyst of lumbar facet joint 2019    Trigger finger of left thumb 2020    Injected May 27, 2020, 2020. Past Surgical History:  Past Surgical History:   Procedure Laterality Date     SECTION      x2   ,     COLONOSCOPY      FL INJECTION LEFT HIP (ARTHROGRAM)  2023    HYSTEROSCOPY      NE ARTHRODESIS COMBINED TQ 1NTRSPC LUMBAR N/A 2019    Procedure: MIS Navigated L3-4 laminectomy/decompression (right approach) with transforaminal lumbar interbody fusion;  Surgeon: Cory Merida MD;  [facility];  Service: Neurosurgery    NE COLONOSCOPY FLX DX W/[facility] WHEN PFRMD N/A 2017    Procedure: COLONOSCOPY;  Surgeon: Armida Brock DO;  Location: [facility];   Service: Gastroenterology    NE TENDON SHEATH INCISION Left 2021    Procedure: Left thumb trigger finger release;  Surgeon: Rosa Lozada MD;  Location[facility];  Service: Orthopedics    TOE SURGERY Right 2021    big toe stiched and broken    TRIGGER FINGER RELEASE Left 2021    TUBAL LIGATION       Family History:  Family History   Problem Relation Age of Onset    Hyperlipidemia Mother     Heart disease Mother 64        alive in 80s    Stroke Mother     Depression Sister     Hypertension Sister     Cancer Maternal Grandfather     Hypertension Family     Stroke Family      Social History:  Social History     Substance and Sexual Activity   Alcohol Use Not Currently     Social History     Substance and Sexual Activity   Drug Use Never     Social History     Tobacco Use   Smoking Status Never   Smokeless Tobacco Never           ROS:  General: Per HPI  Skin: Negative, except if noted below  HEENT: Negative  Respiratory: Negative  Cardiovascular: Negative  Gastrointestinal: Negative  Urinary: Negative  Vascular: Negative  Musculoskeletal: Positive per HPI   Neurologic: Positive per HPI  Endocrine: Negative    Objective:  BP Readings from Last 1 Encounters:   10/24/23 136/85      Wt Readings from Last 1 Encounters:   10/24/23 56.2 kg (123 lb 12.8 oz)        Respiratory:   non-labored respirations    Lymphatics:  no palpable lymph nodes    Gait and Station:   Normal    Neurologic:   Alert and oriented times 3+  Patient with normal sensation except as noted below  Deep tendon reflexes 2+ except as noted in MSK exam    Bilateral Lower Extremity:  Left Hip     Inspection: Skin is intact without erythema or ecchymosis    Range of Motion: Full wo pain    - log roll    -Trendelenburg sign    Motor: 5/5 Q/HS/TA/GS/P    Pulses: 2+ DP / 2+ PT    SILT DP/SP/S/S/TN    Imaging:  My interpretation XR AP pelvis/ left hip: mild joint space narrowing, subchondral sclerosis, subchondral cysts, osteophyte formation. No fracture or dislocation. MRI Arthrogram left hip 8/16/23: Anterior superior labral tear with CAM deformity. Mild osteoarthritis. BMI:   Estimated body mass index is 21.93 kg/m² as calculated from the following:    Height as of this encounter: 5' 3" (1.6 m). Weight as of this encounter: 56.2 kg (123 lb 12.8 oz). BSA:   Estimated body surface area is 1.58 meters squared as calculated from the following:    Height as of this encounter: 5' 3" (1.6 m). Weight as of this encounter: 56.2 kg (123 lb 12.8 oz).            Scribe Attestation      I,:  Anupam Alarcon am acting as a scribe while in the presence of the attending physician.:       I,:  Joshua Vargas, DO personally performed the services described in this documentation    as scribed in my presence.:

## 2024-01-15 ENCOUNTER — PATIENT MESSAGE (OUTPATIENT)
Dept: FAMILY MEDICINE CLINIC | Facility: CLINIC | Age: 69
End: 2024-01-15

## 2024-02-21 PROBLEM — Z87.440 HISTORY OF UTI: Status: RESOLVED | Noted: 2020-10-21 | Resolved: 2024-02-21

## 2024-04-30 ENCOUNTER — TELEPHONE (OUTPATIENT)
Dept: FAMILY MEDICINE CLINIC | Facility: CLINIC | Age: 69
End: 2024-04-30

## 2024-04-30 NOTE — TELEPHONE ENCOUNTER
JAMARIM for pt to call back to reschedule her July 10, 2024 1:30 PM appt due to Dr. Anna's hours changing on Wednesday's starting July 1st.

## 2024-05-24 NOTE — PROGRESS NOTES
FAMILY PRACTICE OFFICE VISIT       NAME: Raymon Prader  AGE: 66 y o  SEX: female       : 1955        MRN: [id number]    DATE: 2018  TIME: 9:36 PM    Assessment and Plan     Problem List Items Addressed This Visit     Cervical spinal stenosis    Relevant Orders    CBC and differential    Ferritin    Iron    Iron Saturation %    Hyperlipidemia    Relevant Orders    TSH, 3rd generation with T4 reflex    Lipid Panel with Direct LDL reflex    Comprehensive metabolic panel    CT coronary calcium score    Iron deficiency anemia    Peripheral neuropathy     Patient appears to have a  neuropathy of her left lateral leg  She is not aware of any crushing injury, but at some point the nerve was probably compressed  She does not feel it would be necessary to do an EMG at this point and we will continue to monitor this clinically  I did check a B12  Relevant Orders    Vitamin B12    Stroke [facility])      Other Visit Diagnoses     Well adult exam    -  Primary    Need for hepatitis C screening test        Relevant Orders    Hepatitis C antibody          Peripheral neuropathy  Patient appears to have a  neuropathy of her left lateral leg  She is not aware of any crushing injury, but at some point the nerve was probably compressed  She does not feel it would be necessary to do an EMG at this point and we will continue to monitor this clinically  I did check a B12  Check a coronary calcium score for screening purposes in light of her very high cholesterol on family history of coronary disease  Continue on aspirin for history of stroke in   Other risk factors are extremely well controlled except for her high cholesterol  She declined statin therapy at this time, but she may want to consider red yeast rice in addition to decreasing her intake of saturated fats  Repeat some lipids at some point within the next 6 months or so      Patient Instructions   Peripheral neuropathy  Patient appears to have a monitor neuropathy of her left lateral leg  She is not aware of any crushing injury, but at some point the nerve was probably compressed  She does not feel it would be necessary to do an EMG at this point and we will continue to monitor this clinically  I did check a B12  Check a coronary calcium score for screening purposes in light of her very high cholesterol on family history of coronary disease  Continue on aspirin for history of stroke in 2011  Other risk factors are extremely well controlled except for her high cholesterol  She declined statin therapy at this time, but she may want to consider red yeast rice in addition to decreasing her intake of saturated fats  Repeat some lipids at some point within the next 6 months or so  Chief Complaint     Chief Complaint   Patient presents with    Physical Exam       History of Present Illness   Magdy Murray is a 66y o -year-old female who presents today for annual physical   She has a history of significant hyperlipidemia  10 year risk has calculated out low but her LDL is very high  She does have a history of some cardiovascular disease and a personal history of stroke  Her stroke occurred in 2011 and she had some difficulty with ambulating as well as difficulty with writing  This has improved which she still has a small notable deficit in these issues as well as typing  She was placed on aspirin at the time which she remains on  She continues to exercise vigorously without cardiovascular limitations  She does have some chronic low back issues and neck issues that flare up if she runs too much but she is able to swim  She has a history of some mild depression which tends to improve with exercise  She also complains of some occasional tingling down the lateral aspect of her left leg  This has been present for several years but seems to be worse over the past 3 or 4 months  She denies any acute injury    Review of Systems Review of Systems   Constitutional: Negative for appetite change, chills, fatigue, fever and unexpected weight change  HENT: Negative for trouble swallowing  Eyes: Negative for visual disturbance  Respiratory: Negative for chest tightness, shortness of breath and wheezing  Cardiovascular: Negative for chest pain  Gastrointestinal: Negative for abdominal distention, abdominal pain, blood in stool, constipation and diarrhea  Endocrine: Negative for polyuria  Genitourinary: Negative for difficulty urinating and flank pain  Musculoskeletal: Negative for arthralgias and myalgias  Skin: Negative for rash  Neurological: Negative for dizziness and light-headedness  Hematological: Negative for adenopathy  Does not bruise/bleed easily  Psychiatric/Behavioral: Negative for sleep disturbance  Active Problem List     Patient Active Problem List   Diagnosis    Allergic contact dermatitis due to other agents    Cervical spinal stenosis    Female stress incontinence    Hyperlipidemia    Iron deficiency anemia    Peripheral neuropathy    Stroke [facility])         Past Medical History:  Past Medical History:   Diagnosis Date    Depression     Depression     Hyperlipidemia     Hyperlipidemia     Iron deficiency anemia     Meralgia paresthetica     Sciatica     right side    Stroke ([address] )     embolic       Past Surgical History:  Past Surgical History:   Procedure Laterality Date     SECTION      COLONOSCOPY      HYSTEROSCOPY      [place] COLONOSCOPY FLX DX W/COLLJ SPEC WHEN PFRMD N/A 2017    Procedure: COLONOSCOPY;  Surgeon: Jacinta Martinez DO;  [facility];   Service: Gastroenterology    TUBAL LIGATION         Family History:  Family History   Problem Relation Age of Onset    Hyperlipidemia Mother     Heart disease Mother 64     alive in [de-identified]    Stroke Mother     Hypertension Family     Stroke Family     Depression Sister     Hypertension Sister        Social History:  Social History     Social History    Marital status:      Spouse name: N/A    Number of children: N/A    Years of education: N/A     Occupational History    Not on file  Social History Main Topics    Smoking status: Never Smoker    Smokeless tobacco: Never Used    Alcohol use Yes      Comment: socially    Drug use: Unknown    Sexual activity: Not on file     Other Topics Concern    Not on file     Social History Narrative    No narrative on file       Objective     Vitals:    02/12/18 1540   BP: 100/64   Pulse: 60   Resp: 16   SpO2: 97%     Wt Readings from Last 3 Encounters:   02/12/18 52 6 kg (116 lb)   12/08/17 52 4 kg (115 lb 8 oz)   11/20/17 53 2 kg (117 lb 4 oz)       Physical Exam   Constitutional: She is oriented to person, place, and time  She appears well-developed and well-nourished  No distress  HENT:   Head: Normocephalic  Eyes: Pupils are equal, round, and reactive to light  Neck: No tracheal deviation present  No thyromegaly present  Cardiovascular: Normal rate, regular rhythm and normal heart sounds  No murmur heard  Pulmonary/Chest: Effort normal  No respiratory distress  She has no wheezes  She has no rales  Abdominal: Soft  She exhibits no distension  There is no tenderness  Musculoskeletal: Normal range of motion  Neurological: She is alert and oriented to person, place, and time  No cranial nerve deficit  Skin: Skin is warm  She is not diaphoretic  Psychiatric: She has a normal mood and affect   Judgment and thought content normal        Pertinent Laboratory/Diagnostic Studies:  Lab Results   Component Value Date    BUN 13 05/12/2017    CREATININE 0 94 05/12/2017    CALCIUM 9 1 05/12/2017     05/12/2017    K 4 2 05/12/2017    CO2 30 05/12/2017     05/12/2017     Lab Results   Component Value Date    ALT 21 05/12/2017    AST 17 05/12/2017    ALKPHOS 79 05/12/2017    BILITOT 0 70 05/12/2017       Lab Results   Component Value Date WBC 5 52 05/12/2017    HGB 14 4 05/12/2017    HCT 43 1 05/12/2017    MCV 90 05/12/2017     05/12/2017       No results found for: TSH    Lab Results   Component Value Date    CHOL 313 (H) 05/12/2017     Lab Results   Component Value Date    TRIG 126 05/12/2017     Lab Results   Component Value Date    HDL 72 (H) 05/12/2017     Lab Results   Component Value Date    LDLCALC 216 (H) 05/12/2017     No results found for: HGBA1C    Results for orders placed or performed in visit on 12/12/17   Cytology (HISTORICAL)   Result Value Ref Range    ADDITIONAL INFORMATION None given     LMP (HISTORICAL) NONE GIVEN     PREV  PAP (HISTORICAL) NONE GIVEN     PREV  BX: (HISTORICAL) NONE GIVEN     SOURCE Cervix, Endocervix     Adequacy: (HISTORICAL)       Satisfactory for evaluation  Endocervical/transformation zone componentpresent  INTERPRETATION (HISTORICAL) Negative for intraepithelial lesion or malignancy  COMMENT       This Pap test has been evaluated with [employer] technology  CYTOTECHNOLOGIST: (HISTORICAL)       DKT, CT(ASCP)CT screening location: [facility], [id number]       Orders Placed This Encounter   Procedures    CT coronary calcium score    TSH, 3rd generation with T4 reflex    Lipid Panel with Direct LDL reflex    Comprehensive metabolic panel    Hepatitis C antibody    Vitamin B12    CBC and differential    Ferritin    Iron    Iron Saturation %       ALLERGIES:  Allergies   Allergen Reactions    Paxil [Paroxetine] Rash       Current Medications     Current Outpatient Prescriptions   Medication Sig Dispense Refill    ASPIRIN EC PO Take 325 mg by mouth daily      estradiol (ESTRACE VAGINAL) 0 1 mg/g vaginal cream Insert 1 g into the vagina       No current facility-administered medications for this visit            Health Maintenance     Health Maintenance   Topic Date Due    HIV SCREENING  1955    Hepatitis C Screening  1955    DTaP,Tdap,and Td Vaccines (1 - Tdap) 02/05/1964    Depression Screening PHQ-9  02/05/1969    PAP SMEAR  12/08/2020    COLONOSCOPY  08/07/2027    INFLUENZA VACCINE  Completed     Immunization History   Administered Date(s) Administered    Hep A, adult 01/01/2004    Hep B, adult 12/01/2004, 01/01/2005, 07/01/2005    Influenza 12/24/2015    Influenza Quadrivalent Preservative Free 3 years and older IM 10/20/2017    Influenza Quadrivalent, 6-35 Months IM 12/24/2015    Influenza TIV (IM) 11/30/2011, 10/04/2013       Romina Felton MD Performed

## 2024-06-03 DIAGNOSIS — E78.00 PURE HYPERCHOLESTEROLEMIA: ICD-10-CM

## 2024-06-03 RX ORDER — ROSUVASTATIN CALCIUM 5 MG/1
5 TABLET, COATED ORAL DAILY
Qty: 90 TABLET | Refills: 1 | Status: SHIPPED | OUTPATIENT
Start: 2024-06-03

## 2024-06-03 NOTE — TELEPHONE ENCOUNTER
Patient called to request a refill for their rosuvastatin 5 mg daily. Advised a refill was requested on 6/3/2024 and is pending approval. Patient verbalized understanding and is in agreement.

## 2024-06-05 ENCOUNTER — HOSPITAL ENCOUNTER (OUTPATIENT)
Dept: NEUROLOGY | Facility: CLINIC | Age: 69
Discharge: HOME | End: 2024-06-05
Payer: MEDICARE

## 2024-06-05 DIAGNOSIS — M47.812 CERVICAL SPONDYLOSIS: ICD-10-CM

## 2024-06-05 PROBLEM — G56.02 LEFT CARPAL TUNNEL SYNDROME: Status: ACTIVE | Noted: 2024-06-05

## 2024-06-05 PROCEDURE — 95913 NRV CNDJ TEST 13/> STUDIES: CPT | Performed by: PSYCHIATRY & NEUROLOGY

## 2024-06-05 PROCEDURE — 95886 MUSC TEST DONE W/N TEST COMP: CPT | Performed by: PSYCHIATRY & NEUROLOGY

## 2024-06-07 ENCOUNTER — OFFICE VISIT (OUTPATIENT)
Dept: OBGYN CLINIC | Facility: MEDICAL CENTER | Age: 69
End: 2024-06-07
Payer: MEDICARE

## 2024-06-07 VITALS
BODY MASS INDEX: 21.4 KG/M2 | HEART RATE: 50 BPM | SYSTOLIC BLOOD PRESSURE: 134 MMHG | HEIGHT: 63 IN | DIASTOLIC BLOOD PRESSURE: 81 MMHG | WEIGHT: 120.8 LBS

## 2024-06-07 DIAGNOSIS — M25.552 LEFT HIP PAIN: Primary | ICD-10-CM

## 2024-06-07 DIAGNOSIS — S73.192A TEAR OF LEFT ACETABULAR LABRUM, INITIAL ENCOUNTER: ICD-10-CM

## 2024-06-07 PROCEDURE — 99214 OFFICE O/P EST MOD 30 MIN: CPT | Performed by: ORTHOPAEDIC SURGERY

## 2024-06-07 NOTE — PROGRESS NOTES
"Orthopaedic Surgery - Office Note  Mariana Eckert (69 y.o. female)   : 1955   MRN: [id number]  Encounter Date: 2024    Assessment / Plan  Left hip labral tear with early arthritic changes     Referral given for US guided CSI of left hip  Encouraged her to continue with activity as tolerated  Reviewed images during the visit   Reviewed notes from Dr. Taylor   Ice, heat and anti-inflammatories prn   Follow-up:  Return if symptoms worsen or fail to improve.      Chief Complaint / Date of Onset  Left hip pain, began in early  with no injury  Injury Mechanism / Date  None  Surgery / Date  None    History of Present Illness   Mariana Eckert is a 69 y.o. female who presents for consultation of the left hip pain at the request of Dr. Taylor. She began having pain in early  with no inciting event. She describes this pain as being in her groin and only bothers her with prolonged walking. She is very active and notes ability to do the things she wants to do with no limitation, its just walking that bothers her. She does have a long history of lower back pain, she has a fusion of L3-L4. She denies any radiating symptoms or pain down her leg.     Treatment Summary  Medications / Modalities  Prednisone dose pack   Bracing / Immobilization  None  Physical Therapy  None  Injections  None  Prior Surgeries  Fusion of L3-L4  Other Treatments  None    Employment / Current Status  unknown    Sport / Organization / Current Status  Active, snorkeling and walking       Review of Systems  Pertinent items are noted in HPI.  All other systems were reviewed and are negative.      Physical Exam  /81   Pulse (!) 50   Ht 5' 3\" (1.6 m)   Wt 54.8 kg (120 lb 12.8 oz)   BMI 21.40 kg/m²   Cons: Appears well.  No apparent distress.  Psych: Alert. Oriented x3.  Mood and affect normal.  Eyes: PERRLA, EOMI  Resp: Normal effort.  No audible wheezing or stridor.  CV: Palpable pulse.  No discernable arrhythmia.  No LE " edema.  Lymph:  No palpable cervical, axillary, or inguinal lymphadenopathy.  Skin: Warm.  No palpable masses.  No visible lesions.  Neuro: Normal muscle tone.  Normal and symmetric DTR's.     Left Hip Exam  Alignment / Posture:  Normal resting hip posture.  Inspection:  No swelling. No ecchymosis.  Palpation:   mild groin tenderness. No effusion.  ROM:  Hip Flexion 120. Hip ER 60. Hip IR 30.  Strength:  5/5 hip flexors and abductors.  Stability:  No objective hip instability.  Tests:  (+) DONALDIR. (-) Stinchfield. (-) TAMMY.  Neurovascular:  Sensation intact in DP/SP/Valdez/Sa/T nerve distributions. 2+ DP & PT pulses.  Gait:  Normal.       Studies Reviewed  I have personally reviewed pertinent films in PACS.  XR of left hip - images from 06/09/2023 showing no abnormalities or fractures  MRI arthrogram of left hip - images from 08/16/2023 showing labral tear      Procedures  No procedures today.    Medical, Surgical, Family, and Social History  The patient's medical history, family history, and social history, were reviewed and updated as appropriate.    Past Medical History:   Diagnosis Date    Cervical ca (HCC) 1979    Chronic bilateral low back pain 11/23/2018    Depression     Depression     Depression     Dermatitis 05/27/2020    Hyperlipidemia     Hyperlipidemia     Iron deficiency anemia     Laceration of left thumb 10/21/2020    Low back pain     Lumbar radiculopathy 01/28/2019    S/p MIS Navigated L3-4 laminectomy/decompression (right approach) with transforaminal lumbar interbody fusion Dr. Zhang 4/8/19      Meralgia paresthetica     Sciatica     right side    Spinal stenosis of lumbar region with neurogenic claudication 01/28/2019    Spondylolisthesis of lumbar region 03/08/2019    Stroke ([facility])     embolic    Synovial cyst of lumbar facet joint 01/28/2019    Trigger finger of left thumb 04/30/2020    Injected May 27, 2020, October 21, 2020.        Past Surgical History:   Procedure Laterality Date      SECTION      x2   ,     COLONOSCOPY      FL INJECTION LEFT HIP (ARTHROGRAM)  2023    HYSTEROSCOPY      TN ARTHRODESIS COMBINED TQ 1NTRSPC LUMBAR N/A 2019    Procedure: MIS Navigated L3-4 laminectomy/decompression (right approach) with transforaminal lumbar interbody fusion;  Surgeon: Enrique Zhang MD;  Location:  MAIN OR;  Service: Neurosurgery    TN COLONOSCOPY FLX DX W/COLLJ SPEC WHEN PFRMD N/A 2017    Procedure: COLONOSCOPY;  Surgeon: Deidra Aj DO;  Location: [facility] GI LAB;  Service: Gastroenterology    TN TENDON SHEATH INCISION Left 2021    Procedure: Left thumb trigger finger release;  Surgeon: Camilo Bucio MD;  [facility];  Service: Orthopedics    TOE SURGERY Right 2021    big toe stiched and broken    TRIGGER FINGER RELEASE Left 2021    TUBAL LIGATION         Family History   Problem Relation Age of Onset    Hyperlipidemia Mother     Heart disease Mother 61        alive in 80s    Stroke Mother     Depression Sister     Hypertension Sister     Cancer Maternal Grandfather     Hypertension Family     Stroke Family        Social History     Occupational History    Occupation:    Tobacco Use    Smoking status: Never    Smokeless tobacco: Never   Vaping Use    Vaping status: Never Used   Substance and Sexual Activity    Alcohol use: Not Currently    Drug use: Never    Sexual activity: Yes     Birth control/protection: Post-menopausal       Allergies   Allergen Reactions    Paxil [Paroxetine] Rash         Current Outpatient Medications:     aspirin 325 mg tablet, Take 325 mg by mouth daily, Disp: , Rfl:     estradiol (ESTRACE VAGINAL) 0.1 mg/g vaginal cream, Insert 1 g into the vagina once a week 1 gram vaginally twice weekly, Disp: 85 g, Rfl: 2    rosuvastatin (CRESTOR) 5 mg tablet, TAKE ONE TABLET BY MOUTH EVERY DAY, Disp: 90 tablet, Rfl: 1    predniSONE 10 mg tablet, Take 1 tablet (10 mg total) by mouth daily 6 x 2 days, 5  x 2 days, 4 x 2 days, 3 x 2 days, 2 x 2 days, 1 x 2 days, Disp: 42 tablet, Rfl: 0      Jannet Owens    Scribe Attestation      I,:   am acting as a scribe while in the presence of the attending physician.:       I,:   personally performed the services described in this documentation    as scribed in my presence.:

## 2024-07-02 ENCOUNTER — RA CDI HCC (OUTPATIENT)
Dept: OTHER | Facility: HOSPITAL | Age: 69
End: 2024-07-02

## 2024-07-02 NOTE — PROGRESS NOTES
HCC coding opportunities       Chart reviewed, no opportunity found: CHART REVIEWED, NO OPPORTUNITY FOUND        Patients Insurance        Commercial Insurance: [employer]

## 2024-07-09 ENCOUNTER — HOSPITAL ENCOUNTER (OUTPATIENT)
Dept: RADIOLOGY | Facility: MEDICAL CENTER | Age: 69
Discharge: HOME | End: 2024-07-09
Payer: MEDICARE

## 2024-07-09 ENCOUNTER — TELEPHONE (OUTPATIENT)
Dept: FAMILY MEDICINE CLINIC | Facility: CLINIC | Age: 69
End: 2024-07-09

## 2024-07-09 VITALS
HEART RATE: 47 BPM | SYSTOLIC BLOOD PRESSURE: 152 MMHG | DIASTOLIC BLOOD PRESSURE: 84 MMHG | OXYGEN SATURATION: 100 % | TEMPERATURE: 97.4 F | RESPIRATION RATE: 18 BRPM

## 2024-07-09 DIAGNOSIS — M25.552 LEFT HIP PAIN: ICD-10-CM

## 2024-07-09 DIAGNOSIS — S73.192A TEAR OF LEFT ACETABULAR LABRUM, INITIAL ENCOUNTER: ICD-10-CM

## 2024-07-09 PROCEDURE — 20610 DRAIN/INJ JOINT/BURSA W/O US: CPT | Performed by: PHYSICAL MEDICINE & REHABILITATION

## 2024-07-09 PROCEDURE — 77002 NEEDLE LOCALIZATION BY XRAY: CPT | Performed by: PHYSICAL MEDICINE & REHABILITATION

## 2024-07-09 PROCEDURE — 77002 NEEDLE LOCALIZATION BY XRAY: CPT

## 2024-07-09 RX ORDER — ROPIVACAINE HYDROCHLORIDE 2 MG/ML
3 INJECTION, SOLUTION EPIDURAL; INFILTRATION; PERINEURAL ONCE
Status: COMPLETED | OUTPATIENT
Start: 2024-07-09 | End: 2024-07-09

## 2024-07-09 RX ORDER — METHYLPREDNISOLONE ACETATE 40 MG/ML
40 INJECTION, SUSPENSION INTRA-ARTICULAR; INTRALESIONAL; INTRAMUSCULAR; PARENTERAL; SOFT TISSUE ONCE
Status: COMPLETED | OUTPATIENT
Start: 2024-07-09 | End: 2024-07-09

## 2024-07-09 RX ADMIN — ROPIVACAINE HYDROCHLORIDE 3 ML: 2 INJECTION, SOLUTION EPIDURAL; INFILTRATION; PERINEURAL at 09:12

## 2024-07-09 RX ADMIN — IOHEXOL 2 ML: 300 INJECTION, SOLUTION INTRAVENOUS at 09:12

## 2024-07-09 RX ADMIN — METHYLPREDNISOLONE ACETATE 40 MG: 40 INJECTION, SUSPENSION INTRA-ARTICULAR; INTRALESIONAL; INTRAMUSCULAR; PARENTERAL; SOFT TISSUE at 09:12

## 2024-07-09 NOTE — DISCHARGE INSTRUCTIONS

## 2024-07-09 NOTE — H&P
History of Present Illness: The patient is a 69 y.o. female who presents with complaints of let hip pain    Past Medical History:   Diagnosis Date    Cervical ca (HCC) 1979    Chronic bilateral low back pain 2018    Depression     Depression     Depression     Dermatitis 2020    Hyperlipidemia     Hyperlipidemia     Iron deficiency anemia     Laceration of left thumb 10/21/2020    Low back pain     Lumbar radiculopathy 2019    S/p MIS Navigated L3-4 laminectomy/decompression (right approach) with transforaminal lumbar interbody fusion Dr. Zhang 19      Meralgia paresthetica     Sciatica     right side    Spinal stenosis of lumbar region with neurogenic claudication 2019    Spondylolisthesis of lumbar region 2019    Stroke (HCC)     embolic    Synovial cyst of lumbar facet joint 2019    Trigger finger of left thumb 2020    Injected May 27, 2020, 2020.        Past Surgical History:   Procedure Laterality Date     SECTION      x2   ,     COLONOSCOPY      FL INJECTION LEFT HIP (ARTHROGRAM)  2023    HYSTEROSCOPY      [place] ARTHRODESIS COMBINED TQ 1NTRSPC LUMBAR N/A 2019    Procedure: MIS Navigated L3-4 laminectomy/decompression (right approach) with transforaminal lumbar interbody fusion;  Surgeon: Enrique Zhang MD;  Location: [facility];  Service: Neurosurgery    NY COLONOSCOPY FLX DX W/COLLJ SPEC WHEN PFRMD N/A 2017    Procedure: COLONOSCOPY;  Surgeon: Deidra Aj DO;  Location: [facility];  Service: Gastroenterology    NY TENDON SHEATH INCISION Left 2021    Procedure: Left thumb trigger finger release;  Surgeon: Camilo Bucio MD;  Location: [facility];  Service: Orthopedics    TOE SURGERY Right 2021    big toe stiched and broken    TRIGGER FINGER RELEASE Left 2021    TUBAL LIGATION           Current Outpatient Medications:     aspirin 325 mg tablet, Take 325 mg by mouth daily, Disp: , Rfl:      estradiol (ESTRACE VAGINAL) 0.1 mg/g vaginal cream, Insert 1 g into the vagina once a week 1 gram vaginally twice weekly, Disp: 85 g, Rfl: 2    rosuvastatin (CRESTOR) 5 mg tablet, TAKE ONE TABLET BY MOUTH EVERY DAY, Disp: 90 tablet, Rfl: 1    Allergies   Allergen Reactions    Paxil [Paroxetine] Rash       Physical Exam:   Vitals:    07/09/24 0855   BP: 149/89   Pulse: (!) 47   Resp: 20   Temp: (!) 97.4 °F (36.3 °C)   SpO2: 98%     General: Awake, Alert, Oriented x 3, Mood and affect appropriate  Respiratory: Respirations even and unlabored  Cardiovascular: Peripheral pulses intact; no edema  Musculoskeletal Exam: left hip pain    ASA Score: 2    Patient/Chart Verification  Patient ID Verified: Verbal  ID Band Applied: No  Consents Confirmed: Procedural, To be obtained in the Pre-Procedure area  H&P( within 30 days) Verified: To be obtained in the Pre-Procedure area  Interval H&P(within 24 hr) Complete (required for Outpatients and Surgery Admit only): To be obtained in the Pre-Procedure area  Allergies Reviewed: Yes  Anticoag/NSAID held?: NA  Currently on antibiotics?: No    Assessment:   1. Tear of left acetabular labrum, initial encounter    2. Left hip pain        Plan: left hip us guided CSI

## 2024-07-10 ENCOUNTER — APPOINTMENT (OUTPATIENT)
Dept: RADIOLOGY | Facility: MEDICAL CENTER | Age: 69
End: 2024-07-10
Payer: MEDICARE

## 2024-07-10 ENCOUNTER — OFFICE VISIT (OUTPATIENT)
Dept: FAMILY MEDICINE CLINIC | Facility: CLINIC | Age: 69
End: 2024-07-10
Payer: MEDICARE

## 2024-07-10 VITALS
WEIGHT: 122 LBS | HEIGHT: 63 IN | HEART RATE: 56 BPM | OXYGEN SATURATION: 97 % | BODY MASS INDEX: 21.62 KG/M2 | DIASTOLIC BLOOD PRESSURE: 82 MMHG | SYSTOLIC BLOOD PRESSURE: 128 MMHG

## 2024-07-10 DIAGNOSIS — M79.645 CHRONIC PAIN OF LEFT THUMB: Primary | ICD-10-CM

## 2024-07-10 DIAGNOSIS — E78.00 PURE HYPERCHOLESTEROLEMIA: ICD-10-CM

## 2024-07-10 DIAGNOSIS — Z86.73 HISTORY OF CVA (CEREBROVASCULAR ACCIDENT): ICD-10-CM

## 2024-07-10 DIAGNOSIS — N39.3 FEMALE STRESS INCONTINENCE: ICD-10-CM

## 2024-07-10 DIAGNOSIS — N18.31 STAGE 3A CHRONIC KIDNEY DISEASE (HCC): ICD-10-CM

## 2024-07-10 DIAGNOSIS — F51.01 PRIMARY INSOMNIA: ICD-10-CM

## 2024-07-10 DIAGNOSIS — G89.29 CHRONIC PAIN OF LEFT THUMB: ICD-10-CM

## 2024-07-10 DIAGNOSIS — G89.29 CHRONIC PAIN OF LEFT THUMB: Primary | ICD-10-CM

## 2024-07-10 DIAGNOSIS — Z86.2 HISTORY OF ANEMIA: ICD-10-CM

## 2024-07-10 DIAGNOSIS — Z00.00 MEDICARE ANNUAL WELLNESS VISIT, SUBSEQUENT: ICD-10-CM

## 2024-07-10 DIAGNOSIS — G56.02 LEFT CARPAL TUNNEL SYNDROME: ICD-10-CM

## 2024-07-10 DIAGNOSIS — M79.645 CHRONIC PAIN OF LEFT THUMB: ICD-10-CM

## 2024-07-10 DIAGNOSIS — M54.12 CERVICAL RADICULOPATHY AT C6: ICD-10-CM

## 2024-07-10 DIAGNOSIS — H91.92 HEARING LOSS OF LEFT EAR, UNSPECIFIED HEARING LOSS TYPE: ICD-10-CM

## 2024-07-10 DIAGNOSIS — Z87.440 HISTORY OF UTI: ICD-10-CM

## 2024-07-10 PROCEDURE — G0438 PPPS, INITIAL VISIT: HCPCS | Performed by: FAMILY MEDICINE

## 2024-07-10 PROCEDURE — 99214 OFFICE O/P EST MOD 30 MIN: CPT | Performed by: FAMILY MEDICINE

## 2024-07-10 PROCEDURE — 73130 X-RAY EXAM OF HAND: CPT

## 2024-07-10 RX ORDER — CIPROFLOXACIN 500 MG/1
500 TABLET, FILM COATED ORAL EVERY 12 HOURS SCHEDULED
Qty: 20 TABLET | Refills: 1 | Status: SHIPPED | OUTPATIENT
Start: 2024-07-10 | End: 2024-07-20

## 2024-07-10 RX ORDER — ZOLPIDEM TARTRATE 5 MG/1
10 TABLET ORAL
Qty: 4 TABLET | Refills: 0 | Status: SHIPPED | OUTPATIENT
Start: 2024-07-10

## 2024-07-10 NOTE — ASSESSMENT & PLAN NOTE
She is having persistent radicular symptoms down her left arm.  Fortunately, there is no notable weakness at this time.  She has tried home stretching for several months as well as multiple exercises.  She does avoid NSAIDs for the most part.  I reached out to Dr. Camacho of our pain management team and he was kind enough to review her note.  He has recommended a repeat MRI prior to intervention.  Check MRI C-spine and refer to pain management.  Orders:    Ambulatory referral to Spine & Pain Management; Future    MRI cervical spine wo contrast; Future

## 2024-07-10 NOTE — ASSESSMENT & PLAN NOTE
She avoids NSAIDs.  Continue routine monitoring.    Orders:    CBC and differential; Future    Comprehensive metabolic panel; Future    Lipid Panel with Direct LDL reflex; Future

## 2024-07-10 NOTE — ASSESSMENT & PLAN NOTE
Devorah coronary calcium score done.  This has been ordered and I will check labs prior to her follow-up as well.  Orders:    CT coronary calcium score; Future

## 2024-07-10 NOTE — PROGRESS NOTES
Ambulatory Visit  Name: Mariana Eckert      : 1955      MRN: [id number]  Encounter Provider: Oscar Sheehan Jr, MD  Encounter Date: 7/10/2024   Encounter department: [facility]    Assessment & Plan  Medicare annual wellness visit, subsequent  She is doing well overall.  Vaccines are up-to-date.  She is up-to-date on cancer screening as well.       Chronic pain of left thumb  She looks to have some degenerative arthritis of her thumb and I am going to have her see our orthopedic team.  Orders:    Ambulatory Referral to Orthopedic Surgery; Future    Left carpal tunnel syndrome  She has notable carpal tunnel syndrome on her recent EMG.  This is minimally symptomatic.  I am having her see our hand team for her arthritis and she can certainly speak to them about any potential need for correction.  Orders:    Ambulatory Referral to Orthopedic Surgery; Future    Pure hypercholesterolemia  [place] coronary calcium score done.  This has been ordered and I will check labs prior to her follow-up as well.  Orders:    CT coronary calcium score; Future    History of CVA (cerebrovascular accident)  Remain on aspirin as well as rosuvastatin       Stage 3a chronic kidney disease (HCC)  She avoids NSAIDs.  Continue routine monitoring.    Orders:    CBC and differential; Future    Comprehensive metabolic panel; Future    Lipid Panel with Direct LDL reflex; Future    Cervical radiculopathy at C6  She is having persistent radicular symptoms down her left arm.  Fortunately, there is no notable weakness at this time.  She has tried home stretching for several months as well as multiple exercises.  She does avoid NSAIDs for the most part.  I reached out to Dr. Camacho of our pain management team and he was kind enough to review her note.  He has recommended a repeat MRI prior to intervention.  Check MRI C-spine and refer to pain management.  Orders:    Ambulatory referral to Spine & Pain Management;  Future    MRI cervical spine wo contrast; Future    History of anemia  This is stable at this time.  Continue routine monitoring.       Primary insomnia  She is traveling to [facility] and would like medication for the plane.  I prescribed a few Ambien to use as needed.  This has worked well for her in the past.  Orders:    zolpidem (AMBIEN) 5 mg tablet; Take 2 tablets (10 mg total) by mouth daily at bedtime as needed for sleep    History of UTI  I prescribed her Cipro 500 mg which she should take twice daily for at least 3 days for UTI.  I did give her extra doses as this can be effective for sinusitis or significant travelers diarrhea as well.    Orders:    ciprofloxacin (CIPRO) 500 mg tablet; Take 1 tablet (500 mg total) by mouth every 12 (twelve) hours for 10 days    Female stress incontinence    Orders:    Ambulatory referral to Urogynecology; Future    Hearing loss of left ear, unspecified hearing loss type    Orders:    Ambulatory Referral to Otolaryngology; Future          Preventive health issues were discussed with patient, and age appropriate screening tests were ordered as noted in patient's After Visit Summary. Personalized health advice and appropriate referrals for health education or preventive services given if needed, as noted in patient's After Visit Summary.    History of Present Illness     HPI patient presents today for an annual wellness as well as a follow-up for chronic health issues.  She has a few concerns.  She has had some decreased hearing in her left ear which started a few months ago after swimming.  She felt it was just swimmer's ear but it has never really resolved.  She is having a lot of pain in her left thumb proximally.  She denies any acute injury but she remains very active.  She sails down to [place] every year which requires a lot of physical activity.  She does continue to have some urinary incontinence which is becoming increasingly bothersome.  She notes this is typically  with coughing or sneezing.  She has a history of stroke while on OCPs and does remain on statin therapy as well as aspirin.  Patient Care Team:  Oscar Anna Jr., MD as PCP - General  Deidra Aj DO as Endoscopist    Review of Systems   Constitutional:  Negative for appetite change, chills, fatigue, fever and unexpected weight change.   HENT:  Negative for trouble swallowing.         She notes decreased hearing in her left ear for the past several months.  She believes it started after swimming.   Eyes:  Negative for visual disturbance.   Respiratory:  Negative for cough, chest tightness, shortness of breath and wheezing.    Cardiovascular:  Negative for chest pain, palpitations and leg swelling.   Gastrointestinal:  Negative for abdominal distention, abdominal pain, blood in stool, constipation and diarrhea.   Endocrine: Negative for polyuria.   Genitourinary:  Negative for difficulty urinating, flank pain, frequency and urgency.   Musculoskeletal:  Positive for arthralgias, neck pain and neck stiffness. Negative for back pain and myalgias.   Skin:  Negative for rash.   Neurological:  Negative for dizziness and light-headedness.   Hematological:  Negative for adenopathy. Does not bruise/bleed easily.   Psychiatric/Behavioral:  Negative for dysphoric mood and sleep disturbance. The patient is not nervous/anxious.      Medical History Reviewed by provider this encounter:       Annual Wellness Visit Questionnaire       Health Risk Assessment:   Patient rates overall health as good. Patient feels that their physical health rating is slightly worse. Patient is very satisfied with their life. Eyesight was rated as slightly worse. Hearing was rated as slightly worse. Patient feels that their emotional and mental health rating is same. Patients states they are never, rarely angry. Patient states they are sometimes unusually tired/fatigued. Pain experienced in the last 7 days has been some. Patient's pain  rating has been 5/10. Patient states that she has experienced no weight loss or gain in last 6 months.     Depression Screening:   PHQ-2 Score: 0      Fall Risk Screening:   In the past year, patient has experienced: no history of falling in past year      Urinary Incontinence Screening:   Patient has leaked urine accidently in the last six months.     Home Safety:  Patient does not have trouble with stairs inside or outside of their home. Patient has working smoke alarms and has working carbon monoxide detector. Home safety hazards include: none.     Nutrition:   Current diet is Regular and Limited junk food.     Medications:   Patient is not currently taking any over-the-counter supplements. Patient is able to manage medications.     Activities of Daily Living (ADLs)/Instrumental Activities of Daily Living (IADLs):   Walk and transfer into and out of bed and chair?: Yes  Dress and groom yourself?: Yes    Bathe or shower yourself?: Yes    Feed yourself? Yes  Do your laundry/housekeeping?: Yes  Manage your money, pay your bills and track your expenses?: Yes  Make your own meals?: Yes    Do your own shopping?: Yes    Previous Hospitalizations:   Any hospitalizations or ED visits within the last 12 months?: No      Advance Care Planning:   Living will: No    Durable POA for healthcare: No    Advanced directive: No    Advanced directive counseling given: Yes      Cognitive Screening:   Provider or family/friend/caregiver concerned regarding cognition?: No    PREVENTIVE SCREENINGS      Cardiovascular Screening:    General: Screening Not Indicated and History Lipid Disorder      Diabetes Screening:     General: Screening Current      Colorectal Cancer Screening:     General: Screening Current      Breast Cancer Screening:     General: Screening Current      Cervical Cancer Screening:    General: History Cervical Cancer      Osteoporosis Screening:    General: Screening Current      Abdominal Aortic Aneurysm (AAA)  Screening:        General: Screening Not Indicated      Lung Cancer Screening:     General: Screening Not Indicated      Hepatitis C Screening:    General: Screening Current    Screening, Brief Intervention, and Referral to Treatment (SBIRT)    Screening  Typical number of drinks in a day: 0  Typical number of drinks in a week: 0  Interpretation: Low risk drinking behavior.    AUDIT-C Screenin) How often did you have a drink containing alcohol in the past year? monthly or less  2) How many drinks did you have on a typical day when you were drinking in the past year? 1 to 2  3) How often did you have 6 or more drinks on one occasion in the past year? never    AUDIT-C Score: 1  Interpretation: Score 0-2 (female): Negative screen for alcohol misuse    Single Item Drug Screening:  How often have you used an illegal drug (including marijuana) or a prescription medication for non-medical reasons in the past year? never    Single Item Drug Screen Score: 0  Interpretation: Negative screen for possible drug use disorder    Brief Intervention  Alcohol & drug use screenings were reviewed. No concerns regarding substance use disorder identified.     Social Determinants of Health     Financial Resource Strain: Low Risk  (2023)    Overall Financial Resource Strain (CARDIA)     Difficulty of Paying Living Expenses: Not hard at all   Food Insecurity: No Food Insecurity (2024)    Hunger Vital Sign     Worried About Running Out of Food in the Last Year: Never true     Ran Out of Food in the Last Year: Never true   Transportation Needs: No Transportation Needs (2024)    PRAPARE - Transportation     Lack of Transportation (Medical): No     Lack of Transportation (Non-Medical): No   Housing Stability: Low Risk  (2024)    Housing Stability Vital Sign     Unable to Pay for Housing in the Last Year: No     Number of Times Moved in the Last Year: 0     Homeless in the Last Year: No   Utilities: Not At Risk (2024)     "[facility] Utilities     Threatened with loss of utilities: No     No results found.    Objective     /82   Pulse 56   Ht 5' 3\" (1.6 m)   Wt 55.3 kg (122 lb)   SpO2 97%   BMI 21.61 kg/m²     Physical Exam  Constitutional:       General: She is not in acute distress.     Appearance: She is well-developed. She is not diaphoretic.   HENT:      Head: Normocephalic.      Right Ear: External ear normal.      Left Ear: External ear normal.      Nose: Nose normal.   Eyes:      General: No scleral icterus.        Right eye: No discharge.         Left eye: No discharge.      Conjunctiva/sclera: Conjunctivae normal.      Pupils: Pupils are equal, round, and reactive to light.   Neck:      Thyroid: No thyromegaly.      Trachea: No tracheal deviation.   Cardiovascular:      Rate and Rhythm: Normal rate and regular rhythm.      Heart sounds: Normal heart sounds. No murmur heard.  Pulmonary:      Effort: Pulmonary effort is normal. No respiratory distress.      Breath sounds: Normal breath sounds. No stridor. No wheezing, rhonchi or rales.   Abdominal:      General: Bowel sounds are normal. There is no distension.      Palpations: Abdomen is soft.      Tenderness: There is no abdominal tenderness. There is no guarding.   Musculoskeletal:         General: Tenderness (Tenderness at her left first MCP joint.  There are some degenerative changes present.   strength is intact.) present. No deformity. Normal range of motion.      Right lower leg: No edema.      Left lower leg: No edema.      Comments: She has pain with full flexion or extension of her cervical spine.  She does get recurrence of radicular symptoms with full extension intermittently.  She has no notable weakness in the musculature of her arms bilaterally.   Lymphadenopathy:      Cervical: No cervical adenopathy.   Skin:     General: Skin is warm.      Coloration: Skin is not pale.      Findings: No erythema or rash.   Neurological:      Cranial Nerves: No cranial " nerve deficit.      Coordination: Coordination normal.   Psychiatric:         Mood and Affect: Mood normal.         Behavior: Behavior normal.         Thought Content: Thought content normal.

## 2024-07-10 NOTE — ASSESSMENT & PLAN NOTE
She has notable carpal tunnel syndrome on her recent EMG.  This is minimally symptomatic.  I am having her see our hand team for her arthritis and she can certainly speak to them about any potential need for correction.  Orders:    Ambulatory Referral to Orthopedic Surgery; Future

## 2024-07-10 NOTE — ASSESSMENT & PLAN NOTE
She looks to have some degenerative arthritis of her thumb and I am going to have her see our orthopedic team.  Orders:    Ambulatory Referral to Orthopedic Surgery; Future

## 2024-07-10 NOTE — ASSESSMENT & PLAN NOTE
I prescribed her Cipro 500 mg which she should take twice daily for at least 3 days for UTI.  I did give her extra doses as this can be effective for sinusitis or significant travelers diarrhea as well.    Orders:    ciprofloxacin (CIPRO) 500 mg tablet; Take 1 tablet (500 mg total) by mouth every 12 (twelve) hours for 10 days

## 2024-07-11 ENCOUNTER — TELEPHONE (OUTPATIENT)
Age: 69
End: 2024-07-11

## 2024-07-11 NOTE — TELEPHONE ENCOUNTER
Patient stated she called [facility] to schedule appts for the 4 referrals given by Dr Anna. They were able to schedule all but one; stated referral for incontinence to Dr Bhatia was not in her chart. I advised that I see it in her chart and I could give her the number to call office directly. She stated she was driving and will look at referral when she gets home.

## 2024-07-17 ENCOUNTER — TELEPHONE (OUTPATIENT)
Dept: FAMILY MEDICINE CLINIC | Facility: CLINIC | Age: 69
End: 2024-07-17

## 2024-07-22 ENCOUNTER — HOSPITAL ENCOUNTER (OUTPATIENT)
Dept: CT IMAGING | Facility: HOSPITAL | Age: 69
Discharge: HOME | End: 2024-07-22
Payer: COMMERCIAL

## 2024-07-22 DIAGNOSIS — E78.00 PURE HYPERCHOLESTEROLEMIA: ICD-10-CM

## 2024-07-22 PROCEDURE — 75571 CT HRT W/O DYE W/CA TEST: CPT

## 2024-07-23 ENCOUNTER — HOSPITAL ENCOUNTER (OUTPATIENT)
Facility: MEDICAL CENTER | Age: 69
Discharge: HOME | End: 2024-07-23
Payer: MEDICARE

## 2024-07-23 ENCOUNTER — OFFICE VISIT (OUTPATIENT)
Dept: OBGYN CLINIC | Facility: MEDICAL CENTER | Age: 69
End: 2024-07-23
Payer: MEDICARE

## 2024-07-23 ENCOUNTER — TELEPHONE (OUTPATIENT)
Dept: PAIN MEDICINE | Facility: CLINIC | Age: 69
End: 2024-07-23

## 2024-07-23 VITALS
SYSTOLIC BLOOD PRESSURE: 138 MMHG | BODY MASS INDEX: 21.97 KG/M2 | WEIGHT: 124 LBS | HEIGHT: 63 IN | HEART RATE: 61 BPM | DIASTOLIC BLOOD PRESSURE: 95 MMHG

## 2024-07-23 DIAGNOSIS — M18.12 PRIMARY OSTEOARTHRITIS OF FIRST CARPOMETACARPAL JOINT OF LEFT HAND: Primary | ICD-10-CM

## 2024-07-23 DIAGNOSIS — M65.4 TENOSYNOVITIS, DE QUERVAIN: ICD-10-CM

## 2024-07-23 DIAGNOSIS — G89.29 CHRONIC PAIN OF LEFT THUMB: ICD-10-CM

## 2024-07-23 DIAGNOSIS — M54.12 CERVICAL RADICULOPATHY AT C6: ICD-10-CM

## 2024-07-23 DIAGNOSIS — M79.645 CHRONIC PAIN OF LEFT THUMB: ICD-10-CM

## 2024-07-23 DIAGNOSIS — R20.2 NUMBNESS AND TINGLING IN BOTH HANDS: ICD-10-CM

## 2024-07-23 DIAGNOSIS — R20.0 NUMBNESS AND TINGLING IN BOTH HANDS: ICD-10-CM

## 2024-07-23 PROCEDURE — 72141 MRI NECK SPINE W/O DYE: CPT

## 2024-07-23 PROCEDURE — 99214 OFFICE O/P EST MOD 30 MIN: CPT | Performed by: ORTHOPAEDIC SURGERY

## 2024-07-23 PROCEDURE — 20600 DRAIN/INJ JOINT/BURSA W/O US: CPT | Performed by: ORTHOPAEDIC SURGERY

## 2024-07-23 RX ORDER — BETAMETHASONE SODIUM PHOSPHATE AND BETAMETHASONE ACETATE 3; 3 MG/ML; MG/ML
3 INJECTION, SUSPENSION INTRA-ARTICULAR; INTRALESIONAL; INTRAMUSCULAR; SOFT TISSUE
Status: COMPLETED | OUTPATIENT
Start: 2024-07-23 | End: 2024-07-23

## 2024-07-23 RX ORDER — BUPIVACAINE HYDROCHLORIDE 2.5 MG/ML
0.5 INJECTION, SOLUTION INFILTRATION; PERINEURAL
Status: COMPLETED | OUTPATIENT
Start: 2024-07-23 | End: 2024-07-23

## 2024-07-23 RX ADMIN — BETAMETHASONE SODIUM PHOSPHATE AND BETAMETHASONE ACETATE 3 MG: 3; 3 INJECTION, SUSPENSION INTRA-ARTICULAR; INTRALESIONAL; INTRAMUSCULAR; SOFT TISSUE at 15:00

## 2024-07-23 RX ADMIN — BUPIVACAINE HYDROCHLORIDE 0.5 ML: 2.5 INJECTION, SOLUTION INFILTRATION; PERINEURAL at 15:00

## 2024-07-23 NOTE — PROGRESS NOTES
The HAND & UPPER EXTREMITY OFFICE VISIT   Referred By:  Oscar Sheehan Jr., Md  [address] [place] [address]  [address]  [place] [address]      Chief Complaint:     Let thumb pain    History of Present Illness:   69 y.o., Right hand dominant female presents with atraumatic chronic left thumb pain. significantly worsened over the last few weeks with increased activities.  [place] but returns to [facility] yearly to visit family and for medical care etc.  States she does not trust the doctors in [place] where she talks about or the [place].     Pain is noted at base of thumb and thumb side of her wrist.  Increased pain with sewing and opening jars recently.  Has tried soft braces previously without significant benefit.  She denies any other treatments aside from occasional OTC medications.      Also complains of intermittent numbness and tingling to the bilateral hands which she relates to prior neck injury.  She states that those symptoms resolved when she does her neck exercises.        ADLs: Community ambulator  Smoke: denies ETOH: denies   Drugs:  denies Job: Retired       Past Medical History:  Past Medical History:   Diagnosis Date    Cervical ca (HCC) 1979    Chronic bilateral low back pain 11/23/2018    Depression     Depression     Depression     Dermatitis 05/27/2020    Hyperlipidemia     Hyperlipidemia     Iron deficiency anemia     Laceration of left thumb 10/21/2020    Low back pain     Lumbar radiculopathy 01/28/2019    S/p MIS Navigated L3-4 laminectomy/decompression (right approach) with transforaminal lumbar interbody fusion Dr. Zhang 4/8/19      Meralgia paresthetica     Sciatica     right side    Spinal stenosis of lumbar region with neurogenic claudication 01/28/2019    Spondylolisthesis of lumbar region 03/08/2019    Stroke ([facility])     embolic    Synovial cyst of lumbar facet joint 01/28/2019    Trigger finger of left thumb 04/30/2020    Injected  May 27, 2020, 2020.      Past Surgical History:   Procedure Laterality Date     SECTION      x2   ,     COLONOSCOPY      FL INJECTION LEFT HIP (ARTHROGRAM)  2023    HYSTEROSCOPY      UT ARTHRODESIS COMBINED TQ 1NTRSPC LUMBAR N/A 2019    Procedure: MIS Navigated L3-4 laminectomy/decompression (right approach) with transforaminal lumbar interbody fusion;  Surgeon: Enrique Zhang MD;  [facility];  Service: Neurosurgery    UT COLONOSCOPY FLX DX W/COLLJ SPEC WHEN PFRMD N/A 2017    Procedure: COLONOSCOPY;  Surgeon: Deidra Aj DO;  Location: [facility] GI LAB;  Service: Gastroenterology    UT TENDON SHEATH INCISION Left 2021    Procedure: Left thumb trigger finger release;  Surgeon: Camilo Bucio MD;  Location: [facility];  Service: Orthopedics    TOE SURGERY Right 2021    big toe stiched and broken    TRIGGER FINGER RELEASE Left 2021    TUBAL LIGATION       Family History   Problem Relation Age of Onset    Hyperlipidemia Mother     Heart disease Mother 61        alive in 80s    Stroke Mother     Depression Sister     Hypertension Sister     Cancer Maternal Grandfather     Hypertension Family     Stroke Family      Social History     Socioeconomic History    Marital status:      Spouse name: Not on file    Number of children: 2    Years of education: Bachelors degree    Highest education level: Not on file   Occupational History    Occupation:    Tobacco Use    Smoking status: Never    Smokeless tobacco: Never   Vaping Use    Vaping status: Never Used   Substance and Sexual Activity    Alcohol use: Not Currently    Drug use: Never    Sexual activity: Yes     Birth control/protection: Post-menopausal   Other Topics Concern    Not on file   Social History Narrative    Not on file     Social Determinants of Health     Financial Resource Strain: Low Risk  (2023)    Overall Financial Resource Strain (CARDIA)     Difficulty  "of Paying Living Expenses: Not hard at all   Food Insecurity: No Food Insecurity (7/9/2024)    Hunger Vital Sign     Worried About Running Out of Food in the Last Year: Never true     Ran Out of Food in the Last Year: Never true   Transportation Needs: No Transportation Needs (7/9/2024)    PRAPARE - Transportation     Lack of Transportation (Medical): No     Lack of Transportation (Non-Medical): No   Physical Activity: Not on file   Stress: Not on file   Social Connections: Not on file   Intimate Partner Violence: Not on file   Housing Stability: Low Risk  (7/9/2024)    Housing Stability Vital Sign     Unable to Pay for Housing in the Last Year: No     Number of Times Moved in the Last Year: 0     Homeless in the Last Year: No     Scheduled Meds:  Continuous Infusions:No current facility-administered medications for this visit.    PRN Meds:.  Allergies   Allergen Reactions    Paxil [Paroxetine] Rash           Physical Examination:    /95   Pulse 61   Ht 5' 3\" (1.6 m)   Wt 56.2 kg (124 lb)   BMI 21.97 kg/m²     Gen: A&Ox3, NAD  Cardiac: regular rate  Chest: non labored breathing  Abdomen: Non-distended    Cervical: Negative Spurling's bilaterally    Left upper Extremity:  Skin CDI  No obvious deformity of the shoulder, arm, elbow, forearm, wrist, hand  Mildly tender at the thumb CMC joint positive pressure shear test  Tender over the first dorsal compartment, negative Finkelstein's  Sensation intact to light touch in the axillary median, ulnar, and radial nerve distributions  Negative Tinel's at wrist or elbow, negative Durkan's, negative elbow flexion compression test  5/5 motor thumb abduction, opposition, interosseous, some pain with resisted thumb abduction  2+RP      Right upper Extremity:  Skin CDI  No obvious deformity of the shoulder, arm, elbow, forearm, wrist, hand  Nontender  Sensation intact to light touch in the axillary median, ulnar, and radial nerve distributions  Negative Tinel's at wrist " or elbow, negative Durkan's, negative elbow flexion compression test  5/5 motor thumb abduction, opposition, interosseous  2+RP    Studies:  Radiographs: I personally reviewed and independently interpreted the available radiographs.  7/10/2024: Radiographs of the Left thumb, multiple views, demonstrate mild degenerative disease of the thumb CMC joint as well as at the thumb IP joint.  No fractures or dislocations.  Carpal alignment within normal limits      Assessment and Plan:  1. Primary osteoarthritis of first carpometacarpal joint of left hand  Durable Medical Equipment    Small joint arthrocentesis: L thumb CMC      2. Chronic pain of left thumb  Ambulatory Referral to Orthopedic Surgery      3. Numbness and tingling in both hands  Ambulatory Referral to Orthopedic Surgery      4. Tenosynovitis, de Quervain  Durable Medical Equipment          69 y.o. female presents with signs and symptoms consistent with the above diagnosis.  We discussed the natural history of this condition and its pathogenesis.  We discussed operative and nonoperative treatment options.    On exam today she has symptoms of both thumb CMC arthritis as well as de Quervain's tendinitis.  Primarily thumb CMC arthritis seems to be the majority of her pain based on her clinical exam and history.  We discussed treatment options including immobilization, oral and topical medications, therapy, corticosteroid injections and surgery.  We discussed different types of thumb bracing including CMC stabilization braces such as a Comfort Cool or push MetaGrip brace but she has tried these in the past no relief.  She like to try a more rigid brace.  A thumb spica brace was provided today.  She can wear this to sleep or as needed during the day.  She would also like to trial a corticosteroid injection to the thumb CMC joint.  Risks, benefits and alternative treatments were discussed with the patient. The risks of injection include but are not limited to:  bleeding, infection, damage to nerves, vessels or tendons, allergic reaction to agents, possible increase in pain, tendon or ligament rupture, weakening of bone or soft tissues, and/or elevation in blood sugar. Patient understands and would like to proceed with the proposed procedure. Injection was tolerated well. Please see procedure note for details. May take NSAIDs/Tylenol or apply ice to the area as needed for post-injection discomfort.     She can use oral and topical medications as needed.  We discussed monitoring her symptoms after the injection to see how much they improve.  It is possible she still has a component of de Quervain's tendinitis which may not resolve completely with this corticosteroid injection.  If it does fail to do so she will follow-up in September when she returns to the area and we can discuss a corticosteroid injection into the first dorsal compartment at that time if needed.    For her bilateral hand numbness and tingling this does seem most consistent with cervical radiculopathy given her ability to resolve it with neck exercises and lack of any provocative signs on exam today.  We discussed potentially further evaluating this with imaging but the patient declined as she is confident it is coming from her neck and she is not interested in further treatment.    It is recommended they Return in 7 weeks (on 9/10/2024).  she expressed understanding of the plan and agreed. We encouraged them to contact our office with any questions or concerns.         Curry Benton MD  Hand and Upper Extremity Surgery    Small joint arthrocentesis: L thumb CMC  [place] Protocol:  Consent: Verbal consent obtained.  Risks and benefits: risks, benefits and alternatives were discussed  Consent given by: patient  Timeout called at: 7/23/2024 3:02 PM.  Patient understanding: patient states understanding of the procedure being performed  Patient identity confirmed: verbally with patient  Supporting  Documentation  Indications: pain   Procedure Details  Location: thumb - L thumb CMC  Needle size: 25 G  Ultrasound guidance: no  Approach: dorsal  Medications administered: 3 mg betamethasone acetate-betamethasone sodium phosphate 6 (3-3) mg/mL; 0.5 mL bupivacaine 0.25 %    Patient tolerance: patient tolerated the procedure well with no immediate complications  Dressing:  Sterile dressing applied            *This note was dictated using Dragon voice recognition software. Please excuse any word substitutions or errors.*

## 2024-08-01 ENCOUNTER — TELEPHONE (OUTPATIENT)
Age: 69
End: 2024-08-01

## 2024-08-01 NOTE — TELEPHONE ENCOUNTER
Patient called stating she knew Dr Anna placed a referral for her hearing test but needed the name and number, provided

## 2024-08-09 PROBLEM — Z87.440 HISTORY OF UTI: Status: RESOLVED | Noted: 2020-10-21 | Resolved: 2024-08-09

## 2024-09-10 ENCOUNTER — HOSPITAL ENCOUNTER (OUTPATIENT)
Dept: MAMMOGRAPHY | Facility: MEDICAL CENTER | Age: 69
Discharge: HOME | End: 2024-09-10
Payer: MEDICARE

## 2024-09-10 VITALS — BODY MASS INDEX: 21.95 KG/M2 | WEIGHT: 123.9 LBS | HEIGHT: 63 IN

## 2024-09-10 DIAGNOSIS — Z12.31 VISIT FOR SCREENING MAMMOGRAM: ICD-10-CM

## 2024-09-10 PROCEDURE — 77063 BREAST TOMOSYNTHESIS BI: CPT

## 2024-09-10 PROCEDURE — 77067 SCR MAMMO BI INCL CAD: CPT

## 2024-09-12 ENCOUNTER — OFFICE VISIT (OUTPATIENT)
Dept: OBGYN CLINIC | Facility: MEDICAL CENTER | Age: 69
End: 2024-09-12
Payer: MEDICARE

## 2024-09-12 VITALS
SYSTOLIC BLOOD PRESSURE: 137 MMHG | DIASTOLIC BLOOD PRESSURE: 84 MMHG | HEIGHT: 63 IN | BODY MASS INDEX: 21.79 KG/M2 | HEART RATE: 47 BPM | WEIGHT: 123 LBS

## 2024-09-12 DIAGNOSIS — M18.12 PRIMARY OSTEOARTHRITIS OF FIRST CARPOMETACARPAL JOINT OF LEFT HAND: ICD-10-CM

## 2024-09-12 DIAGNOSIS — G89.29 CHRONIC PAIN OF LEFT THUMB: ICD-10-CM

## 2024-09-12 DIAGNOSIS — M79.645 CHRONIC PAIN OF LEFT THUMB: ICD-10-CM

## 2024-09-12 DIAGNOSIS — M65.4 TENOSYNOVITIS, DE QUERVAIN: Primary | ICD-10-CM

## 2024-09-12 PROCEDURE — 99213 OFFICE O/P EST LOW 20 MIN: CPT | Performed by: ORTHOPAEDIC SURGERY

## 2024-09-12 PROCEDURE — 20550 NJX 1 TENDON SHEATH/LIGAMENT: CPT | Performed by: ORTHOPAEDIC SURGERY

## 2024-09-12 RX ORDER — LIDOCAINE HYDROCHLORIDE 10 MG/ML
1 INJECTION, SOLUTION INFILTRATION; PERINEURAL
Status: COMPLETED | OUTPATIENT
Start: 2024-09-12 | End: 2024-09-12

## 2024-09-12 RX ORDER — BETAMETHASONE SODIUM PHOSPHATE AND BETAMETHASONE ACETATE 3; 3 MG/ML; MG/ML
6 INJECTION, SUSPENSION INTRA-ARTICULAR; INTRALESIONAL; INTRAMUSCULAR; SOFT TISSUE
Status: COMPLETED | OUTPATIENT
Start: 2024-09-12 | End: 2024-09-12

## 2024-09-12 RX ADMIN — BETAMETHASONE SODIUM PHOSPHATE AND BETAMETHASONE ACETATE 6 MG: 3; 3 INJECTION, SUSPENSION INTRA-ARTICULAR; INTRALESIONAL; INTRAMUSCULAR; SOFT TISSUE at 08:15

## 2024-09-12 RX ADMIN — LIDOCAINE HYDROCHLORIDE 1 ML: 10 INJECTION, SOLUTION INFILTRATION; PERINEURAL at 08:15

## 2024-09-12 NOTE — PROGRESS NOTES
HAND & UPPER EXTREMITY OFFICE VISIT   Referred By:  No referring provider defined for this encounter.      Chief Complaint:     Left thumb pain    Previous History:   Left thumb CMC steroid injection with one week relief.    Interval History:  The patient presents for follow up of left hand.  She is s/p left thumb CMC steroid injection with limited benefit. Today she complains of left base of thumb pain.  Using hand aggravates while rest alleviates.  She continues use of thumb spica brace with limited benefit.  She denies medications for this issue.  She denies past left hand surgery.      Past Medical History:  Past Medical History:   Diagnosis Date    Cervical ca (HCC) 1979    Chronic bilateral low back pain 2018    Depression     Depression     Depression     Dermatitis 2020    Hyperlipidemia     Hyperlipidemia     Iron deficiency anemia     Laceration of left thumb 10/21/2020    Low back pain     Lumbar radiculopathy 2019    S/p MIS Navigated L3-4 laminectomy/decompression (right approach) with transforaminal lumbar interbody fusion Dr. Zhang 19      Meralgia paresthetica     Sciatica     right side    Spinal stenosis of lumbar region with neurogenic claudication 2019    Spondylolisthesis of lumbar region 2019    Stroke ([facility])     embolic    Synovial cyst of lumbar facet joint 2019    Trigger finger of left thumb 2020    Injected May 27, 2020, 2020.      Past Surgical History:   Procedure Laterality Date     SECTION      x2   ,     COLONOSCOPY      FL INJECTION LEFT HIP (ARTHROGRAM)  2023    HYSTEROSCOPY      MI ARTHRODESIS COMBINED TQ 1NTRSPC LUMBAR N/A 2019    Procedure: MIS Navigated L3-4 laminectomy/decompression (right approach) with transforaminal lumbar interbody fusion;  Surgeon: Enrique Zhang MD;  [facility];  Service: Neurosurgery    MI COLONOSCOPY FLX DX W/COLLJ SPEC WHEN PFRMD N/A 2017     Procedure: COLONOSCOPY;  Surgeon: Deidra Aj DO;  Location: [facility] GI LAB;  Service: Gastroenterology    VA TENDON SHEATH INCISION Left 09/14/2021    Procedure: Left thumb trigger finger release;  Surgeon: Camilo Bucio MD;  Location:  MAIN OR;  Service: Orthopedics    TOE SURGERY Right 07/2021    big toe stiched and broken    TRIGGER FINGER RELEASE Left 09/2021    TUBAL LIGATION       Family History   Problem Relation Age of Onset    Hyperlipidemia Mother     Heart disease Mother 61        alive in 80s    Stroke Mother     Depression Sister     Hypertension Sister     Cancer Maternal Grandfather     Hypertension Family     Stroke Family      Social History     Socioeconomic History    Marital status:      Spouse name: Not on file    Number of children: 2    Years of education: Bachelors degree    Highest education level: Not on file   Occupational History    Occupation:    Tobacco Use    Smoking status: Never    Smokeless tobacco: Never   Vaping Use    Vaping status: Never Used   Substance and Sexual Activity    Alcohol use: Not Currently    Drug use: Never    Sexual activity: Yes     Birth control/protection: Post-menopausal   Other Topics Concern    Not on file   Social History Narrative    Not on file     Social Determinants of Health     Financial Resource Strain: Low Risk  (6/8/2023)    Overall Financial Resource Strain (CARDIA)     Difficulty of Paying Living Expenses: Not hard at all   Food Insecurity: No Food Insecurity (7/9/2024)    Hunger Vital Sign     Worried About Running Out of Food in the Last Year: Never true     Ran Out of Food in the Last Year: Never true   Transportation Needs: No Transportation Needs (7/9/2024)    PRAPARE - Transportation     Lack of Transportation (Medical): No     Lack of Transportation (Non-Medical): No   Physical Activity: Not on file   Stress: Not on file   Social Connections: Not on file   Intimate Partner Violence: Not on file  "  Housing Stability: Low Risk  (7/9/2024)    Housing Stability Vital Sign     Unable to Pay for Housing in the Last Year: No     Number of Times Moved in the Last Year: 0     Homeless in the Last Year: No     Scheduled Meds:  Continuous Infusions:No current facility-administered medications for this visit.    PRN Meds:.  Allergies   Allergen Reactions    Paxil [Paroxetine] Rash       Physical Examination:    /84   Pulse (!) 47   Ht 5' 3\" (1.6 m)   Wt 55.8 kg (123 lb)   BMI 21.79 kg/m²     Gen: A&Ox3, NAD  Cardiac: regular rate  Chest: non labored breathing  Abdomen: Non-distended      Left upper Extremity:  Skin CDI  No obvious deformity of the shoulder, arm, elbow, forearm, wrist, hand  Mildly tender at the thumb CMC joint positive pressure shear test  Tender over the first dorsal compartment, negative Finkelstein's  Sensation intact to light touch in the axillary median, ulnar, and radial nerve distributions  Negative Tinel's at wrist or elbow, negative Durkan's, negative elbow flexion compression test  5/5 motor thumb abduction, opposition, interosseous, some pain with resisted thumb abduction  2+RP        Right upper Extremity:  Skin CDI  No obvious deformity of the shoulder, arm, elbow, forearm, wrist, hand  Nontender  Sensation intact to light touch in the axillary median, ulnar, and radial nerve distributions  Negative Tinel's at wrist or elbow, negative Durkan's, negative elbow flexion compression test  5/5 motor thumb abduction, opposition, interosseous  2+RP    Studies:  None performed today       Assessment and Plan:  1. Tenosynovitis, de Quervain  Medium joint arthrocentesis      2. Chronic pain of left thumb        3. Primary osteoarthritis of first carpometacarpal joint of left hand            69 y.o. female presents in follow up for the above diagnosis.        The patient continues to have intermittent right radial wrist and base of thumb pain.  She found one week of partial relief following " left thumb CMC steroid injection.  She has been using thumb spica brace with some benefit.  On exam she does have tenderness over 1st CMC along with positive grind test.  Despite negative Finkelsteins test she has mild tenderness over 1st Dorsal compartment.  She was provided with 1st dorsal compartment steroid injection in which she tolerated well.  She was provided with Comfort Cool brace.  She can also obtain a Push MetaGrip brace if desired.  She should follow up in about 6 weeks prior to her sailing trip.      she expressed understanding of the plan and agreed. We encouraged them to contact our office with any questions or concerns.       Curry Benton MD  Hand and Upper Extremity Surgery      Hand/upper extremity injection: L extensor compartment 1  Universal Protocol:  Consent: Verbal consent obtained.  Risks and benefits: risks, benefits and alternatives were discussed  Consent given by: patient  Patient identity confirmed: verbally with patient  Supporting Documentation  Indications: pain   Procedure Details  Condition:de Quervain's tenosynovitis Site: L extensor compartment 1   Preparation: Patient was prepped and draped in the usual sterile fashion  Needle size: 25 G  Ultrasound guidance: no  Approach: radial  Medications administered: 1 mL lidocaine 1 %; 6 mg betamethasone acetate-betamethasone sodium phosphate 6 (3-3) mg/mL  Patient tolerance: patient tolerated the procedure well with no immediate complications  Dressing:  Sterile dressing applied    Procedure: Corticosteroid Injection  First Dorsal Compartment     The nature of and the indications for a corticosteroid and/or local anaesthetic injection were reviewed in detail with the patient today.  The inherent risks of injection including infection, allergic reaction, increased pain, incomplete relief or temporary relief of symptoms, alterations of blood glucose levels requiring careful monitoring and treatment as indicated, tendon, ligament or  "articular cartilage rupture or degeneration, were discussed.  The patient elected to proceed with the injection.  The injection was performed under sterile conditions after informed consent was obtained from the patient and the correct injection site was confirmed with the patient.      Left upper extremity was injected at the first dorsal compartment of the wrist with 2 cc of a 1:1 solution of \"Celestone (6mg/cc) and 1% Xylocaine without epinepherine. A sterile band-aide was applied.  The patient tolerated the injection well and was discharged without complication.  Post-injection protocol for activity modification, anti-inflammatory measures, and follow-up was reviewed with the patient.                   *This note was dictated using Dragon voice recognition software. Please excuse any word substitutions or errors.*    "

## 2024-10-22 ENCOUNTER — OFFICE VISIT (OUTPATIENT)
Dept: OBGYN CLINIC | Facility: MEDICAL CENTER | Age: 69
End: 2024-10-22
Payer: MEDICARE

## 2024-10-22 VITALS
HEART RATE: 57 BPM | BODY MASS INDEX: 21.79 KG/M2 | SYSTOLIC BLOOD PRESSURE: 127 MMHG | HEIGHT: 63 IN | WEIGHT: 123 LBS | DIASTOLIC BLOOD PRESSURE: 75 MMHG

## 2024-10-22 DIAGNOSIS — M18.12 PRIMARY OSTEOARTHRITIS OF FIRST CARPOMETACARPAL JOINT OF LEFT HAND: Primary | ICD-10-CM

## 2024-10-22 DIAGNOSIS — E78.00 PURE HYPERCHOLESTEROLEMIA: ICD-10-CM

## 2024-10-22 PROCEDURE — 20600 DRAIN/INJ JOINT/BURSA W/O US: CPT | Performed by: ORTHOPAEDIC SURGERY

## 2024-10-22 PROCEDURE — 99213 OFFICE O/P EST LOW 20 MIN: CPT | Performed by: ORTHOPAEDIC SURGERY

## 2024-10-22 RX ORDER — ROPIVACAINE HYDROCHLORIDE 2 MG/ML
0.5 INJECTION, SOLUTION EPIDURAL; INFILTRATION; PERINEURAL
Status: COMPLETED | OUTPATIENT
Start: 2024-10-22 | End: 2024-10-22

## 2024-10-22 RX ORDER — BETAMETHASONE SODIUM PHOSPHATE AND BETAMETHASONE ACETATE 3; 3 MG/ML; MG/ML
3 INJECTION, SUSPENSION INTRA-ARTICULAR; INTRALESIONAL; INTRAMUSCULAR; SOFT TISSUE
Status: COMPLETED | OUTPATIENT
Start: 2024-10-22 | End: 2024-10-22

## 2024-10-22 RX ADMIN — BETAMETHASONE SODIUM PHOSPHATE AND BETAMETHASONE ACETATE 3 MG: 3; 3 INJECTION, SUSPENSION INTRA-ARTICULAR; INTRALESIONAL; INTRAMUSCULAR; SOFT TISSUE at 14:15

## 2024-10-22 RX ADMIN — ROPIVACAINE HYDROCHLORIDE 0.5 ML: 2 INJECTION, SOLUTION EPIDURAL; INFILTRATION; PERINEURAL at 14:15

## 2024-10-22 NOTE — PROGRESS NOTES
HAND & UPPER EXTREMITY OFFICE VISIT   Referred By:  No referring provider defined for this encounter.      Chief Complaint:     Left thumb pain    Previous History:   Last seen on  and had CSI of the first dorsal compartment.     Interval History:  Since the last visit she reports continued pain in the base of the thumb. She denies any relief following the previous de Quervain's injection but she did have good relief for about a week after the CMC joint injection. Her pain is aggravated by activities including opening a jar. She has been wearing a removable wrist brace at night. She also recently purchased a soft brace for the hand which has been causing her increased pain.     She is leaving [facility] next week to scale back to the [place] until May.    Past Medical History:  Past Medical History:   Diagnosis Date    Cervical ca (HCC) 1979    Chronic bilateral low back pain 2018    Depression     Depression     Depression     Dermatitis 2020    Hyperlipidemia     Hyperlipidemia     Iron deficiency anemia     Laceration of left thumb 10/21/2020    Low back pain     Lumbar radiculopathy 2019    S/p MIS Navigated L3-4 laminectomy/decompression (right approach) with transforaminal lumbar interbody fusion Dr. Zhang 19      Meralgia paresthetica     Sciatica     right side    Spinal stenosis of lumbar region with neurogenic claudication 2019    Spondylolisthesis of lumbar region 2019    Stroke ([facility])     embolic    Synovial cyst of lumbar facet joint 2019    Trigger finger of left thumb 2020    Injected May 27, 2020, 2020.      Past Surgical History:   Procedure Laterality Date     SECTION      x2   ,     COLONOSCOPY      FL INJECTION LEFT HIP (ARTHROGRAM)  2023    HYSTEROSCOPY      OR ARTHRODESIS COMBINED TQ 1NTRSPC LUMBAR N/A 2019    Procedure: MIS Navigated L3-4 laminectomy/decompression (right approach) with transforaminal  lumbar interbody fusion;  Surgeon: Enrique Zhang MD;  [facility];  Service: Neurosurgery    AL COLONOSCOPY FLX DX W/COLLJ SPEC WHEN PFRMD N/A 08/07/2017    Procedure: COLONOSCOPY;  Surgeon: Deidra Aj DO;  Location: [facility] GI LAB;  Service: Gastroenterology    AL TENDON SHEATH INCISION Left 09/14/2021    Procedure: Left thumb trigger finger release;  Surgeon: Camilo Bucio MD;  [facility];  Service: Orthopedics    TOE SURGERY Right 07/2021    big toe stiched and broken    TRIGGER FINGER RELEASE Left 09/2021    TUBAL LIGATION       Family History   Problem Relation Age of Onset    Hyperlipidemia Mother     Heart disease Mother 61        alive in 80s    Stroke Mother     Depression Sister     Hypertension Sister     Cancer Maternal Grandfather     Hypertension Family     Stroke Family      Social History     Socioeconomic History    Marital status:      Spouse name: Not on file    Number of children: 2    Years of education: Bachelors degree    Highest education level: Not on file   Occupational History    Occupation:    Tobacco Use    Smoking status: Never    Smokeless tobacco: Never   Vaping Use    Vaping status: Never Used   Substance and Sexual Activity    Alcohol use: Not Currently    Drug use: Never    Sexual activity: Yes     Birth control/protection: Post-menopausal   Other Topics Concern    Not on file   Social History Narrative    Not on file     Social Determinants of Health     Financial Resource Strain: Low Risk  (6/8/2023)    Overall Financial Resource Strain (CARDIA)     Difficulty of Paying Living Expenses: Not hard at all   Food Insecurity: No Food Insecurity (7/9/2024)    Nursing - Inadequate Food Risk Classification     Worried About Running Out of Food in the Last Year: Never true     Ran Out of Food in the Last Year: Never true     Ran Out of Food in the Last Year: Not on file   Transportation Needs: No Transportation Needs (7/9/2024)     "PRAPARE - Transportation     Lack of Transportation (Medical): No     Lack of Transportation (Non-Medical): No   Physical Activity: Not on file   Stress: Not on file   Social Connections: Not on file   Intimate Partner Violence: Not on file   Housing Stability: Low Risk  (7/9/2024)    Housing Stability Vital Sign     Unable to Pay for Housing in the Last Year: No     Number of Times Moved in the Last Year: 0     Homeless in the Last Year: No     Scheduled Meds:  Continuous Infusions:No current facility-administered medications for this visit.    PRN Meds:.  Allergies   Allergen Reactions    Paxil [Paroxetine] Rash       Physical Examination:    /75   Pulse 57   Ht 5' 3\" (1.6 m)   Wt 55.8 kg (123 lb)   BMI 21.79 kg/m²     Gen: A&Ox3, NAD  Cardiac: regular rate  Chest: non labored breathing  Abdomen: Non-distended    Left upper Extremity:  Skin CDI  No obvious deformity of the shoulder, arm, elbow, forearm, wrist, hand  tender at the thumb CMC joint, positive pressure shear test  Non-tender first dorsal compartment  Negative Finkelstein's  Sensation intact to light touch in the axillary median, ulnar, and radial nerve distributions  5/5 motor thumb abduction, opposition, interosseous, some pain with resisted thumb abduction and opposition  2+RP     Studies:  No new imaging to review      Assessment and Plan:  1. Primary osteoarthritis of first carpometacarpal joint of left hand  Small joint arthrocentesis            69 y.o. female presents in follow up for the above diagnosis. Unfortunately she did not gain any noticeable relief following the previous de Quervain's injection.  She does report having some short term relief following CSI of the thumb CMC joint on 7/23. We discussed additional treatment options going forward including repeat CSI of the thumb CMC joint, continued bracing and activity modification, or surgery. We reviewed surgical options including CMC arthroplasty or denervation. We reviewed the " risks and benefits of the procedure and anticipated recovery period. She is leaving for a sailing trip in 1 week and will not be back until the end of May. She would like to try a repeat injection in the office today to try to provide some relief prior to her sailing trip. Risks, benefits and alternative treatments were discussed with the patient. These risks of injection include but are not limited to: bleeding, infection, allergic reaction to agents, possible increase in pain, and/or elevation in blood sugar. Patient understands and would like to proceed with the proposed procedure. Patient tolerated the procedure well without any complications. See procedure note for details. she may take NSAIDs/Tylenol or apply ice to the area as needed for post-injection discomfort. It is recommended she return to the office as needed if her symptoms persist or worsen.       she expressed understanding of the plan and agreed. We encouraged them to contact our office with any questions or concerns.       Curry Benton MD  Hand and Upper Extremity Surgery      Small joint arthrocentesis: L thumb CMC  Lutz Protocol:  procedure performed by consultantConsent: Verbal consent obtained.  Risks and benefits: risks, benefits and alternatives were discussed  Consent given by: patient  Patient understanding: patient states understanding of the procedure being performed  Site marked: the operative site was marked  Radiology Images displayed and confirmed. If images not available, report reviewed: imaging studies available  Required items: required blood products, implants, devices, and special equipment available  Patient identity confirmed: verbally with patient  Supporting Documentation  Indications: pain   Procedure Details  Location: thumb - L thumb CMC  Preparation: Patient was prepped and draped in the usual sterile fashion  Needle size: 25 G  Ultrasound guidance: no  Approach: dorsal  Medications administered: 3 mg  betamethasone acetate-betamethasone sodium phosphate 6 (3-3) mg/mL; 0.5 mL ropivacaine 0.2 %    Patient tolerance: patient tolerated the procedure well with no immediate complications  Dressing:  Sterile dressing applied            *This note was dictated using Dragon voice recognition software. Please excuse any word substitutions or errors.*    Scribe Attestation      I,:  Jannet Lange PA-C am acting as a scribe while in the presence of the attending physician.:       I,:  Curry Benton MD personally performed the services described in this documentation    as scribed in my presence.:

## 2024-10-23 ENCOUNTER — ANNUAL EXAM (OUTPATIENT)
Dept: GYNECOLOGY | Facility: CLINIC | Age: 69
End: 2024-10-23
Payer: MEDICARE

## 2024-10-23 VITALS
HEART RATE: 57 BPM | WEIGHT: 125 LBS | BODY MASS INDEX: 22.15 KG/M2 | SYSTOLIC BLOOD PRESSURE: 127 MMHG | DIASTOLIC BLOOD PRESSURE: 75 MMHG | HEIGHT: 63 IN

## 2024-10-23 DIAGNOSIS — Z12.11 ENCOUNTER FOR SCREENING COLONOSCOPY: ICD-10-CM

## 2024-10-23 DIAGNOSIS — Z01.419 ENCOUNTER FOR GYNECOLOGICAL EXAMINATION WITHOUT ABNORMAL FINDING: Primary | ICD-10-CM

## 2024-10-23 DIAGNOSIS — N95.2 ATROPHIC VAGINITIS: ICD-10-CM

## 2024-10-23 PROCEDURE — G0101 CA SCREEN;PELVIC/BREAST EXAM: HCPCS | Performed by: OBSTETRICS & GYNECOLOGY

## 2024-10-23 PROCEDURE — G0145 SCR C/V CYTO,THINLAYER,RESCR: HCPCS | Performed by: OBSTETRICS & GYNECOLOGY

## 2024-10-23 RX ORDER — ESTRADIOL 0.1 MG/G
1 CREAM VAGINAL WEEKLY
Qty: 85 G | Refills: 2 | Status: SHIPPED | OUTPATIENT
Start: 2024-10-23

## 2024-10-23 RX ORDER — ROSUVASTATIN CALCIUM 5 MG/1
5 TABLET, COATED ORAL DAILY
Qty: 90 TABLET | Refills: 1 | Status: SHIPPED | OUTPATIENT
Start: 2024-10-23

## 2024-10-23 NOTE — PROGRESS NOTES
"Ambulatory Visit  Name: Mariana Eckert      : 1955      MRN: [id number]  Encounter Provider: Efra Wheatley DO  Encounter Date: 10/23/2024   Encounter department: [facility]    Assessment & Plan  Encounter for screening colonoscopy    Orders:    Ambulatory Referral to Gastroenterology; Future    Encounter for gynecological examination without abnormal finding         Atrophic vaginitis    Orders:    estradiol (ESTRACE VAGINAL) 0.1 mg/g vaginal cream; Insert 1 g into the vagina once a week 1 gram vaginally twice weekly      History of Present Illness     Mariana Eckert is a 69 y.o. female who presents  for annual examination.  She offers no complaints.  Denies any vaginal irritation, burning, discharge or bleeding.  Denies any dysuria, hematuria urgency urinary incontinence.  No GI complaints.  Patient has been using Estrace cream 1 g vaginal weekly.    Patient recently had bulking agents with urogynecology    Colonoscopy 2017.  Normal.  Repeat in 5 years secondary to family history    DEXA scan 2023.  Osteopenia      Review of Systems   Constitutional: Negative.    HENT:  Negative for sore throat and trouble swallowing.    Gastrointestinal: Negative.    Genitourinary: Negative.            Objective     /75   Pulse 57   Ht 5' 3\" (1.6 m)   Wt 56.7 kg (125 lb)   BMI 22.14 kg/m²     Physical Exam  Vitals reviewed.   Constitutional:       Appearance: Normal appearance.   Cardiovascular:      Rate and Rhythm: Normal rate and regular rhythm.      Pulses: Normal pulses.      Heart sounds: Normal heart sounds. No murmur heard.  Pulmonary:      Effort: Pulmonary effort is normal. No respiratory distress.      Breath sounds: Normal breath sounds.   Chest:   Breasts:     Right: No swelling, bleeding, inverted nipple, mass, nipple discharge, skin change or tenderness.      Left: No swelling, bleeding, inverted nipple, mass, nipple discharge, skin change or " tenderness.   Abdominal:      General: There is no distension.      Palpations: Abdomen is soft. There is no mass.      Tenderness: There is no abdominal tenderness. There is no guarding or rebound.      Hernia: No hernia is present. There is no hernia in the left inguinal area or right inguinal area.   Genitourinary:     General: Normal vulva.      Labia:         Right: No rash, tenderness or lesion.         Left: No rash, tenderness or lesion.       Vagina: Normal.      Cervix: Normal.      Uterus: Normal.       Adnexa:         Right: No mass, tenderness or fullness.          Left: No mass, tenderness or fullness.        Comments: Urethral orifice normal.  Bartholin's and Jeanerette's glands normal.  No cystocele or rectocele.  Musculoskeletal:      Cervical back: Normal range of motion and neck supple. No tenderness.   Lymphadenopathy:      Cervical: No cervical adenopathy.      Upper Body:      Right upper body: No supraclavicular, axillary or pectoral adenopathy.      Left upper body: No supraclavicular, axillary or pectoral adenopathy.      Lower Body: No right inguinal adenopathy. No left inguinal adenopathy.   Neurological:      Mental Status: She is alert.

## 2024-10-30 LAB
LAB AP GYN PRIMARY INTERPRETATION: NORMAL
Lab: NORMAL

## 2025-06-05 DIAGNOSIS — E78.00 PURE HYPERCHOLESTEROLEMIA: ICD-10-CM

## 2025-06-05 RX ORDER — ROSUVASTATIN CALCIUM 5 MG/1
5 TABLET, COATED ORAL DAILY
Qty: 30 TABLET | Refills: 0 | Status: SHIPPED | OUTPATIENT
Start: 2025-06-05

## 2025-06-09 ENCOUNTER — OFFICE VISIT (OUTPATIENT)
Dept: PAIN MEDICINE | Facility: CLINIC | Age: 70
End: 2025-06-09
Payer: MEDICARE

## 2025-06-09 VITALS — HEIGHT: 63 IN | BODY MASS INDEX: 21.62 KG/M2 | WEIGHT: 122 LBS

## 2025-06-09 DIAGNOSIS — M47.812 CERVICAL SPONDYLOSIS: ICD-10-CM

## 2025-06-09 DIAGNOSIS — G56.02 LEFT CARPAL TUNNEL SYNDROME: ICD-10-CM

## 2025-06-09 DIAGNOSIS — M48.02 CERVICAL SPINAL STENOSIS: ICD-10-CM

## 2025-06-09 DIAGNOSIS — M54.12 CERVICAL RADICULOPATHY: Primary | ICD-10-CM

## 2025-06-09 PROCEDURE — 99204 OFFICE O/P NEW MOD 45 MIN: CPT | Performed by: ANESTHESIOLOGY

## 2025-06-09 PROCEDURE — G2211 COMPLEX E/M VISIT ADD ON: HCPCS | Performed by: ANESTHESIOLOGY

## 2025-06-09 NOTE — LETTER
2025     Oscar Anna Jr., MD  [address]   [address]  [facility] [address]    Patient: Mariana Eckert   YOB: 1955   Date of Visit: 2025       Dear Dr. Oscar Anna Jr., MD:    Thank you for referring Mariana Eckert to me for evaluation. Below are my notes for this consultation.    If you have questions, please do not hesitate to call me. I look forward to following your patient along with you.         Sincerely,        Abhay Hawkins DO        CC: No Recipients    Abhay Hawkins DO  2025 12:10 PM  Sign when Signing Visit  Name: Mariana Eckert      : 1955      MRN: [id number]  Encounter Provider: Abhay Hawkins DO  Encounter Date: 2025   Encounter department: [facility] [place]  :  Assessment & Plan  Cervical radiculopathy         Cervical spondylosis         Left carpal tunnel syndrome         Cervical spinal stenosis           70-year-old female with a history of cervical spondylosis, cervical stenosis, cervical radiculopathy, last seen in 2021, referred by Dr. Anna for interval consultation regarding neck pain with numbness and paresthesias in the left upper extremity.  She also has pain radiating from the left wrist into the left proximal first digit.  EMG of bilateral upper extremities from 2024 demonstrates chronic C5-6 radiculopathy on the right and chronic C6 radiculopathy on the left.  Mild median neuropathy on the left.  MRI of the cervical spine demonstrates multilevel spondylosis with varying degrees of central and foraminal stenosis.  Moderate canal stenosis at C5-6 and C6-7.  Severe right foraminal stenosis at C3-4, otherwise mild to moderate foraminal stenosis from C3-4 through C6-7.  The patient has undergone corticosteroid injections for de Quervain's tenosynovitis by orthopedics which only provided about a week of relief.  Subsequently underwent CMC joint injection without much relief.  She typically avoids  any pain medication OTC or otherwise.  Wrist brace did not provide much relief.  The patient's left wrist and thumb pain seems to be secondary to OA and de Quervain's.  Although she does have radicular symptoms in her left upper extremity, left wrist and thumb pain was reproduced with localized to palpation.  Neurologically she was intact on exam.    1.  Recommended Voltaren gel topically to left wrist and hand 3 times daily as needed  2.  If patient's left upper extremity radicular symptoms worsen we will consider cervical epidural steroid injection  3.  Patient may take Tylenol 500 to 1000 mg every 8 hours as needed  4.  Patient may take ibuprofen 600 mg every 8 hours as needed  5.  Patient will continue with HEP  6.  I will follow-up with the patient on a as needed basis    My impressions and treatment recommendations were discussed in detail with the patient who verbalized understanding and had no further questions.  Discharge instructions were provided. I personally saw and examined the patient and I agree with the above discussed plan of care.    History of Present Illness    Mariana Eckert is a 70 y.o. female with a history of cervical spondylosis, cervical stenosis, cervical radiculopathy, last seen in October 2021, referred by Dr. Sheehan for interval consultation regarding neck pain with numbness and paresthesias in the left upper extremity.  She also has pain radiating from the left wrist into the left proximal first digit.  She does have some weakness of the left hand secondary to pain particularly with  strength.  She denies any right upper extremity symptoms.  She denies any balance issues or loss of dexterity.  EMG of bilateral upper extremities from June 5, 2024 demonstrates chronic C5-6 radiculopathy on the right and chronic C6 radiculopathy on the left.  Mild median neuropathy on the left.  MRI of the cervical spine demonstrates with a history of cervical spondylosis, cervical stenosis,  cervical radiculopathy, last seen in October 2021, referred by Dr. Sheehan for interval consultation regarding neck pain with numbness and paresthesias in the left upper extremity.  She also has pain radiating from the left wrist into the left proximal first digit.  EMG of bilateral upper extremities from June 5, 2024 demonstrates chronic C5-6 radiculopathy on the right and chronic C6 radiculopathy on the left.  Mild median neuropathy on the left.  MRI of the cervical spine demonstrates multilevel spondylosis with varying degrees of central and foraminal stenosis.  Moderate canal stenosis at C5-6 and C6-7.  Severe right foraminal stenosis at C3-4, otherwise mild to moderate foraminal stenosis from C3-4 through C6-7.  The patient has undergone corticosteroid injections for de Quervain's tenosynovitis by orthopedics which only provided about a week of relief.  Subsequently underwent CMC joint injection without much relief.  She typically avoids any pain medication OTC or otherwise.  Wrist brace did not provide much relief.  The patient has undergone corticosteroid injections for de Quervain's tenosynovitis by orthopedics which only provided about a week of relief.  Subsequently underwent CMC joint injection without much relief.  She typically avoids any pain medication OTC or otherwise.  Wrist brace did not provide much relief.  The patient rates her pain a 7 out of 10 and the pain is intermittent.  The pain is described as shooting and numbness.  The pain is increased with activities involving the left hand particularly with opening jars.  She denies any specific alleviating factors.    Other than as stated above, the patient denies any interval changes in medications, medical condition, mental condition, symptoms, or allergies since the last office visit.    Review of Systems   All other systems reviewed and are negative.    Medical History Reviewed by provider this encounter:  Tobacco  Allergies  Meds  Problems  " Med Hx  Surg Hx  Fam Hx     .  Pertinent Medical History  Cervical radiculopathy, cervical spondylosis, left CTS        Medical History Reviewed by provider this encounter:  Tobacco  Allergies  Meds  Problems  Med Hx  Surg Hx  Fam Hx     .  Past Medical History  Past Medical History[1]  Past Surgical History[2]  Family History[3]   reports that she has never smoked. She has never used smokeless tobacco. She reports that she does not currently use alcohol. She reports that she does not use drugs.  Current Outpatient Medications   Medication Instructions   • aspirin 325 mg, Daily   • estradiol (ESTRACE VAGINAL) 1 g, Vaginal, Weekly, 1 gram vaginally twice weekly   • rosuvastatin (CRESTOR) 5 mg, Oral, Daily   Allergies[4]   Medications Ordered Prior to Encounter[5]   Social History[6]     Objective  Ht 5' 3\" (1.6 m)   Wt 55.3 kg (122 lb)   BMI 21.61 kg/m²      Pain Score:   7  Physical Exam  Constitutional: normal, well developed, well nourished, alert, in no distress and non-toxic and no overt pain behavior.  Eyes: anicteric  HEENT: grossly intact  Neck: supple, symmetric, trachea midline and no masses   Pulmonary: even and unlabored  Cardiovascular: No edema or pitting edema present  Skin: Normal without rashes or lesions and well hydrated  Psychiatric: Mood and affect appropriate  Neurologic: Cranial Nerves II-XII grossly intact  Musculoskeletal: normal gait.  Bilateral cervical paraspinals and trapezii nontender to palpation, but ropey in texture.  Bilateral upper extremity strength 5 out of 5 in all muscle groups with the exception of left  strength which was 4-5.  Sensation intact to light touch in C5-T1 dermatomes bilaterally.  Negative Spurling's bilaterally.  Tender to palpation over the left thumb CMC joint.  Pain reproducible with  strength and 1st and 2nd digit pincer grasp.    Radiology Results Review: I personally reviewed the following image studies in PACS and associated radiology " reports: MRI spine. My interpretation of the radiology images/reports is: with a history of cervical spondylosis, cervical stenosis, cervical radiculopathy, last seen in 2021, referred by Dr. Sheehan for interval consultation regarding neck pain with numbness and paresthesias in the left upper extremity.  She also has pain radiating from the left wrist into the left proximal first digit..         [1]   Past Medical History:  Diagnosis Date   • Cervical ca (HCC)    • Chronic bilateral low back pain 2018   • Depression    • Depression    • Depression    • Dermatitis 2020   • Hyperlipidemia    • Hyperlipidemia    • Iron deficiency anemia    • Laceration of left thumb 10/21/2020   • Low back pain    • Lumbar radiculopathy 2019    S/p MIS Navigated L3-4 laminectomy/decompression (right approach) with transforaminal lumbar interbody fusion Dr. Zhang 19     • Meralgia paresthetica    • Sciatica     right side   • Spinal stenosis of lumbar region with neurogenic claudication 2019   • Spondylolisthesis of lumbar region 2019   • Stroke ([facility])     embolic   • Synovial cyst of lumbar facet joint 2019   • Trigger finger of left thumb 2020    Injected May 27, 2020, 2020.    [2]   Past Surgical History:  Procedure Laterality Date   •  SECTION      x2   ,    • COLONOSCOPY     • FL INJECTION LEFT HIP (ARTHROGRAM)  2023   • HYSTEROSCOPY     • MT ARTHRODESIS COMBINED TQ 1NTRSPC LUMBAR N/A 2019    Procedure: MIS Navigated L3-4 laminectomy/decompression (right approach) with transforaminal lumbar interbody fusion;  Surgeon: Enrique Zhang MD;  [facility];  Service: Neurosurgery   • MT COLONOSCOPY FLX DX W/COLLJ SPEC WHEN PFRMD N/A 2017    Procedure: COLONOSCOPY;  Surgeon: Deidra Aj DO;  Location: [facility] GI LAB;  Service: Gastroenterology   • MT TENDON SHEATH INCISION Left 2021    Procedure: Left thumb trigger  finger release;  Surgeon: Camilo Bucio MD;  Location: [facility];  Service: Orthopedics   • TOE SURGERY Right 07/2021    big toe stiched and broken   • TRIGGER FINGER RELEASE Left 09/2021   • TUBAL LIGATION     [3]   Family History  Problem Relation Name Age of Onset   • Hyperlipidemia Mother     • Heart disease Mother  61        alive in 80s   • Stroke Mother     • Depression Sister     • Hypertension Sister     • Cancer Maternal Grandfather     • Hypertension Family     • Stroke Family     [4]   Allergies  Allergen Reactions   • Paxil [Paroxetine] Rash   [5]   Current Outpatient Medications on File Prior to Visit   Medication Sig Dispense Refill   • aspirin 325 mg tablet Take 325 mg by mouth in the morning.     • estradiol (ESTRACE VAGINAL) 0.1 mg/g vaginal cream Insert 1 g into the vagina once a week 1 gram vaginally twice weekly 85 g 2   • rosuvastatin (CRESTOR) 5 mg tablet Take 1 tablet (5 mg total) by mouth daily 30 tablet 0     No current facility-administered medications on file prior to visit.   [6]   Social History  Tobacco Use   • Smoking status: Never   • Smokeless tobacco: Never   Vaping Use   • Vaping status: Never Used   Substance and Sexual Activity   • Alcohol use: Not Currently   • Drug use: Never   • Sexual activity: Yes     Birth control/protection: Post-menopausal

## 2025-06-09 NOTE — PROGRESS NOTES
Name: Mariana Eckert      : 1955      MRN: [id number]  Encounter Provider: Abhay Hawkins DO  Encounter Date: 2025   Encounter [facility]  :  Assessment & Plan  Cervical radiculopathy         Cervical spondylosis         Left carpal tunnel syndrome         Cervical spinal stenosis           70-year-old female with a history of cervical spondylosis, cervical stenosis, cervical radiculopathy, last seen in 2021, referred by Dr. Sheehan for interval consultation regarding neck pain with numbness and paresthesias in the left upper extremity.  She also has pain radiating from the left wrist into the left proximal first digit.  EMG of bilateral upper extremities from 2024 demonstrates chronic C5-6 radiculopathy on the right and chronic C6 radiculopathy on the left.  Mild median neuropathy on the left.  MRI of the cervical spine demonstrates multilevel spondylosis with varying degrees of central and foraminal stenosis.  Moderate canal stenosis at C5-6 and C6-7.  Severe right foraminal stenosis at C3-4, otherwise mild to moderate foraminal stenosis from C3-4 through C6-7.  The patient has undergone corticosteroid injections for de Quervain's tenosynovitis by orthopedics which only provided about a week of relief.  Subsequently underwent CMC joint injection without much relief.  She typically avoids any pain medication OTC or otherwise.  Wrist brace did not provide much relief.  The patient's left wrist and thumb pain seems to be secondary to OA and de Quervain's.  Although she does have radicular symptoms in her left upper extremity, left wrist and thumb pain was reproduced with localized to palpation.  Neurologically she was intact on exam.    1.  Recommended Voltaren gel topically to left wrist and hand 3 times daily as needed  2.  If patient's left upper extremity radicular symptoms worsen we will consider cervical epidural steroid injection  3.  Patient may  take Tylenol 500 to 1000 mg every 8 hours as needed  4.  Patient may take ibuprofen 600 mg every 8 hours as needed  5.  Patient will continue with HEP  6.  I will follow-up with the patient on a as needed basis    My impressions and treatment recommendations were discussed in detail with the patient who verbalized understanding and had no further questions.  Discharge instructions were provided. I personally saw and examined the patient and I agree with the above discussed plan of care.    History of Present Illness     Mariana Eckert is a 70 y.o. female with a history of cervical spondylosis, cervical stenosis, cervical radiculopathy, last seen in October 2021, referred by Dr. Sheehan for interval consultation regarding neck pain with numbness and paresthesias in the left upper extremity.  She also has pain radiating from the left wrist into the left proximal first digit.  She does have some weakness of the left hand secondary to pain particularly with  strength.  She denies any right upper extremity symptoms.  She denies any balance issues or loss of dexterity.  EMG of bilateral upper extremities from June 5, 2024 demonstrates chronic C5-6 radiculopathy on the right and chronic C6 radiculopathy on the left.  Mild median neuropathy on the left.  MRI of the cervical spine demonstrates with a history of cervical spondylosis, cervical stenosis, cervical radiculopathy, last seen in October 2021, referred by Dr. Sheehan for interval consultation regarding neck pain with numbness and paresthesias in the left upper extremity.  She also has pain radiating from the left wrist into the left proximal first digit.  EMG of bilateral upper extremities from June 5, 2024 demonstrates chronic C5-6 radiculopathy on the right and chronic C6 radiculopathy on the left.  Mild median neuropathy on the left.  MRI of the cervical spine demonstrates multilevel spondylosis with varying degrees of central and foraminal stenosis.   Moderate canal stenosis at C5-6 and C6-7.  Severe right foraminal stenosis at C3-4, otherwise mild to moderate foraminal stenosis from C3-4 through C6-7.  The patient has undergone corticosteroid injections for de Quervain's tenosynovitis by orthopedics which only provided about a week of relief.  Subsequently underwent CMC joint injection without much relief.  She typically avoids any pain medication OTC or otherwise.  Wrist brace did not provide much relief.  The patient has undergone corticosteroid injections for de Quervain's tenosynovitis by orthopedics which only provided about a week of relief.  Subsequently underwent CMC joint injection without much relief.  She typically avoids any pain medication OTC or otherwise.  Wrist brace did not provide much relief.  The patient rates her pain a 7 out of 10 and the pain is intermittent.  The pain is described as shooting and numbness.  The pain is increased with activities involving the left hand particularly with opening jars.  She denies any specific alleviating factors.    Other than as stated above, the patient denies any interval changes in medications, medical condition, mental condition, symptoms, or allergies since the last office visit.    Review of Systems   All other systems reviewed and are negative.    Medical History Reviewed by provider this encounter:  Tobacco  Allergies  Meds  Problems  Med Hx  Surg Hx  Fam Hx     .  Pertinent Medical History   Cervical radiculopathy, cervical spondylosis, left CTS        Medical History Reviewed by provider this encounter:  Tobacco  Allergies  Meds  Problems  Med Hx  Surg Hx  Fam Hx     .  Past Medical History   Past Medical History[1]  Past Surgical History[2]  Family History[3]   reports that she has never smoked. She has never used smokeless tobacco. She reports that she does not currently use alcohol. She reports that she does not use drugs.  Current Outpatient Medications   Medication Instructions  "   aspirin 325 mg, Daily    estradiol (ESTRACE VAGINAL) 1 g, Vaginal, Weekly, 1 gram vaginally twice weekly    rosuvastatin (CRESTOR) 5 mg, Oral, Daily   Allergies[4]   Medications Ordered Prior to Encounter[5]   Social History[6]     Objective   Ht 5' 3\" (1.6 m)   Wt 55.3 kg (122 lb)   BMI 21.61 kg/m²      Pain Score:   7  Physical Exam  Constitutional: normal, well developed, well nourished, alert, in no distress and non-toxic and no overt pain behavior.  Eyes: anicteric  HEENT: grossly intact  Neck: supple, symmetric, trachea midline and no masses   Pulmonary: even and unlabored  Cardiovascular: No edema or pitting edema present  Skin: Normal without rashes or lesions and well hydrated  Psychiatric: Mood and affect appropriate  Neurologic: Cranial Nerves II-XII grossly intact  Musculoskeletal: normal gait.  Bilateral cervical paraspinals and trapezii nontender to palpation, but ropey in texture.  Bilateral upper extremity strength 5 out of 5 in all muscle groups with the exception of left  strength which was 4-5.  Sensation intact to light touch in C5-T1 dermatomes bilaterally.  Negative Spurling's bilaterally.  Tender to palpation over the left thumb CMC joint.  Pain reproducible with  strength and 1st and 2nd digit pincer grasp.    Radiology Results Review: I personally reviewed the following image studies in PACS and associated radiology reports: MRI spine. My interpretation of the radiology images/reports is: with a history of cervical spondylosis, cervical stenosis, cervical radiculopathy, last seen in October 2021, referred by Dr. Sheehan for interval consultation regarding neck pain with numbness and paresthesias in the left upper extremity.  She also has pain radiating from the left wrist into the left proximal first digit..         [1]   Past Medical History:  Diagnosis Date    Cervical ca (HCC) 1979    Chronic bilateral low back pain 11/23/2018    Depression     Depression     Depression     " Dermatitis 2020    Hyperlipidemia     Hyperlipidemia     Iron deficiency anemia     Laceration of left thumb 10/21/2020    Low back pain     Lumbar radiculopathy 2019    S/p MIS Navigated L3-4 laminectomy/decompression (right approach) with transforaminal lumbar interbody fusion Dr. Zhang 19      Meralgia paresthetica     Sciatica     right side    Spinal stenosis of lumbar region with neurogenic claudication 2019    Spondylolisthesis of lumbar region 2019    Stroke (HCC)     embolic    Synovial cyst of lumbar facet joint 2019    Trigger finger of left thumb 2020    Injected May 27, 2020, 2020.    [2]   Past Surgical History:  Procedure Laterality Date     SECTION      x2   ,     COLONOSCOPY      FL INJECTION LEFT HIP (ARTHROGRAM)  2023    HYSTEROSCOPY      ME ARTHRODESIS COMBINED TQ 1NTRSPC LUMBAR N/A 2019    Procedure: MIS Navigated L3-4 laminectomy/decompression (right approach) with transforaminal lumbar interbody fusion;  Surgeon: Enrique Zhang MD;  Location: [facility];  Service: Neurosurgery    ME COLONOSCOPY FLX DX W/COLLJ SPEC WHEN PFRMD N/A 2017    Procedure: COLONOSCOPY;  Surgeon: Deidra Aj DO;  Location: [facility];  Service: Gastroenterology    ME TENDON SHEATH INCISION Left 2021    Procedure: Left thumb trigger finger release;  Surgeon: Camilo Bucio MD;  Location: [facility];  Service: Orthopedics    TOE SURGERY Right 2021    big toe stiched and broken    TRIGGER FINGER RELEASE Left 2021    TUBAL LIGATION     [3]   Family History  Problem Relation Name Age of Onset    Hyperlipidemia Mother      Heart disease Mother  61        alive in 80s    Stroke Mother      Depression Sister      Hypertension Sister      Cancer Maternal Grandfather      Hypertension Family      Stroke Family     [4]   Allergies  Allergen Reactions    Paxil [Paroxetine] Rash   [5]   Current Outpatient Medications on  File Prior to Visit   Medication Sig Dispense Refill    aspirin 325 mg tablet Take 325 mg by mouth in the morning.      estradiol (ESTRACE VAGINAL) 0.1 mg/g vaginal cream Insert 1 g into the vagina once a week 1 gram vaginally twice weekly 85 g 2    rosuvastatin (CRESTOR) 5 mg tablet Take 1 tablet (5 mg total) by mouth daily 30 tablet 0     No current facility-administered medications on file prior to visit.   [6]   Social History  Tobacco Use    Smoking status: Never    Smokeless tobacco: Never   Vaping Use    Vaping status: Never Used   Substance and Sexual Activity    Alcohol use: Not Currently    Drug use: Never    Sexual activity: Yes     Birth control/protection: Post-menopausal

## 2025-06-10 ENCOUNTER — APPOINTMENT (OUTPATIENT)
Dept: LAB | Facility: MEDICAL CENTER | Age: 70
End: 2025-06-10
Attending: FAMILY MEDICINE
Payer: MEDICARE

## 2025-06-10 DIAGNOSIS — N18.31 STAGE 3A CHRONIC KIDNEY DISEASE (HCC): ICD-10-CM

## 2025-06-10 LAB
ALBUMIN SERPL BCG-MCNC: 4.5 G/DL (ref 3.5–5)
ALP SERPL-CCNC: 87 U/L (ref 34–104)
ALT SERPL W P-5'-P-CCNC: 16 U/L (ref 7–52)
ANION GAP SERPL CALCULATED.3IONS-SCNC: 5 MMOL/L (ref 4–13)
AST SERPL W P-5'-P-CCNC: 19 U/L (ref 13–39)
BASOPHILS # BLD AUTO: 0.09 THOUSANDS/ÂΜL (ref 0–0.1)
BASOPHILS NFR BLD AUTO: 2 % (ref 0–1)
BILIRUB SERPL-MCNC: 0.65 MG/DL (ref 0.2–1)
BUN SERPL-MCNC: 16 MG/DL (ref 5–25)
CALCIUM SERPL-MCNC: 9.5 MG/DL (ref 8.4–10.2)
CHLORIDE SERPL-SCNC: 103 MMOL/L (ref 96–108)
CHOLEST SERPL-MCNC: 232 MG/DL (ref ?–200)
CO2 SERPL-SCNC: 31 MMOL/L (ref 21–32)
CREAT SERPL-MCNC: 0.88 MG/DL (ref 0.6–1.3)
EOSINOPHIL # BLD AUTO: 0.3 THOUSAND/ÂΜL (ref 0–0.61)
EOSINOPHIL NFR BLD AUTO: 5 % (ref 0–6)
ERYTHROCYTE [DISTWIDTH] IN BLOOD BY AUTOMATED COUNT: 13.6 % (ref 11.6–15.1)
GFR SERPL CREATININE-BSD FRML MDRD: 67 ML/MIN/1.73SQ M
GLUCOSE P FAST SERPL-MCNC: 85 MG/DL (ref 65–99)
HCT VFR BLD AUTO: 43.7 % (ref 34.8–46.1)
HDLC SERPL-MCNC: 75 MG/DL
HGB BLD-MCNC: 14.3 G/DL (ref 11.5–15.4)
IMM GRANULOCYTES # BLD AUTO: 0.02 THOUSAND/UL (ref 0–0.2)
IMM GRANULOCYTES NFR BLD AUTO: 0 % (ref 0–2)
LDLC SERPL CALC-MCNC: 130 MG/DL (ref 0–100)
LYMPHOCYTES # BLD AUTO: 1.93 THOUSANDS/ÂΜL (ref 0.6–4.47)
LYMPHOCYTES NFR BLD AUTO: 34 % (ref 14–44)
MCH RBC QN AUTO: 30 PG (ref 26.8–34.3)
MCHC RBC AUTO-ENTMCNC: 32.7 G/DL (ref 31.4–37.4)
MCV RBC AUTO: 92 FL (ref 82–98)
MONOCYTES # BLD AUTO: 0.69 THOUSAND/ÂΜL (ref 0.17–1.22)
MONOCYTES NFR BLD AUTO: 12 % (ref 4–12)
NEUTROPHILS # BLD AUTO: 2.7 THOUSANDS/ÂΜL (ref 1.85–7.62)
NEUTS SEG NFR BLD AUTO: 47 % (ref 43–75)
NRBC BLD AUTO-RTO: 0 /100 WBCS
PLATELET # BLD AUTO: 288 THOUSANDS/UL (ref 149–390)
PMV BLD AUTO: 10.4 FL (ref 8.9–12.7)
POTASSIUM SERPL-SCNC: 5 MMOL/L (ref 3.5–5.3)
PROT SERPL-MCNC: 7.6 G/DL (ref 6.4–8.4)
RBC # BLD AUTO: 4.76 MILLION/UL (ref 3.81–5.12)
SODIUM SERPL-SCNC: 139 MMOL/L (ref 135–147)
TRIGL SERPL-MCNC: 136 MG/DL (ref ?–150)
WBC # BLD AUTO: 5.73 THOUSAND/UL (ref 4.31–10.16)

## 2025-06-10 PROCEDURE — 80053 COMPREHEN METABOLIC PANEL: CPT

## 2025-06-10 PROCEDURE — 80061 LIPID PANEL: CPT

## 2025-06-10 PROCEDURE — 85025 COMPLETE CBC W/AUTO DIFF WBC: CPT

## 2025-06-10 PROCEDURE — 36415 COLL VENOUS BLD VENIPUNCTURE: CPT

## 2025-06-12 ENCOUNTER — RESULTS FOLLOW-UP (OUTPATIENT)
Dept: FAMILY MEDICINE CLINIC | Facility: CLINIC | Age: 70
End: 2025-06-12

## 2025-07-16 ENCOUNTER — OFFICE VISIT (OUTPATIENT)
Dept: FAMILY MEDICINE CLINIC | Facility: CLINIC | Age: 70
End: 2025-07-16
Payer: MEDICARE

## 2025-07-16 VITALS
HEIGHT: 63 IN | BODY MASS INDEX: 22.36 KG/M2 | HEART RATE: 50 BPM | DIASTOLIC BLOOD PRESSURE: 70 MMHG | WEIGHT: 126.2 LBS | SYSTOLIC BLOOD PRESSURE: 116 MMHG | OXYGEN SATURATION: 97 %

## 2025-07-16 DIAGNOSIS — Z86.73 HISTORY OF CVA (CEREBROVASCULAR ACCIDENT): ICD-10-CM

## 2025-07-16 DIAGNOSIS — N95.2 ATROPHIC VAGINITIS: ICD-10-CM

## 2025-07-16 DIAGNOSIS — M18.12 PRIMARY OSTEOARTHRITIS OF FIRST CARPOMETACARPAL JOINT OF LEFT HAND: ICD-10-CM

## 2025-07-16 DIAGNOSIS — M47.812 CERVICAL SPONDYLOSIS: ICD-10-CM

## 2025-07-16 DIAGNOSIS — M25.511 CHRONIC PAIN OF BOTH SHOULDERS: ICD-10-CM

## 2025-07-16 DIAGNOSIS — Z00.00 MEDICARE ANNUAL WELLNESS VISIT, SUBSEQUENT: Primary | ICD-10-CM

## 2025-07-16 DIAGNOSIS — N18.2 STAGE 2 CHRONIC KIDNEY DISEASE: ICD-10-CM

## 2025-07-16 DIAGNOSIS — E78.00 PURE HYPERCHOLESTEROLEMIA: ICD-10-CM

## 2025-07-16 DIAGNOSIS — M65.4 TENOSYNOVITIS, DE QUERVAIN: ICD-10-CM

## 2025-07-16 DIAGNOSIS — Z78.0 POST-MENOPAUSAL: ICD-10-CM

## 2025-07-16 DIAGNOSIS — M25.552 LEFT HIP PAIN: ICD-10-CM

## 2025-07-16 DIAGNOSIS — M54.12 CERVICAL RADICULOPATHY: ICD-10-CM

## 2025-07-16 DIAGNOSIS — Z12.31 ENCOUNTER FOR SCREENING MAMMOGRAM FOR BREAST CANCER: ICD-10-CM

## 2025-07-16 DIAGNOSIS — M25.512 CHRONIC PAIN OF BOTH SHOULDERS: ICD-10-CM

## 2025-07-16 DIAGNOSIS — G89.29 CHRONIC PAIN OF BOTH SHOULDERS: ICD-10-CM

## 2025-07-16 DIAGNOSIS — R93.1 ELEVATED CORONARY ARTERY CALCIUM SCORE: ICD-10-CM

## 2025-07-16 DIAGNOSIS — Z23 ENCOUNTER FOR IMMUNIZATION: ICD-10-CM

## 2025-07-16 PROCEDURE — G0009 ADMIN PNEUMOCOCCAL VACCINE: HCPCS | Performed by: FAMILY MEDICINE

## 2025-07-16 PROCEDURE — 90677 PCV20 VACCINE IM: CPT | Performed by: FAMILY MEDICINE

## 2025-07-16 PROCEDURE — 99214 OFFICE O/P EST MOD 30 MIN: CPT | Performed by: FAMILY MEDICINE

## 2025-07-16 PROCEDURE — G2211 COMPLEX E/M VISIT ADD ON: HCPCS | Performed by: FAMILY MEDICINE

## 2025-07-16 PROCEDURE — G0439 PPPS, SUBSEQ VISIT: HCPCS | Performed by: FAMILY MEDICINE

## 2025-07-16 RX ORDER — ESTRADIOL 0.1 MG/G
1 CREAM VAGINAL WEEKLY
Qty: 85 G | Refills: 2 | Status: SHIPPED | OUTPATIENT
Start: 2025-07-16

## 2025-07-16 RX ORDER — CELECOXIB 200 MG/1
200 CAPSULE ORAL DAILY PRN
Qty: 30 CAPSULE | Refills: 5 | Status: SHIPPED | OUTPATIENT
Start: 2025-07-16

## 2025-07-16 RX ORDER — ROSUVASTATIN CALCIUM 5 MG/1
5 TABLET, COATED ORAL DAILY
Qty: 100 TABLET | Refills: 3 | Status: SHIPPED | OUTPATIENT
Start: 2025-07-16

## 2025-07-16 NOTE — ASSESSMENT & PLAN NOTE
Lab Results   Component Value Date    EGFR 67 06/10/2025    EGFR 67 10/24/2023    EGFR 57 06/06/2023    CREATININE 0.88 06/10/2025    CREATININE 0.89 10/24/2023    CREATININE 1.02 06/06/2023   Monitor renal function.    Orders:    CBC and differential; Future    Comprehensive metabolic panel; Future

## 2025-07-16 NOTE — ASSESSMENT & PLAN NOTE
She is prescribed Celebrex to use as needed.  Orders:    celecoxib (CeleBREX) 200 mg capsule; Take 1 capsule (200 mg total) by mouth daily as needed for mild pain

## 2025-07-16 NOTE — ASSESSMENT & PLAN NOTE
Continue statin therapy.  Check labs prior to follow-up.  Orders:    rosuvastatin (CRESTOR) 5 mg tablet; Take 1 tablet (5 mg total) by mouth daily    Lipid Panel with Direct LDL reflex; Future    CBC and differential; Future    Comprehensive metabolic panel; Future

## 2025-07-16 NOTE — ASSESSMENT & PLAN NOTE
Injection was not helpful with orthopedics recently.  Try Celebrex as needed.  Orders:    celecoxib (CeleBREX) 200 mg capsule; Take 1 capsule (200 mg total) by mouth daily as needed for mild pain

## 2025-07-16 NOTE — ASSESSMENT & PLAN NOTE
She is having some persistent symptoms of cervical radiculopathy.  I gave her Celebrex to use just as needed.  She like to hold off on any intervention such as epidurals etc.  Orders:    celecoxib (CeleBREX) 200 mg capsule; Take 1 capsule (200 mg total) by mouth daily as needed for mild pain

## 2025-07-16 NOTE — ASSESSMENT & PLAN NOTE
Orders:    celecoxib (CeleBREX) 200 mg capsule; Take 1 capsule (200 mg total) by mouth daily as needed for mild pain

## 2025-07-16 NOTE — PROGRESS NOTES
Name: Mariana Eckert      : 1955      MRN: [id number]  Encounter Provider: Oscar Sheehan Jr, MD  Encounter Date: 2025   Encounter department: [facility] PRIMARY CARE  :  Assessment & Plan  Medicare annual wellness visit, subsequent  She is due for Prevnar 20 which will be given today.  She is up-to-date on breast cancer and colon cancer screening.       Pure hypercholesterolemia  Continue statin therapy.  Check labs prior to follow-up.  Orders:    rosuvastatin (CRESTOR) 5 mg tablet; Take 1 tablet (5 mg total) by mouth daily    Lipid Panel with Direct LDL reflex; Future    CBC and differential; Future    Comprehensive metabolic panel; Future    Encounter for screening mammogram for breast cancer    Orders:    Mammo screening bilateral w 3d and cad; Future    Cervical radiculopathy  She is having some persistent symptoms of cervical radiculopathy.  I gave her Celebrex to use just as needed.  She like to hold off on any intervention such as epidurals etc.  Orders:    celecoxib (CeleBREX) 200 mg capsule; Take 1 capsule (200 mg total) by mouth daily as needed for mild pain    Stage 2 chronic kidney disease  Lab Results   Component Value Date    EGFR 67 06/10/2025    EGFR 67 10/24/2023    EGFR 57 2023    CREATININE 0.88 06/10/2025    CREATININE 0.89 10/24/2023    CREATININE 1.02 2023   Monitor renal function.    Orders:    CBC and differential; Future    Comprehensive metabolic panel; Future    Elevated coronary artery calcium score  Continue risk factor modification.  Orders:    Lipid Panel with Direct LDL reflex; Future    CBC and differential; Future    Comprehensive metabolic panel; Future    Encounter for immunization    Orders:    Pneumococcal Conjugate Vaccine 20-valent (Pcv20)    Primary osteoarthritis of first carpometacarpal joint of left hand  She has seen orthopedics.  Unfortunately, she did not do well after an injection for de Quervain's tenosynovitis.  I did  prescribe Celebrex for as needed use.       Cervical spondylosis    Orders:    celecoxib (CeleBREX) 200 mg capsule; Take 1 capsule (200 mg total) by mouth daily as needed for mild pain    Left hip pain  She has some pain in her left hip consistent with some hip flexor tendinitis.  Try Celebrex as needed.  Orders:    celecoxib (CeleBREX) 200 mg capsule; Take 1 capsule (200 mg total) by mouth daily as needed for mild pain    Tenosynovitis, de Quervain  Injection was not helpful with orthopedics recently.  Try Celebrex as needed.  Orders:    celecoxib (CeleBREX) 200 mg capsule; Take 1 capsule (200 mg total) by mouth daily as needed for mild pain    Chronic pain of both shoulders  She is prescribed Celebrex to use as needed.  Orders:    celecoxib (CeleBREX) 200 mg capsule; Take 1 capsule (200 mg total) by mouth daily as needed for mild pain    Post-menopausal  Check DEXA  Orders:    DXA bone density spine hip and pelvis; Future    History of CVA (cerebrovascular accident)  Continue risk factor modification.          Preventive health issues were discussed with patient, and age appropriate screening tests were ordered as noted in patient's After Visit Summary. Personalized health advice and appropriate referrals for health education or preventive services given if needed, as noted in patient's After Visit Summary.    History of Present Illness     HPI patient presents here for Medicare wellness as well as a follow-up of chronic health issues.  Overall, she doing pretty well.  She does have a lot of aches and pains including bilateral shoulder pain and some left hip pain as well as some chronic neck stiffness with radicular symptoms into her left hand.  She has seen orthopedics as well as pain management.  She would like to hold off on any further procedures.  She is tolerating statin therapy.  She denies any chest pain, shortness of breath or palpitations.  She does remain on aspirin for history of CVA.  Patient Care  Team:  Oscar Anna Jr., MD as PCP - General  Deidra Aj DO as Endoscopist    Review of Systems   Constitutional:  Negative for appetite change, chills, fatigue, fever and unexpected weight change.   HENT:  Negative for trouble swallowing.    Eyes:  Negative for visual disturbance.   Respiratory:  Negative for cough, chest tightness, shortness of breath and wheezing.    Cardiovascular:  Negative for chest pain, palpitations and leg swelling.   Gastrointestinal:  Negative for abdominal distention, abdominal pain, blood in stool, constipation and diarrhea.   Endocrine: Negative for polyuria.   Genitourinary:  Negative for difficulty urinating and flank pain.   Musculoskeletal:  Positive for arthralgias, back pain and neck pain. Negative for gait problem, joint swelling and myalgias.   Skin:  Negative for rash.   Neurological:  Negative for dizziness and light-headedness.   Hematological:  Negative for adenopathy. Does not bruise/bleed easily.   Psychiatric/Behavioral:  Negative for dysphoric mood and sleep disturbance. The patient is not nervous/anxious.      Medical History Reviewed by provider this encounter:       Annual Wellness Visit Questionnaire   Mariana is here for her Subsequent Wellness visit.     Health Risk Assessment:   Patient rates overall health as good. Patient feels that their physical health rating is same. Patient is very satisfied with their life. Eyesight was rated as same. Hearing was rated as slightly worse. Patient feels that their emotional and mental health rating is same. Patients states they are never, rarely angry. Patient states they are sometimes unusually tired/fatigued. Pain experienced in the last 7 days has been some. Patient's pain rating has been 8/10. Patient states that she has experienced no weight loss or gain in last 6 months. B/l shoulder pain and thumb pain    Depression Screening:   PHQ-2 Score: 0      Fall Risk Screening:   In the past year, patient has  experienced: no history of falling in past year      Urinary Incontinence Screening:   Patient has not leaked urine accidently in the last six months.     Home Safety:  Patient does not have trouble with stairs inside or outside of their home. Patient has working smoke alarms and has working carbon monoxide detector. Home safety hazards include: none.     Nutrition:   Current diet is Regular and Limited junk food.     Medications:   Patient is not currently taking any over-the-counter supplements. Patient is able to manage medications.     Activities of Daily Living (ADLs)/Instrumental Activities of Daily Living (IADLs):   Walk and transfer into and out of bed and chair?: Yes  Dress and groom yourself?: Yes    Bathe or shower yourself?: Yes    Feed yourself? Yes  Do your laundry/housekeeping?: Yes  Manage your money, pay your bills and track your expenses?: Yes  Make your own meals?: Yes    Do your own shopping?: Yes    Previous Hospitalizations:   Any hospitalizations or ED visits within the last 12 months?: No      Advance Care Planning:   Living will: Yes    Durable POA for healthcare: Yes    Advanced directive: Yes    End of Life Decisions reviewed with patient: Yes      Cognitive Screening:   Provider or family/friend/caregiver concerned regarding cognition?: No    Preventive Screenings      Cardiovascular Screening:    General: Screening Not Indicated and History Lipid Disorder      Diabetes Screening:     General: Screening Current      Colorectal Cancer Screening:     General: Screening Current      Breast Cancer Screening:     General: Screening Current      Cervical Cancer Screening:    General: History Cervical Cancer      Osteoporosis Screening:    General: Screening Current      Abdominal Aortic Aneurysm (AAA) Screening:        General: Screening Not Indicated      Lung Cancer Screening:     General: Screening Not Indicated      Hepatitis C Screening:    General: Screening Current    Immunizations:  -  Immunizations due: Prevnar 20 and Zoster (Shingrix)    Cardiovascular Risk Assessment:  Patient does not have underlying ASCVD and their cardiovascular risk was assessed today. Their cardiovascular risk factors include: hyperlipidemia and CKD/proteinuria.     The ASCVD Risk score (Vicki MCNULTY, et al., 2019) failed to calculate for the following reasons:    Risk score cannot be calculated because patient has a medical history suggesting prior/existing ASCVD    Screening, Brief Intervention, and Referral to Treatment (SBIRT)     Screening  Typical number of drinks in a day: 0  Typical number of drinks in a week: 0  Interpretation: Low risk drinking behavior.    AUDIT-C Screenin) How often did you have a drink containing alcohol in the past year? monthly or less  2) How many drinks did you have on a typical day when you were drinking in the past year? 1 to 2  3) How often did you have 6 or more drinks on one occasion in the past year? never    AUDIT-C Score: 1  Interpretation: Score 0-2 (female): Negative screen for alcohol misuse    Single Item Drug Screening:  How often have you used an illegal drug (including marijuana) or a prescription medication for non-medical reasons in the past year? never    Single Item Drug Screen Score: 0  Interpretation: Negative screen for possible drug use disorder    Annual Depression Screening  Time spent screening and evaluating the patient for depression during today's encounter was 5 minutes.    SDOH Risk Assessment  Social determinants of health (SDOH) risk assesment tool was completed. The tool at a minimum covered housing stability, food insecurity, transportation needs, and utility difficulty. Patient had at risk responses for the following SDOH domains: housing stability.     Social Drivers of Health     Financial Resource Strain: Low Risk  (2023)    Overall Financial Resource Strain (CARDIA)     Difficulty of Paying Living Expenses: Not hard at all   Food Insecurity: No  Food Insecurity (7/11/2025)    Nursing - Inadequate Food Risk Classification     Worried About Running Out of Food in the Last Year: Never true     Ran Out of Food in the Last Year: Never true   Transportation Needs: No Transportation Needs (7/11/2025)    PRAPARE - Transportation     Lack of Transportation (Medical): No     Lack of Transportation (Non-Medical): No   Housing Stability: High Risk (7/11/2025)    Housing Stability Vital Sign     Unable to Pay for Housing in the Last Year: No     Number of Times Moved in the Last Year: 2     Homeless in the Last Year: No   Utilities: Not At Risk (7/11/2025)    [facility] Utilities     Threatened with loss of utilities: No     No results found.    Objective   There were no vitals taken for this visit.    Physical Exam  Constitutional:       General: She is not in acute distress.     Appearance: She is well-developed. She is not diaphoretic.   HENT:      Head: Normocephalic.      Right Ear: External ear normal.      Left Ear: External ear normal.      Nose: Nose normal.     Eyes:      General: No scleral icterus.        Right eye: No discharge.         Left eye: No discharge.      Conjunctiva/sclera: Conjunctivae normal.      Pupils: Pupils are equal, round, and reactive to light.     Neck:      Thyroid: No thyromegaly.      Trachea: No tracheal deviation.     Cardiovascular:      Rate and Rhythm: Normal rate and regular rhythm.      Heart sounds: Normal heart sounds. No murmur heard.  Pulmonary:      Effort: Pulmonary effort is normal. No respiratory distress.      Breath sounds: Normal breath sounds. No stridor. No wheezing, rhonchi or rales.   Abdominal:      General: Bowel sounds are normal. There is no distension.      Palpations: Abdomen is soft.      Tenderness: There is no abdominal tenderness. There is no guarding.     Musculoskeletal:         General: No tenderness or deformity. Normal range of motion.      Right lower leg: No edema.      Left lower leg: No edema.    Lymphadenopathy:      Cervical: No cervical adenopathy.     Skin:     General: Skin is warm.      Coloration: Skin is not pale.      Findings: No erythema or rash.     Neurological:      Cranial Nerves: No cranial nerve deficit.      Coordination: Coordination normal.     Psychiatric:         Mood and Affect: Mood normal.         Behavior: Behavior normal.         Thought Content: Thought content normal.

## 2025-07-16 NOTE — ASSESSMENT & PLAN NOTE
Continue risk factor modification.  Orders:    Lipid Panel with Direct LDL reflex; Future    CBC and differential; Future    Comprehensive metabolic panel; Future

## 2025-07-16 NOTE — ASSESSMENT & PLAN NOTE
She has seen orthopedics.  Unfortunately, she did not do well after an injection for de Quervain's tenosynovitis.  I did prescribe Celebrex for as needed use.

## 2025-07-16 NOTE — ASSESSMENT & PLAN NOTE
She has some pain in her left hip consistent with some hip flexor tendinitis.  Try Celebrex as needed.  Orders:    celecoxib (CeleBREX) 200 mg capsule; Take 1 capsule (200 mg total) by mouth daily as needed for mild pain

## (undated) DEVICE — MONITORING SPINAL IMPULSE CASE FEE

## (undated) DEVICE — FLOSEAL HEMOSTATIC MATRIX, 5 ML: Brand: FLOSEAL

## (undated) DEVICE — BETHLEHEM UNIVERSAL SPINE, KIT: Brand: CARDINAL HEALTH

## (undated) DEVICE — DRAPE SHEET THREE QUARTER

## (undated) DEVICE — TRAY FOLEY 16FR URIMETER SURESTEP

## (undated) DEVICE — SPONGE PVP SCRUB WING STERILE

## (undated) DEVICE — LIGHT HANDLE COVER SLEEVE DISP BLUE STELLAR

## (undated) DEVICE — INTENDED FOR TISSUE SEPARATION, AND OTHER PROCEDURES THAT REQUIRE A SHARP SURGICAL BLADE TO PUNCTURE OR CUT.: Brand: BARD-PARKER SAFETY BLADES SIZE 10, STERILE

## (undated) DEVICE — SUPPLY FEE STD

## (undated) DEVICE — CUFF TOURNIQUET 18 X 4 IN QUICK CONNECT DISP 1 BLADDER

## (undated) DEVICE — PROXIMATE PLUS MD MULTI-DIRECTIONAL RELEASE SKIN STAPLERS CONTAINS 35 STAINLESS STEEL STAPLES APPROXIMATE CLOSED DIMENSIONS: 6.9MM X 3.9MM WIDE: Brand: PROXIMATE

## (undated) DEVICE — PENCIL ELECTROSURG E-Z CLEAN -0035H

## (undated) DEVICE — GAUZE SPONGES,16 PLY: Brand: CURITY

## (undated) DEVICE — INTENDED FOR TISSUE SEPARATION, AND OTHER PROCEDURES THAT REQUIRE A SHARP SURGICAL BLADE TO PUNCTURE OR CUT.: Brand: BARD-PARKER ® CARBON RIB-BACK BLADES

## (undated) DEVICE — GLOVE SRG BIOGEL 7.5

## (undated) DEVICE — 3M™ TEGADERM™ TRANSPARENT FILM DRESSING FRAME STYLE, 1628, 6 IN X 8 IN (15 CM X 20 CM), 10/CT 8CT/CASE: Brand: 3M™ TEGADERM™

## (undated) DEVICE — DISPOSABLE EQUIPMENT COVER: Brand: SMALL TOWEL DRAPE

## (undated) DEVICE — TOOL 14MH30 LEGEND 14CM 3MM: Brand: MIDAS REX ™

## (undated) DEVICE — STERILE BETHLEHEM PLASTIC HAND: Brand: CARDINAL HEALTH

## (undated) DEVICE — NEURO PATTIES 1/2 X 1/2

## (undated) DEVICE — PIN, 9733235, 100MM, STERILE, PERC REF

## (undated) DEVICE — SUT PROLENE 4-0 PS-2 18 IN 8682G

## (undated) DEVICE — SPECIMEN CONTAINER STERILE PEEL PACK

## (undated) DEVICE — MARKER REFLECTIVE RADIOPAQUE SPHERE

## (undated) DEVICE — STRETCH BANDAGE: Brand: CURITY

## (undated) DEVICE — PREP SURGICAL PURPREP 26ML

## (undated) DEVICE — ANTIBACTERIAL VIOLET BRAIDED (POLYGLACTIN 910), SYNTHETIC ABSORBABLE SUTURE: Brand: COATED VICRYL

## (undated) DEVICE — THE FLOSEAL MALLEABLE TIP AND TRIMMABLE TIP ARE INTENDED FOR DELIVERY OF FLOSEAL HEMOSTATIC MATRIX.: Brand: FLOSEAL SPECIAL APPLICATOR TIPS

## (undated) DEVICE — ELECTRODE BLADE MOD  E-Z CLEAN 6.5IN -0014M

## (undated) DEVICE — OCCLUSIVE GAUZE STRIP,3% BISMUTH TRIBROMOPHENATE IN PETROLATUM BLEND: Brand: XEROFORM

## (undated) DEVICE — GLOVE SRG BIOGEL ECLIPSE 8

## (undated) DEVICE — ACE WRAP 4 IN UNSTERILE

## (undated) DEVICE — NEEDLE 25G X 1 1/2

## (undated) DEVICE — SILVER-COATED ANTIMICROBIAL BARRIER DRESSING: Brand: ACTICOAT   4" X 8"

## (undated) DEVICE — SURGIFOAM 8.5 X 12.5

## (undated) DEVICE — JACKSON TABLE FOAM POSITIONING KIT: Brand: CARDINAL HEALTH

## (undated) DEVICE — GLOVE INDICATOR PI UNDERGLOVE SZ 8 BLUE

## (undated) DEVICE — DRAPE LAPAROTOMY W/POUCHES